# Patient Record
Sex: FEMALE | Race: ASIAN | NOT HISPANIC OR LATINO | Employment: FULL TIME | URBAN - METROPOLITAN AREA
[De-identification: names, ages, dates, MRNs, and addresses within clinical notes are randomized per-mention and may not be internally consistent; named-entity substitution may affect disease eponyms.]

---

## 2017-06-27 ENCOUNTER — TRANSCRIBE ORDERS (OUTPATIENT)
Dept: ADMINISTRATIVE | Facility: HOSPITAL | Age: 37
End: 2017-06-27

## 2017-06-27 ENCOUNTER — HOSPITAL ENCOUNTER (OUTPATIENT)
Dept: RADIOLOGY | Facility: HOSPITAL | Age: 37
Discharge: HOME | End: 2017-06-27

## 2017-06-27 DIAGNOSIS — R76.11 POSITIVE TB TEST: Primary | ICD-10-CM

## 2017-06-27 DIAGNOSIS — R76.11 POSITIVE TB TEST: ICD-10-CM

## 2017-06-27 PROCEDURE — 71010 HB CHEST X-RAY 1 VIEW FRONTAL: CPT

## 2017-11-21 ENCOUNTER — APPOINTMENT (OUTPATIENT)
Dept: LAB | Facility: CLINIC | Age: 37
End: 2017-11-21
Payer: COMMERCIAL

## 2017-11-21 ENCOUNTER — TRANSCRIBE ORDERS (OUTPATIENT)
Dept: LAB | Facility: CLINIC | Age: 37
End: 2017-11-21

## 2017-11-21 DIAGNOSIS — K50.819 CROHN'S DISEASE OF SMALL AND LARGE INTESTINES WITH COMPLICATION (HCC): Primary | ICD-10-CM

## 2017-11-21 DIAGNOSIS — K50.819 CROHN'S DISEASE OF SMALL AND LARGE INTESTINES WITH COMPLICATION (HCC): ICD-10-CM

## 2017-11-21 DIAGNOSIS — K58.9 IRRITABLE BOWEL SYNDROME, UNSPECIFIED TYPE: ICD-10-CM

## 2017-11-21 LAB
ALBUMIN SERPL BCP-MCNC: 3.6 G/DL (ref 3.5–5)
ALP SERPL-CCNC: 68 U/L (ref 46–116)
ALT SERPL W P-5'-P-CCNC: 39 U/L (ref 12–78)
ANION GAP SERPL CALCULATED.3IONS-SCNC: 7 MMOL/L (ref 4–13)
AST SERPL W P-5'-P-CCNC: 17 U/L (ref 5–45)
BASOPHILS # BLD AUTO: 0.01 THOUSANDS/ΜL (ref 0–0.1)
BASOPHILS NFR BLD AUTO: 0 % (ref 0–1)
BILIRUB SERPL-MCNC: 0.5 MG/DL (ref 0.2–1)
BUN SERPL-MCNC: 10 MG/DL (ref 5–25)
CALCIUM SERPL-MCNC: 9 MG/DL (ref 8.3–10.1)
CHLORIDE SERPL-SCNC: 105 MMOL/L (ref 100–108)
CO2 SERPL-SCNC: 28 MMOL/L (ref 21–32)
CREAT SERPL-MCNC: 0.59 MG/DL (ref 0.6–1.3)
CRP SERPL QL: <3 MG/L
EOSINOPHIL # BLD AUTO: 0.11 THOUSAND/ΜL (ref 0–0.61)
EOSINOPHIL NFR BLD AUTO: 3 % (ref 0–6)
ERYTHROCYTE [DISTWIDTH] IN BLOOD BY AUTOMATED COUNT: 11.5 % (ref 11.6–15.1)
ERYTHROCYTE [SEDIMENTATION RATE] IN BLOOD: 20 MM/HOUR (ref 0–20)
FERRITIN SERPL-MCNC: 27 NG/ML (ref 8–388)
GFR SERPL CREATININE-BSD FRML MDRD: 117 ML/MIN/1.73SQ M
GLUCOSE SERPL-MCNC: 89 MG/DL (ref 65–140)
HCT VFR BLD AUTO: 29.3 % (ref 34.8–46.1)
HGB BLD-MCNC: 10 G/DL (ref 11.5–15.4)
IRON SERPL-MCNC: 76 UG/DL (ref 50–170)
LYMPHOCYTES # BLD AUTO: 1.03 THOUSANDS/ΜL (ref 0.6–4.47)
LYMPHOCYTES NFR BLD AUTO: 32 % (ref 14–44)
MCH RBC QN AUTO: 31.8 PG (ref 26.8–34.3)
MCHC RBC AUTO-ENTMCNC: 34.1 G/DL (ref 31.4–37.4)
MCV RBC AUTO: 93 FL (ref 82–98)
MONOCYTES # BLD AUTO: 0.2 THOUSAND/ΜL (ref 0.17–1.22)
MONOCYTES NFR BLD AUTO: 6 % (ref 4–12)
NEUTROPHILS # BLD AUTO: 1.84 THOUSANDS/ΜL (ref 1.85–7.62)
NEUTS SEG NFR BLD AUTO: 59 % (ref 43–75)
PLATELET # BLD AUTO: 223 THOUSANDS/UL (ref 149–390)
PMV BLD AUTO: 9.2 FL (ref 8.9–12.7)
POTASSIUM SERPL-SCNC: 4.2 MMOL/L (ref 3.5–5.3)
PROT SERPL-MCNC: 7.4 G/DL (ref 6.4–8.2)
RBC # BLD AUTO: 3.14 MILLION/UL (ref 3.81–5.12)
SODIUM SERPL-SCNC: 140 MMOL/L (ref 136–145)
TIBC SERPL-MCNC: 310 UG/DL (ref 250–450)
WBC # BLD AUTO: 3.19 THOUSAND/UL (ref 4.31–10.16)

## 2017-11-21 PROCEDURE — 85652 RBC SED RATE AUTOMATED: CPT

## 2017-11-21 PROCEDURE — 85025 COMPLETE CBC W/AUTO DIFF WBC: CPT

## 2017-11-21 PROCEDURE — 82542 COL CHROMOTOGRAPHY QUAL/QUAN: CPT

## 2017-11-21 PROCEDURE — 86140 C-REACTIVE PROTEIN: CPT

## 2017-11-21 PROCEDURE — 83540 ASSAY OF IRON: CPT

## 2017-11-21 PROCEDURE — 80053 COMPREHEN METABOLIC PANEL: CPT

## 2017-11-21 PROCEDURE — 36415 COLL VENOUS BLD VENIPUNCTURE: CPT

## 2017-11-21 PROCEDURE — 83550 IRON BINDING TEST: CPT

## 2017-11-21 PROCEDURE — 82728 ASSAY OF FERRITIN: CPT

## 2017-11-25 ENCOUNTER — OFFICE VISIT (OUTPATIENT)
Dept: URGENT CARE | Facility: MEDICAL CENTER | Age: 37
End: 2017-11-25
Payer: COMMERCIAL

## 2017-11-25 PROCEDURE — 99213 OFFICE O/P EST LOW 20 MIN: CPT

## 2017-11-25 PROCEDURE — S9088 SERVICES PROVIDED IN URGENT: HCPCS

## 2017-11-29 ENCOUNTER — OFFICE VISIT (OUTPATIENT)
Dept: URGENT CARE | Facility: MEDICAL CENTER | Age: 37
End: 2017-11-29
Payer: COMMERCIAL

## 2017-11-29 PROCEDURE — S9088 SERVICES PROVIDED IN URGENT: HCPCS

## 2017-11-29 PROCEDURE — 99203 OFFICE O/P NEW LOW 30 MIN: CPT

## 2017-11-29 NOTE — PROGRESS NOTES
Assessment    1  Otitis externa (380 10) (J72 76)    Plan  Otitis externa    · Ofloxacin 0 3 % Otic Solution; INSTILL 5 DROPS INTO RIGHT EAR TWICE DAILY   · *1 -  [facility] Co-Management  *  Status: Hold For -Scheduling,Retrospective Authorization  Requested for: [id number]  Care Summary provided  : Yes    Discussion/Summary  Discussion Summary:   Use antibiotic drops as directed  Follow with ENT  Medication Side Effects Reviewed: Possible side effects of new medications were reviewed with the patient/guardian today  Understands and agrees with treatment plan: The treatment plan was reviewed with the patient/guardian  The patient/guardian understands and agrees with the treatment plan   Counseling Documentation With Imm: The patient was counseled regarding diagnostic results,-- instructions for management,-- risk factor reductions,-- prognosis,-- patient and family education,-- impressions,-- risks and benefits of treatment options,-- importance of compliance with treatment  Follow Up Instructions: Follow Up with your Primary Care Provider in 1-2 days  If your symptoms worsen, go to the nearest [place] [address] Emergency Department  Chief Complaint    1  Ear Discharge  Chief Complaint Free Text Note Form: Pt has chronic ear drainage that became bloody yesteday and she developed mild discomfort in same      History of Present Illness  Hospital Based Practices Required Assessment:  Pain Assessment  the patient states they have pain  The pain is located in the R ear  (on a scale of 0 to 10, the patient rates the pain at 2 )  Abuse And Domestic Violence Screen   Yes, the patient is safe at home  -- The patient states no one is hurting them  Depression And Suicide Screen  No, the patient has not had thoughts of hurting themself  No, the patient has not felt depressed in the past 7 days  Prefered Language is  english  Primary Language is  luis armando  Readiness To Learn: Receptive    Barriers To Learning: none  Preferred Learning: verbal   Otitis Externa (Brief): The patient is being seen for an initial evaluation of otitis externa  Symptoms:  ear pain,-- ear drainage,-- ear fullness-- and-- ear itching, but-- no ear swelling-- and-- no difficulty hearing  The patient is currently experiencing symptoms  Associated symptoms:  Patient reports history of ear drainage  Patient reports she has been to multiple ENTs in the past , but-- no fever,-- no stuffy nose,-- no sore throat,-- no headache,-- no dizziness,-- no cervical lymphadenopathy,-- no tooth pain,-- no temporomandibular joint pain-- and-- no tinnitus  Review of Systems  Focused-Female:  Constitutional: No fever, no chills, feels well, no tiredness, no recent weight gain or loss  ENT: no ear ache, no loss of hearing, no nosebleeds or nasal discharge, no sore throat or hoarseness-- and-- as noted in HPI  Cardiovascular: no complaints of slow or fast heart rate, no chest pain, no palpitations, no leg claudication or lower extremity edema  Respiratory: no complaints of shortness of breath, no wheezing, no dyspnea on exertion, no orthopnea or PND  Breasts: no complaints of breast pain, breast lump or nipple discharge  Gastrointestinal: no complaints of abdominal pain, no constipation, no nausea or diarrhea, no vomiting, no bloody stools  Genitourinary: no complaints of dysuria, no incontinence, no pelvic pain, no dysmenorrhea, no vaginal discharge or abnormal vaginal bleeding  Musculoskeletal: no complaints of arthralgia, no myalgia, no joint swelling or stiffness, no limb pain or swelling  Integumentary: no complaints of skin rash or lesion, no itching or dry skin, no skin wounds  Neurological: no complaints of headache, no confusion, no numbness or tingling, no dizziness or fainting  Active Problems  1  Crohn's disease (555 9) (K50 90)   2  Ear drainage (388 60) (H92 10)    Past Medical History  1   Patient denies significant medical history  Active Problems And Past Medical History Reviewed: The active problems and past medical history were reviewed and updated today  Family History  Family History Reviewed: The family history was reviewed and updated today  Social History   · Never a smoker  Social History Reviewed: The social history was reviewed and updated today  Surgical History    1  History of  Section  Surgical History Reviewed: The surgical history was reviewed and updated today  Current Meds   1  AzaTHIOprine 50 MG Oral Tablet; Therapy: (Recorded:2017) to Recorded  Medication List Reviewed: The medication list was reviewed and updated today  Allergies    1  No Known Drug Allergies    Vitals  Signs   Recorded: 2017 11:57AM   Temperature: 98 7 F  Heart Rate: 96  Respiration: 18  Systolic: 626  Diastolic: 62  Weight: 769 lb   O2 Saturation: 97  Pain Scale: 2    Physical Exam   Constitutional  General appearance: No acute distress, well appearing and well nourished  Eyes  Conjunctiva and lids: No swelling, erythema or discharge  Pupils and irises: Equal, round and reactive to light  Ears, Nose, Mouth, and Throat  External inspection of ears and nose: Normal    Otoscopic examination: Abnormal   The right external canal was tender,-- was swollen,-- was erythematous-- and-- had a discharge, but-- was normal,-- did not have a foreign body-- and-- did not have exostosis  Pulmonary  Respiratory effort: No increased work of breathing or signs of respiratory distress  Auscultation of lungs: Clear to auscultation  Cardiovascular  Palpation of heart: Normal PMI, no thrills  Auscultation of heart: Normal rate and rhythm, normal S1 and S2, without murmurs     Examination of extremities for edema and/or varicosities: Normal    Psychiatric  Orientation to person, place, and time: Normal    Mood and affect: Normal        Signatures   Electronically signed by : LUANNE Gerber;  2017 12:26PM EST                       (Author)    Electronically signed by : NOA Tobias ; Nov 28 2017  4:42PM EST                       (Co-author)

## 2017-11-30 LAB
6-TGN ENTSUB RBC: 106 PMOL/8X 10E8
6MMP ENTSUB RBC: 253 PMOL/8X 10E8

## 2017-12-07 ENCOUNTER — TRANSCRIBE ORDERS (OUTPATIENT)
Dept: LAB | Facility: CLINIC | Age: 37
End: 2017-12-07

## 2017-12-07 ENCOUNTER — APPOINTMENT (OUTPATIENT)
Dept: LAB | Facility: CLINIC | Age: 37
End: 2017-12-07
Payer: COMMERCIAL

## 2017-12-07 DIAGNOSIS — K58.9 IRRITABLE BOWEL SYNDROME, UNSPECIFIED TYPE: ICD-10-CM

## 2017-12-07 DIAGNOSIS — K50.819 CROHN'S DISEASE OF SMALL AND LARGE INTESTINES WITH COMPLICATION (HCC): Primary | ICD-10-CM

## 2017-12-07 DIAGNOSIS — K50.819 CROHN'S DISEASE OF SMALL AND LARGE INTESTINES WITH COMPLICATION (HCC): ICD-10-CM

## 2017-12-07 PROCEDURE — 83993 ASSAY FOR CALPROTECTIN FECAL: CPT

## 2017-12-13 LAB — CALPROTECTIN STL-MCNT: <16 UG/G (ref 0–120)

## 2017-12-14 ENCOUNTER — ALLSCRIPTS OFFICE VISIT (OUTPATIENT)
Dept: OTHER | Facility: OTHER | Age: 37
End: 2017-12-14

## 2018-01-04 ENCOUNTER — GENERIC CONVERSION - ENCOUNTER (OUTPATIENT)
Dept: OTHER | Facility: OTHER | Age: 38
End: 2018-01-04

## 2018-01-09 NOTE — PROGRESS NOTES
Assessment   1  History of Yeast infection (112 9) (B37 9)    Plan    Fluconazole 150 MG Oral Tablet; TAKE 1 TABLET 1 TIME ONLY; Therapy: [id number] to (Tammy Rabago)  Requested for: [id number]; Last Rx:29Nov2017; Status: ACTIVE Ordered     Rx By: Vivian Mina; Dispense: 1 Days ; #:1 Tablet; Refill: 0;      For: PMH: Yeast infection; JANE = N; Verified Transmission to [address] [place] [address]; Last Updated By: System, [employer]; 12/14/2017 8:59:09 AM      Discussion/Summary   Discussion Summary:    Take fluconazole as directed  follow up with PCP year old URI next fell appointment  Go to the ER for worsening symptoms uncontrolled pain fever chills discharge or other concerning symptoms  Medication Side Effects Reviewed: Possible side effects of new medications were reviewed with the patient/guardian today  Understands and agrees with treatment plan: The treatment plan was reviewed with the patient/guardian  The patient/guardian understands and agrees with the treatment plan    Counseling Documentation With Imm: The patient was counseled regarding diagnostic results,-- instructions for management,-- risk factor reductions,-- prognosis,-- patient and family education,-- impressions,-- risks and benefits of treatment options,-- importance of compliance with treatment  Follow Up Instructions: Follow Up with your Primary Care Provider in 1-2 days  If your symptoms worsen, go to the nearest [place] [address] Emergency Department  Chief Complaint   1  Dysuria  Chief Complaint Free Text Note Form: Pt states she has pain on urination since saturday      History of Present Illness   HPI: This is a 66-year-old female complaining of burning itching and vaginal discharge  Patient reports she is only sexually active with her   Patient reports discharge his cottage cheeselike   Patient reports she has had yeast infections in the past and feels same    Hospital Based Practices Required Assessment: Pain Assessment      the patient states they have pain  The pain is located in the urination  Review of Systems   Focused-Female:      Constitutional: No fever, no chills, feels well, no tiredness, no recent weight gain or loss  ENT: no ear ache, no loss of hearing, no nosebleeds or nasal discharge, no sore throat or hoarseness  Cardiovascular: no complaints of slow or fast heart rate, no chest pain, no palpitations, no leg claudication or lower extremity edema  Respiratory: no complaints of shortness of breath, no wheezing, no dyspnea on exertion, no orthopnea or PND  Breasts: no complaints of breast pain, breast lump or nipple discharge  Gastrointestinal: no complaints of abdominal pain, no constipation, no nausea or diarrhea, no vomiting, no bloody stools  Genitourinary: no complaints of dysuria, no incontinence, no pelvic pain, no dysmenorrhea, no vaginal discharge or abnormal vaginal bleeding-- and-- as noted in HPI  Musculoskeletal: no complaints of arthralgia, no myalgia, no joint swelling or stiffness, no limb pain or swelling  Integumentary: no complaints of skin rash or lesion, no itching or dry skin, no skin wounds  Neurological: no complaints of headache, no confusion, no numbness or tingling, no dizziness or fainting  Active Problems    1  Ear drainage (388 60) (H92 10)      Crohn's disease (555 9) (K50 90)               Past Medical History   1  History of Otitis externa (380 10) (H60 90)   2  Patient denies significant medical history  Active Problems And Past Medical History Reviewed: The active problems and past medical history were reviewed and updated today  Family History   Family History Reviewed: The family history was reviewed and updated today  Social History    · Never a smoker  Social History Reviewed: The social history was reviewed and updated today  Surgical History   1   History of  Section  Surgical History Reviewed: The surgical history was reviewed and updated today  Current Meds    1  AzaTHIOprine 50 MG Oral Tablet; Therapy: (Recorded:25Nov2017) to Recorded   2  Ofloxacin 0 3 % Otic Solution; INSTILL 5 DROPS INTO RIGHT EAR TWICE DAILY; Therapy: [id number] to (Evaluate:64Ler6866); Last Rx:25Nov2017 Ordered  Medication List Reviewed: The medication list was reviewed and updated today  Allergies   1  No Known Drug Allergies    Vitals   Signs   Recorded: 36GER5995 08:24PM   Temperature: 98 6 F  Heart Rate: 100  Respiration: 20  Weight: 114 lb   O2 Saturation: 97    Physical Exam        Constitutional      General appearance: No acute distress, well appearing and well nourished  Pulmonary      Respiratory effort: No increased work of breathing or signs of respiratory distress  Auscultation of lungs: Clear to auscultation  Cardiovascular      Auscultation of heart: Normal rate and rhythm, normal S1 and S2, without murmurs  Lymphatic      Palpation of lymph nodes in neck: No lymphadenopathy         Future Appointments      Date/Time Provider Specialty Site   01/18/2018 08:30 AM Imelda Parker, Alice De La Fuente  Family Medicine [facility]     Signatures    Electronically signed by : Juan David Plasencia [facility]; Jan 8 2018  8:23AM EST                       (Author)     Electronically signed by : NOA Lester ; Jan 8 2018  3:59PM EST                       (Co-author)

## 2018-01-18 ENCOUNTER — GENERIC CONVERSION - ENCOUNTER (OUTPATIENT)
Dept: OTHER | Facility: OTHER | Age: 38
End: 2018-01-18

## 2018-01-18 DIAGNOSIS — Z12.4 ENCOUNTER FOR SCREENING FOR MALIGNANT NEOPLASM OF CERVIX: ICD-10-CM

## 2018-01-18 PROCEDURE — G0145 SCR C/V CYTO,THINLAYER,RESCR: HCPCS | Performed by: NURSE PRACTITIONER

## 2018-01-18 PROCEDURE — 87624 HPV HI-RISK TYP POOLED RSLT: CPT | Performed by: NURSE PRACTITIONER

## 2018-01-19 ENCOUNTER — LAB REQUISITION (OUTPATIENT)
Dept: LAB | Facility: HOSPITAL | Age: 38
End: 2018-01-19
Payer: COMMERCIAL

## 2018-01-19 DIAGNOSIS — Z12.4 ENCOUNTER FOR SCREENING FOR MALIGNANT NEOPLASM OF CERVIX: ICD-10-CM

## 2018-01-23 VITALS
RESPIRATION RATE: 16 BRPM | WEIGHT: 116 LBS | HEART RATE: 98 BPM | DIASTOLIC BLOOD PRESSURE: 70 MMHG | SYSTOLIC BLOOD PRESSURE: 106 MMHG | BODY MASS INDEX: 23.39 KG/M2 | HEIGHT: 59 IN | TEMPERATURE: 98.1 F

## 2018-01-23 LAB — HPV RRNA GENITAL QL NAA+PROBE: NORMAL

## 2018-01-23 NOTE — PROGRESS NOTES
Assessment    1  Encounter for preventive health examination (V70 0) (Z00 00)   2  Crohn's disease (555 9) (K50 90)   3  Hearing loss (389 9) (H91 90)   · right    Plan  Crohn's disease    · 1 - Birgit DINH, Cara Montalvo (Gastroenterology) Co-Management  *  Status: Active  Requested  for: 40LRD2900  Care Summary provided  : Yes   · 2 - Kandice Ellison MD, Mike Roberts  Colorectal Surgery, Gastroenterology Co-Management  *   Status: Hold For - Scheduling  Requested for: 13RJM7986  Care Summary provided  : Yes    Discussion/Summary  health maintenance visit Currently, she eats a healthy diet and has an adequate exercise regimen  the risks and benefits of cervical cancer screening were discussed schedule pap Breast cancer screening: the risks and benefits of breast cancer screening were discussed and breast cancer screening is not indicated  Colorectal cancer screening: has Crohns and will be getting colonoscopy 12/2017  Osteoporosis screening: bone mineral density testing is not indicated  The will get vaccine info, signed release of info  She was advised to be evaluated by ENT and GI  Advice and education were given regarding nutrition, aerobic exercise and weight bearing exercise  Patient discussion: discussed with the patient  HM- get prior records, schedule GYN/Womens health appt  Crohns- follow up with GI in [facility] and find local provider- suggestions given  Hearing loss- follow up ENT  The patient was counseled regarding instructions for management, risk factor reductions, impressions  Chief Complaint  new patient, establish care, physical      History of Present Illness  , Adult Female: The patient is being seen for a health maintenance evaluation  The last health maintenance visit was >1 year(s) ago  General Health: The patient's health since the last visit is described as good  She has regular dental visits  She denies vision problems  She has hearing loss  right side, followed by ENT   Immunizations status:  by report up to date  Lifestyle:  She consumes a diverse and healthy diet  She does not have any weight concerns  She exercises regularly  She does not use tobacco  She consumes alcohol  She denies drug use  Reproductive health:  she reports normal menses  she uses contraception  For contraception, she essure permanent  she is sexually active  pregnancy history: G 2P 2, 2   c section x2  Screening:   HPI: Crohns Dz diagnosed about 1 year ago, on medication, has colonoscopy scheduled for this month  Followed by GI in [place]  Hearing loss- right ear, was evaluated more than a year ago, and now seeing ENT in [place] for hearing loss  Has follow up appointment  Review of Systems    Constitutional: no fever, not feeling poorly and not feeling tired  Eyes: no eye pain, no eyesight problems and no purulent discharge from the eyes  ENT: hearing loss, but as noted in HPI  Cardiovascular: no chest pain, no palpitations and no lower extremity edema  Respiratory: no shortness of breath, no cough, no wheezing and no shortness of breath during exertion  Gastrointestinal: +crohns, but as noted in HPI, no abdominal pain, no nausea, no constipation and no diarrhea  Genitourinary: some heavy periods, last pap 7 years ago, but no pelvic pain  Musculoskeletal: no arthralgias and no myalgias  Integumentary: no rashes  Psychiatric: no anxiety and no depression  Active Problems    1  Crohn's disease (555 9) (K50 90)   2  Dysuria (788 1) (R30 0)   3  Ear drainage (388 60) (H92 10)   4   Hearing loss (389 9) (H91 90)    Past Medical History    · History of Otitis externa (380 10) (H60 90)   · Patient denies significant medical history   · History of Yeast infection (112 9) (B37 9)    Surgical History    · History of  Section    Family History  Mother    · Family history of hypertension (V17 49) (Z82 49)  Father    · Family history of hypertension (V17 49) (Z82 49)    Social History    · Always uses seat belt   · Consumes healthy diet (V49 89)   · Employed   · Exercises regularly   ·    · Never a smoker   · No illicit drug use   · Occasional alcohol use    Current Meds   1  AzaTHIOprine 50 MG Oral Tablet; TAKE 1 on day one  AND 1 and 1/2 TABLETSday two,   alternating; Therapy: (Recorded:79Bht6569) to Recorded   2  Ofloxacin 0 3 % Otic Solution; INSTILL 5 DROPS INTO RIGHT EAR TWICE DAILY; Therapy: 47BFZ2182 to (Evaluate:67Oyl0187); Last Rx:25Nov2017 Ordered    completed ear drops     Allergies    1  No Known Drug Allergies    Vitals   Recorded: 62VHV5921 08:37AM   Temperature 98 1 F   Heart Rate 98   Respiration 16   Systolic 150   Diastolic 70   Height 4 ft 10 7 in   Weight 116 lb    BMI Calculated 23 67   BSA Calculated 1 46   Pain Scale 0     Physical Exam    Constitutional   General appearance: No acute distress, well appearing and well nourished  Head and Face   Head and face: Normal     Palpation of the face and sinuses: No sinus tenderness  Eyes   Conjunctiva and lids: No swelling, erythema or discharge  Pupils and irises: Equal, round, reactive to light  Ears, Nose, Mouth, and Throat   External inspection of ears and nose: Normal     Otoscopic examination: Abnormal   right ear TM distorted, injected  Hearing: Normal     Nasal mucosa, septum, and turbinates: Normal without edema or erythema  Lips, teeth, and gums: Normal, good dentition  Oropharynx: Normal with no erythema, edema, exudate or lesions  Neck   Neck: Supple, symmetric, trachea midline, no masses  Thyroid: Normal, no thyromegaly  Pulmonary   Respiratory effort: No increased work of breathing or signs of respiratory distress  Auscultation of lungs: Clear to auscultation  Cardiovascular   Palpation of heart: Normal PMI, no thrills  Auscultation of heart: Normal rate and rhythm, normal S1 and S2, no murmurs  Peripheral vascular exam: Normal     Abdomen   Abdomen: Non-tender, no masses  Lymphatic   Palpation of lymph nodes in neck: No lymphadenopathy  Musculoskeletal   Gait and station: Normal     Digits and nails: Normal without clubbing or cyanosis  Joints, bones, and muscles: Normal     Range of motion: Normal     Stability: Normal     Muscle strength/tone: Normal     Skin   Skin and subcutaneous tissue: Normal without rashes or lesions  Neurologic   Cortical function: Normal mental status  Reflexes: 2+ and symmetric  Sensation: No sensory loss  Psychiatric   Orientation to person, place, and time: Normal     Recent and remote memory: Intact  Attending Note  Collaborating Physician Note: Collaborating Physician: I discussed the case with the Advanced Practitioner and reviewed the note, I supervised the Advanced Practitioner and I agree with the Advanced Practitioner note        Future Appointments    Date/Time Provider Specialty Site   01/04/2018 01:00 PM Iban Brooks, [address] FAMILY PRACTICE     Signatures   Electronically signed by : Destiny Mariee, [facility]; Dec 14 2017  3:17PM EST                       (Author)    Electronically signed by : JAYLIN Salcedo ; Dec 15 2017  1:00PM EST                       (Author)

## 2018-01-23 NOTE — MISCELLANEOUS
Provider Comments  Provider Comments:   pt no showed today      Signatures   Electronically signed by : Kyle Blackwell, ; Jan 4 2018  1:38PM EST                       (Author)

## 2018-01-24 VITALS
HEIGHT: 59 IN | HEART RATE: 72 BPM | BODY MASS INDEX: 23.08 KG/M2 | SYSTOLIC BLOOD PRESSURE: 100 MMHG | TEMPERATURE: 99.1 F | RESPIRATION RATE: 16 BRPM | DIASTOLIC BLOOD PRESSURE: 60 MMHG | WEIGHT: 114.5 LBS

## 2018-01-24 LAB
LAB AP GYN PRIMARY INTERPRETATION: NORMAL
Lab: NORMAL

## 2018-04-25 ENCOUNTER — OFFICE VISIT (OUTPATIENT)
Dept: GASTROENTEROLOGY | Facility: CLINIC | Age: 38
End: 2018-04-25
Payer: COMMERCIAL

## 2018-04-25 VITALS
TEMPERATURE: 98.5 F | DIASTOLIC BLOOD PRESSURE: 70 MMHG | HEART RATE: 75 BPM | BODY MASS INDEX: 22.98 KG/M2 | HEIGHT: 59 IN | WEIGHT: 114 LBS | SYSTOLIC BLOOD PRESSURE: 102 MMHG

## 2018-04-25 DIAGNOSIS — K50.80 CROHN'S DISEASE OF BOTH SMALL AND LARGE INTESTINE WITHOUT COMPLICATION (HCC): Primary | ICD-10-CM

## 2018-04-25 DIAGNOSIS — K64.8 INTERNAL HEMORRHOIDS WITH COMPLICATION: ICD-10-CM

## 2018-04-25 PROCEDURE — 99244 OFF/OP CNSLTJ NEW/EST MOD 40: CPT | Performed by: INTERNAL MEDICINE

## 2018-04-25 RX ORDER — AZATHIOPRINE 50 MG/1
TABLET ORAL
COMMUNITY
End: 2019-02-18 | Stop reason: SDUPTHER

## 2018-04-25 NOTE — LETTER
April 25, 2018     Merlinda Nail, [address] [place] [address]    Patient: Josh Gusman   YOB: 1980   Date of Visit: 4/25/2018       Dear Dr Pawel Cristina: Thank you for referring Josh Gusman to me for evaluation  Below are my notes for this consultation  If you have questions, please do not hesitate to call me  I look forward to following your patient along with you  Sincerely,        Elvis Mcadams MD        CC: No Recipients  Elvis Mcadams MD  4/25/2018  5:30 PM  Sign at close encounter  Consultation - [facility]  Josh Gusman 40 y o  female MRN: [id number]          Assessment & Plan:  Pleasant 77-year-old female diagnosed with Crohn's disease of her small bowel and colon, clinically in remission on azathioprine alternating doses a 50 and 75 mg, last colonoscopy December 2017 demonstrated complete mucosal healing at outside institution  Patient also has intermittent rectal bleeding secondary to internal and external hemorrhoids  1  Crohn's disease of both small and large intestine without complication   -continue current doses of azathioprine  -we will check laboratory studies as per routine  -discussed patient long-term side effects of Imuran, recommended annual dermatologic examination    2  Rectal bleeding secondary to internal and external hemorrhoids  - I recommend taking Miralax daily for the hemorrhoids  She was instructed to lower the dose if she notices loose stools  - I suggest trying sitzbath daily to help external hemorrhoids   - She will continue using hydrocortisone acetate suppositories as needed for her internal hemorrhoids  _____________________________________________________________    CC: Crohn's disease  HPI:  Josh Gusman is a 40 y  o female who was referred here after she moved to [place] and was too far away from her previous gastroenterologist  She was diagnosed with Crohn's disease in colon and ileum by Dr Wilde Laughter  Her symptoms began in 2015  The symptoms included constipation, and abdominal pain  She having denies diarrhea  She was put on steroids for 2 months after her diagnosis, and she has been taking Imuran since then  She takes fiber supplements for external and internal hemorrhoids  She is otherwise very healthy and denies change in stool caliber, melena, hematochezia, tenesmus, loss of appetite, nausea, weight loss, vomiting, diarrhea, and dysphagia  She reports sometimes experiencing chills  She is a nonsmoker, and drinks socially  She had two kids, and has no family history of Crohn's disease, but the patient's cousin was diagnosed with ulcerative colitis  She denies NSAID use  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[place] [address]  He continues to have intermittent rectal bleeding which has harder stools  She also notes that the higher doses of Imuran she did have leukopenia  Her last colonoscopy December demonstrated complete mucosal healing  ROS:  The remainder of the ROS was negative except for the pertinent positives mentioned in HPI  Allergies: Patient has no known allergies  Medications:   Current Outpatient Prescriptions:     azaTHIOprine (IMURAN) 50 mg tablet, Take by mouth, Disp: , Rfl: '    History reviewed  No pertinent past medical history  Past Surgical History:   Procedure Laterality Date    COLONOSCOPY         Family History   Problem Relation Age of Onset    Colon cancer Neg Hx     Liver disease Neg Hx         reports that she has never smoked  She has never used smokeless tobacco  She reports that she drinks alcohol  She reports that she does not use drugs            Physical Exam:     /70 (BP Location: Left arm, Patient Position: Sitting, Cuff Size: Standard)   Pulse 75   Temp 98 5 °F (36 9 °C)   Ht 4' 11" (1 499 m)   Wt 51 7 kg (114 lb)   BMI 23 03 kg/m²      Gen: wn/wd, NAD  HEENT: anicteric, MMM, no cervical LAD  CVS: RRR, no m/r/g  CHEST: CTA b/l  ABD: +BS, soft, NT,ND, no hepatosplenomegaly  EXT: no c/c/e  NEURO: aaox3  SKIN: NO rashes    Attestation:   By signing my name below, IAlen, attest that this documentation has been prepared under the direction and in the presence of Pat Mccabe MD  Electronically Signed: Pablo Pham  04/25/2018  IPat, personally performed the services described in this documentation  All medical record entries made by the scribe were at my direction and in my presence  I have reviewed the chart and discharge instructions and agree that the record reflects my personal performance and is accurate and complete  Pat Mccabe MD  04/25/2018

## 2018-04-25 NOTE — PROGRESS NOTES
Consultation - [facility]  Josh Gusman 40 y o  female MRN: [id number]          Assessment & Plan:  Pleasant 35-year-old female diagnosed with Crohn's disease of her small bowel and colon, clinically in remission on azathioprine alternating doses a 50 and 75 mg, last colonoscopy December 2017 demonstrated complete mucosal healing at outside institution  Patient also has intermittent rectal bleeding secondary to internal and external hemorrhoids  1  Crohn's disease of both small and large intestine without complication   -continue current doses of azathioprine  -we will check laboratory studies as per routine  -discussed patient long-term side effects of Imuran, recommended annual dermatologic examination    2  Rectal bleeding secondary to internal and external hemorrhoids  - I recommend taking Miralax daily for the hemorrhoids  She was instructed to lower the dose if she notices loose stools  - I suggest trying sitzbath daily to help external hemorrhoids   - She will continue using hydrocortisone acetate suppositories as needed for her internal hemorrhoids  _____________________________________________________________    CC: Crohn's disease  HPI:  Josh Gusman is a 40 y  o female who was referred here after she moved to [place] and was too far away from her previous gastroenterologist  She was diagnosed with Crohn's disease in colon and ileum by Dr Fauzia De Dios  Her symptoms began in 2015  The symptoms included constipation, and abdominal pain  She having denies diarrhea  She was put on steroids for 2 months after her diagnosis, and she has been taking Imuran since then  She takes fiber supplements for external and internal hemorrhoids  She is otherwise very healthy and denies change in stool caliber, melena, hematochezia, tenesmus, loss of appetite, nausea, weight loss, vomiting, diarrhea, and dysphagia  She reports sometimes experiencing chills   She is a nonsmoker, and drinks socially  She had two kids, and has no family history of Crohn's disease, but the patient's cousin was diagnosed with ulcerative colitis  She denies NSAID use  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[place] [address]  He continues to have intermittent rectal bleeding which has harder stools  She also notes that the higher doses of Imuran she did have leukopenia  Her last colonoscopy December demonstrated complete mucosal healing  ROS:  The remainder of the ROS was negative except for the pertinent positives mentioned in HPI  Allergies: Patient has no known allergies  Medications:   Current Outpatient Prescriptions:     azaTHIOprine (IMURAN) 50 mg tablet, Take by mouth, Disp: , Rfl: '    History reviewed  No pertinent past medical history  Past Surgical History:   Procedure Laterality Date    COLONOSCOPY         Family History   Problem Relation Age of Onset    Colon cancer Neg Hx     Liver disease Neg Hx         reports that she has never smoked  She has never used smokeless tobacco  She reports that she drinks alcohol  She reports that she does not use drugs  Physical Exam:     /70 (BP Location: Left arm, Patient Position: Sitting, Cuff Size: Standard)   Pulse 75   Temp 98 5 °F (36 9 °C)   Ht 4' 11" (1 499 m)   Wt 51 7 kg (114 lb)   BMI 23 03 kg/m²     Gen: wn/wd, NAD  HEENT: anicteric, MMM, no cervical LAD  CVS: RRR, no m/r/g  CHEST: CTA b/l  ABD: +BS, soft, NT,ND, no hepatosplenomegaly  EXT: no c/c/e  NEURO: aaox3  SKIN: NO rashes    Attestation:   By signing my name below, INati, attest that this documentation has been prepared under the direction and in the presence of Amairlis Haskins MD  Electronically Signed: Christal Garcia  04/25/2018  IAmarilis, personally performed the services described in this documentation  All medical record entries made by the christal were at my direction and in my presence   I have reviewed the chart and discharge instructions and agree that the record reflects my personal performance and is accurate and complete  Cora Snow MD  04/25/2018

## 2018-04-25 NOTE — PATIENT INSTRUCTIONS
Take miralax 1 capful daily, if you have diarrhea reduce to every other day  Sitz bath with 1/4 cup of epsom salt and warm water

## 2018-04-26 ENCOUNTER — APPOINTMENT (OUTPATIENT)
Dept: LAB | Facility: CLINIC | Age: 38
End: 2018-04-26
Payer: COMMERCIAL

## 2018-04-26 DIAGNOSIS — K50.80 CROHN'S DISEASE OF BOTH SMALL AND LARGE INTESTINE WITHOUT COMPLICATION (HCC): ICD-10-CM

## 2018-04-26 LAB
ALBUMIN SERPL BCP-MCNC: 3.8 G/DL (ref 3.5–5)
ALP SERPL-CCNC: 71 U/L (ref 46–116)
ALT SERPL W P-5'-P-CCNC: 32 U/L (ref 12–78)
ANION GAP SERPL CALCULATED.3IONS-SCNC: 6 MMOL/L (ref 4–13)
AST SERPL W P-5'-P-CCNC: 20 U/L (ref 5–45)
BASOPHILS # BLD AUTO: 0.01 THOUSANDS/ΜL (ref 0–0.1)
BASOPHILS NFR BLD AUTO: 0 % (ref 0–1)
BILIRUB DIRECT SERPL-MCNC: 0.14 MG/DL (ref 0–0.2)
BILIRUB SERPL-MCNC: 0.5 MG/DL (ref 0.2–1)
BUN SERPL-MCNC: 9 MG/DL (ref 5–25)
CALCIUM SERPL-MCNC: 8.7 MG/DL (ref 8.3–10.1)
CHLORIDE SERPL-SCNC: 104 MMOL/L (ref 100–108)
CO2 SERPL-SCNC: 28 MMOL/L (ref 21–32)
CREAT SERPL-MCNC: 0.53 MG/DL (ref 0.6–1.3)
CRP SERPL QL: <3 MG/L
EOSINOPHIL # BLD AUTO: 0.07 THOUSAND/ΜL (ref 0–0.61)
EOSINOPHIL NFR BLD AUTO: 2 % (ref 0–6)
ERYTHROCYTE [DISTWIDTH] IN BLOOD BY AUTOMATED COUNT: 11.5 % (ref 11.6–15.1)
ERYTHROCYTE [SEDIMENTATION RATE] IN BLOOD: 13 MM/HOUR (ref 0–20)
GFR SERPL CREATININE-BSD FRML MDRD: 122 ML/MIN/1.73SQ M
GLUCOSE SERPL-MCNC: 91 MG/DL (ref 65–140)
HCT VFR BLD AUTO: 30.2 % (ref 34.8–46.1)
HGB BLD-MCNC: 10.3 G/DL (ref 11.5–15.4)
LIPASE SERPL-CCNC: 228 U/L (ref 73–393)
LYMPHOCYTES # BLD AUTO: 1.38 THOUSANDS/ΜL (ref 0.6–4.47)
LYMPHOCYTES NFR BLD AUTO: 36 % (ref 14–44)
MCH RBC QN AUTO: 31.7 PG (ref 26.8–34.3)
MCHC RBC AUTO-ENTMCNC: 34.1 G/DL (ref 31.4–37.4)
MCV RBC AUTO: 93 FL (ref 82–98)
MONOCYTES # BLD AUTO: 0.26 THOUSAND/ΜL (ref 0.17–1.22)
MONOCYTES NFR BLD AUTO: 7 % (ref 4–12)
NEUTROPHILS # BLD AUTO: 2.08 THOUSANDS/ΜL (ref 1.85–7.62)
NEUTS SEG NFR BLD AUTO: 55 % (ref 43–75)
PLATELET # BLD AUTO: 274 THOUSANDS/UL (ref 149–390)
PMV BLD AUTO: 8.8 FL (ref 8.9–12.7)
POTASSIUM SERPL-SCNC: 4 MMOL/L (ref 3.5–5.3)
PROT SERPL-MCNC: 7.4 G/DL (ref 6.4–8.2)
RBC # BLD AUTO: 3.25 MILLION/UL (ref 3.81–5.12)
SODIUM SERPL-SCNC: 138 MMOL/L (ref 136–145)
WBC # BLD AUTO: 3.8 THOUSAND/UL (ref 4.31–10.16)

## 2018-04-26 PROCEDURE — 85025 COMPLETE CBC W/AUTO DIFF WBC: CPT

## 2018-04-26 PROCEDURE — 85652 RBC SED RATE AUTOMATED: CPT

## 2018-04-26 PROCEDURE — 86140 C-REACTIVE PROTEIN: CPT

## 2018-04-26 PROCEDURE — 36415 COLL VENOUS BLD VENIPUNCTURE: CPT

## 2018-04-26 PROCEDURE — 83690 ASSAY OF LIPASE: CPT

## 2018-04-26 PROCEDURE — 80076 HEPATIC FUNCTION PANEL: CPT

## 2018-04-26 PROCEDURE — 80048 BASIC METABOLIC PNL TOTAL CA: CPT

## 2018-05-07 ENCOUNTER — TELEPHONE (OUTPATIENT)
Dept: GASTROENTEROLOGY | Facility: CLINIC | Age: 38
End: 2018-05-07

## 2018-05-07 NOTE — TELEPHONE ENCOUNTER
Dr Qamar Florian Julio Cesar  Patient is going to start antibiotic for Right Ear:     Cefdinir 300mg bid for 7 days  Ciprodex 4-5 drops bid

## 2018-06-28 ENCOUNTER — TRANSCRIBE ORDERS (OUTPATIENT)
Dept: LAB | Facility: CLINIC | Age: 38
End: 2018-06-28

## 2018-06-28 ENCOUNTER — APPOINTMENT (OUTPATIENT)
Dept: LAB | Facility: CLINIC | Age: 38
End: 2018-06-28

## 2018-06-28 DIAGNOSIS — Z00.8 HEALTH EXAMINATION IN POPULATION SURVEY: ICD-10-CM

## 2018-06-28 DIAGNOSIS — Z00.8 HEALTH EXAMINATION IN POPULATION SURVEY: Primary | ICD-10-CM

## 2018-06-28 LAB
CHOLEST SERPL-MCNC: 165 MG/DL (ref 50–200)
EST. AVERAGE GLUCOSE BLD GHB EST-MCNC: 105 MG/DL
HBA1C MFR BLD: 5.3 % (ref 4.2–6.3)
HDLC SERPL-MCNC: 73 MG/DL (ref 40–60)
LDLC SERPL CALC-MCNC: 83 MG/DL (ref 0–100)
NONHDLC SERPL-MCNC: 92 MG/DL
TRIGL SERPL-MCNC: 43 MG/DL

## 2018-06-28 PROCEDURE — 80061 LIPID PANEL: CPT

## 2018-06-28 PROCEDURE — 36415 COLL VENOUS BLD VENIPUNCTURE: CPT

## 2018-06-28 PROCEDURE — 83036 HEMOGLOBIN GLYCOSYLATED A1C: CPT

## 2018-08-10 ENCOUNTER — OFFICE VISIT (OUTPATIENT)
Dept: FAMILY MEDICINE CLINIC | Facility: CLINIC | Age: 38
End: 2018-08-10
Payer: COMMERCIAL

## 2018-08-10 VITALS
SYSTOLIC BLOOD PRESSURE: 112 MMHG | HEIGHT: 59 IN | TEMPERATURE: 98.1 F | HEART RATE: 78 BPM | WEIGHT: 117 LBS | DIASTOLIC BLOOD PRESSURE: 70 MMHG | BODY MASS INDEX: 23.59 KG/M2 | RESPIRATION RATE: 18 BRPM

## 2018-08-10 DIAGNOSIS — J02.9 SORETHROAT: ICD-10-CM

## 2018-08-10 DIAGNOSIS — J06.9 VIRAL URI WITH COUGH: Primary | ICD-10-CM

## 2018-08-10 PROBLEM — H91.91 HEARING LOSS OF RIGHT EAR: Status: ACTIVE | Noted: 2018-08-10

## 2018-08-10 PROCEDURE — 99213 OFFICE O/P EST LOW 20 MIN: CPT | Performed by: FAMILY MEDICINE

## 2018-08-10 PROCEDURE — 3008F BODY MASS INDEX DOCD: CPT | Performed by: FAMILY MEDICINE

## 2018-08-10 NOTE — PROGRESS NOTES
Ozzie Peña 1980 female MRN: [id number]    Acute Visit        ASSESSMENT/PLAN  Diagnoses and all orders for this visit:    Viral URI with cough:  1  Advised supportive care at this time  2  Drink 6-8 glasses of water to maintain hydration  3  Take tylenol every 6 hours for fever and body aches  4  Frequent hand washing to prevent spread of infection  5  Honey with hot water or tea for cough  6  Return to office and ER precautions discussed  Sorethroat:   Center criteria for strep throat score 1, symptoms likely due to viral URI  Advised supportive care, prescribed  viscous suspension for symptomatic relief  -     al mag oxide-diphenhydramine-lidocaine viscous (MAGIC MOUTHWASH) 1:1:1 suspension; Swish and swallow 10 mL every 4 (four) hours as needed for mouth pain or discomfort         Follow-up if symptoms does not resolve or gets worse in 7-10 days  SUBJECTIVE  CC: Sore Throat; Cough; and Celsa Daniel is a 45 y o  female who presented for an acute visit complaining of Sorethroat, cough and chills  She works in hospital as   Sore Throat    This is a new problem  Episode onset: 3 days  The problem has been unchanged  The maximum temperature recorded prior to her arrival was 100 4 - 100 9 F  The pain is at a severity of 4/10  Associated symptoms include coughing and a hoarse voice  Pertinent negatives include no abdominal pain, congestion, diarrhea, ear discharge, ear pain, headaches, neck pain, shortness of breath, trouble swallowing or vomiting  She has had no exposure to strep or mono  She has tried acetaminophen and gargles for the symptoms  The treatment provided mild relief  Cough   This is a new problem  The current episode started yesterday  Cough characteristics: mucus  Associated symptoms include chills, a fever, postnasal drip and a sore throat   Pertinent negatives include no chest pain, ear pain, headaches, nasal congestion, rhinorrhea, shortness of breath or wheezing  Nothing aggravates the symptoms  Treatments tried: nyquil  The treatment provided no relief  Her past medical history is significant for environmental allergies  There is no history of asthma or COPD  Review of Systems   Constitutional: Positive for chills and fever  Negative for activity change, appetite change and fatigue  HENT: Positive for hoarse voice, postnasal drip, sneezing and sore throat  Negative for congestion, ear discharge, ear pain, rhinorrhea and trouble swallowing  Respiratory: Positive for cough  Negative for shortness of breath and wheezing  Cardiovascular: Negative for chest pain, palpitations and leg swelling  Gastrointestinal: Negative for abdominal pain, constipation, diarrhea and vomiting  Musculoskeletal: Negative for neck pain  Allergic/Immunologic: Positive for environmental allergies  Neurological: Negative for weakness and headaches  Historical Information   The patient history was reviewed as follows:  No past medical history on file  Past Surgical History:   Procedure Laterality Date    COLONOSCOPY       Family History   Problem Relation Age of Onset    Colon cancer Neg Hx     Liver disease Neg Hx       Social History   History   Alcohol Use    Yes     History   Drug Use No     History   Smoking Status    Never Smoker   Smokeless Tobacco    Never Used       Medications:   Meds/Allergies   Current Outpatient Prescriptions   Medication Sig Dispense Refill    azaTHIOprine (IMURAN) 50 mg tablet Take by mouth      al mag oxide-diphenhydramine-lidocaine viscous (MAGIC MOUTHWASH) 1:1:1 suspension Swish and swallow 10 mL every 4 (four) hours as needed for mouth pain or discomfort 180 mL 0     No current facility-administered medications for this visit          No Known Allergies    OBJECTIVE  Vitals:   Vitals:    08/10/18 1007   BP: 112/70   Pulse: 78   Resp: 18   Temp: 98 1 °F (36 7 °C)   Weight: 53 1 kg (117 lb)   Height: 4' 11" (1 499 m)       Invasive Devices          No matching active lines, drains, or airways          Physical Exam   Constitutional: She is oriented to person, place, and time  She appears well-developed and well-nourished  HENT:   Head: Normocephalic  Right Ear: A middle ear effusion is present  Decreased hearing is noted  Nose: Nose normal    Mouth/Throat: Mucous membranes are normal  Posterior oropharyngeal erythema present  No oropharyngeal exudate, posterior oropharyngeal edema or tonsillar abscesses  Eyes: Conjunctivae are normal    Neck: Neck supple  Cardiovascular: Normal rate, regular rhythm, normal heart sounds and intact distal pulses  Pulmonary/Chest: Effort normal and breath sounds normal    Abdominal: Soft  Bowel sounds are normal    Lymphadenopathy:     She has no cervical adenopathy  Neurological: She is alert and oriented to person, place, and time  Skin: Skin is warm  Psychiatric: She has a normal mood and affect   Her behavior is normal             _____________________________________________________________________     Mulu Pelaez MD, PGY-2  [facility]   8/10/2018

## 2018-08-10 NOTE — LETTER
August 10, 2018     Patient: Sue Freedman   YOB: 1980   Date of Visit: 8/10/2018       To Whom it May Concern:    Sue Freedman is under my professional care  She was seen in my office on 8/10/2018  She may return to school on 8/13/2018  If you have any questions or concerns, please don't hesitate to call           Sincerely,          Zoltan De Jesus MD

## 2018-09-10 ENCOUNTER — TRANSCRIBE ORDERS (OUTPATIENT)
Dept: ADMINISTRATIVE | Facility: HOSPITAL | Age: 38
End: 2018-09-10

## 2018-09-10 DIAGNOSIS — H91.09 OTOTOXIC HEARING LOSS, UNSPECIFIED LATERALITY: Primary | ICD-10-CM

## 2018-10-02 ENCOUNTER — OFFICE VISIT (OUTPATIENT)
Dept: FAMILY MEDICINE CLINIC | Facility: CLINIC | Age: 38
End: 2018-10-02
Payer: COMMERCIAL

## 2018-10-02 VITALS
TEMPERATURE: 99.6 F | DIASTOLIC BLOOD PRESSURE: 68 MMHG | SYSTOLIC BLOOD PRESSURE: 104 MMHG | BODY MASS INDEX: 23.79 KG/M2 | HEART RATE: 88 BPM | RESPIRATION RATE: 16 BRPM | WEIGHT: 118 LBS | HEIGHT: 59 IN

## 2018-10-02 DIAGNOSIS — J06.9 VIRAL UPPER RESPIRATORY TRACT INFECTION: Primary | ICD-10-CM

## 2018-10-02 PROCEDURE — 99213 OFFICE O/P EST LOW 20 MIN: CPT | Performed by: FAMILY MEDICINE

## 2018-10-02 RX ORDER — GUAIFENESIN 600 MG
1200 TABLET, EXTENDED RELEASE 12 HR ORAL EVERY 12 HOURS SCHEDULED
Qty: 15 TABLET | Refills: 0 | Status: SHIPPED | OUTPATIENT
Start: 2018-10-02 | End: 2020-01-28

## 2018-10-02 NOTE — PROGRESS NOTES
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  +Chills  Tylenol helped  Denies muscle aches, nausea, vomiting, diarrhea  Denies fever at home  Starting cough with production  Review of Systems   Constitutional: Positive for chills and diaphoresis  Negative for activity change and fever  HENT: Positive for congestion, ear pain, postnasal drip, rhinorrhea, sinus pressure and sore throat  Negative for nosebleeds  Eyes: Negative for pain and visual disturbance  Respiratory: Positive for cough  Negative for shortness of breath and wheezing  Cardiovascular: Negative for chest pain and palpitations  Gastrointestinal: Negative for abdominal pain, blood in stool, constipation, diarrhea, nausea and vomiting  Genitourinary: Negative for dysuria  Musculoskeletal: Negative for arthralgias and myalgias  Skin: Negative for rash  Neurological: Negative for dizziness, weakness and headaches  All other systems reviewed and are negative  Historical Information   The patient history was reviewed as follows:  No past medical history on file    Past Surgical History:   Procedure Laterality Date    COLONOSCOPY       Family History   Problem Relation Age of Onset    Colon cancer Neg Hx     Liver disease Neg Hx       Social History   History   Alcohol Use    Yes     History   Drug Use No     History   Smoking Status    Never Smoker   Smokeless Tobacco    Never Used       Medications:   Meds/Allergies   Current Outpatient Prescriptions   Medication Sig Dispense Refill    al mag oxide-diphenhydramine-lidocaine viscous (MAGIC MOUTHWASH) 1:1:1 suspension Swish and swallow 10 mL every 4 (four) hours as needed for mouth pain or discomfort 180 mL 0    azaTHIOprine (IMURAN) 50 mg tablet Take by mouth      guaiFENesin (MUCINEX) 600 mg 12 hr tablet Take 2 tablets (1,200 mg total) by mouth every 12 (twelve) hours 15 tablet 0    lidocaine viscous (XYLOCAINE) 2 % mucosal solution Swish and spit 15 mL 4 (four) times a day as needed for mild pain 100 mL 0     No current facility-administered medications for this visit  No Known Allergies    OBJECTIVE    Vitals:   Vitals:    10/02/18 1014   Weight: 53 5 kg (118 lb)       Physical Exam   Constitutional: She appears well-developed and well-nourished  Non-toxic appearance  She does not have a sickly appearance  She appears ill  HENT:   Head: Normocephalic and atraumatic  Right Ear: Tympanic membrane is scarred  Tympanic membrane is not erythematous and not bulging  Left Ear: Tympanic membrane is scarred  Tympanic membrane is not erythematous and not bulging  Nose: Mucosal edema and rhinorrhea present  No sinus tenderness  Mouth/Throat: Mucous membranes are normal  Normal dentition  No dental caries  Posterior oropharyngeal erythema present  No oropharyngeal exudate, posterior oropharyngeal edema or tonsillar abscesses  Eyes: Conjunctivae and EOM are normal    Cardiovascular: Normal rate, regular rhythm, normal heart sounds and intact distal pulses  No murmur heard  Pulmonary/Chest: Effort normal and breath sounds normal  No respiratory distress  She has no wheezes  She has no rales  Abdominal: Soft  Bowel sounds are normal  She exhibits no distension  There is no tenderness  Lymphadenopathy:     She has no cervical adenopathy  Neurological: She is alert  Skin: Skin is warm and dry  Nursing note and vitals reviewed  Lab:  I have personally reviewed all pertinent results  Imaging:  I have personally reviewed all pertinent results  EKG, Pathology, and Other Studies:   I have personally reviewed all pertinent results      Assessment/Plan   Viral upper respiratory tract infection  Supportive care  Decongestants no more than 3 days  Honey for sore throat/cough  Fluids  ED precautions  RTC if not improving    Jocy was seen today for cold like symptoms  Diagnoses and all orders for this visit:    Viral upper respiratory tract infection  -     guaiFENesin (MUCINEX) 600 mg 12 hr tablet; Take 2 tablets (1,200 mg total) by mouth every 12 (twelve) hours  -     lidocaine viscous (XYLOCAINE) 2 % mucosal solution; Swish and spit 15 mL 4 (four) times a day as needed for mild pain    - PCP: BRIAN Delaney  - Follow-up appointments: [facility]    Future Appointments  Date Time Provider Marcel Blanca   10/29/2018 9:30 AM Jackeline Rubio PA-C [facility]      _____________________________________________________________________   The attending physician, Dr Isaac Cruz, agreed with the plan  Raimundo Montano MD, PGY-3  [facility]   10/2/2018        Please be aware that this note contains text that was dictated and there may be errors pertaining to "sound-alike "words during the dictation process

## 2018-10-02 NOTE — PATIENT INSTRUCTIONS
Cold Symptoms   AMBULATORY CARE:   Cold symptoms  include sneezing, dry throat, a stuffy nose, headache, watery eyes, and a cough  Your cough may be dry, or you may cough up mucus  You may also have muscle aches, joint pain, and tiredness  Rarely, you may have a fever  Cold symptoms occur from inflammation in your upper respiratory system caused by a virus  Most colds go away without treatment  Seek care immediately if:   · You have increased tiredness and weakness  · You are unable to eat  · Your heart is beating much faster than usual for you  · You see white spots in the back of your throat and your neck is swollen and sore to the touch  · You see pinpoint or larger reddish-purple dots on your skin  Contact your healthcare provider if:   · You have a fever higher than 102°F (38 9°C)  · You have new or worsening shortness of breath  · You have thick nasal drainage for more than 2 days  · Your symptoms do not improve or get worse within 5 days  · You have questions or concerns about your condition or care  Treatment for cold symptoms  may include NSAIDS to decrease muscle aches and fever  Cold medicines may also be given to decrease coughing, nasal stuffiness, sneezing, and a runny nose  Manage your cold symptoms: The following may help relieve cold symptoms, such as a dry throat and congestion:  · Gargle with mouthwash or warm salt water as directed  · Suck on throat lozenges or hard candy  · Use a cold or warm vaporizer or humidifier to ease your breathing  · Rest for at least 2 days and then as needed to decrease tiredness and weakness  · Use petroleum based jelly around your nostrils to decrease irritation from blowing your nose  · Drink plenty of liquids  Liquids will help thin and loosen thick mucus so you can cough it up  Liquids will also keep you hydrated   Ask your healthcare provider which liquids are best for you and how much to drink each day   Prevent the spread of germs  by washing your hands often  You can spread your cold germs to others for at least 3 days after your symptoms start  Do not share items, such as eating utensils  Cover your nose and mouth when you cough or sneeze using the crook of your elbow instead of your hands  Throw used tissues in the garbage  Do not smoke:  Smoking may worsen your symptoms and increase the length of time you feel sick  Talk with your healthcare provider if you need help to stop smoking  Follow up with your healthcare provider as directed:  Write down your questions so you remember to ask them during your visits  © 2017 [facility] Information is for End User's use only and may not be sold, redistributed or otherwise used for commercial purposes  All illustrations and images included in CareNotes® are the copyrighted property of [employer]  or [employer]min [employer]ent[employer]  The above information is an  only  It is not intended as medical advice for individual conditions or treatments  Talk to your doctor, nurse or pharmacist before following any medical regimen to see if it is safe and effective for you

## 2018-10-02 NOTE — ASSESSMENT & PLAN NOTE
Supportive care  Decongestants no more than 3 days  Honey for sore throat/cough  Fluids  ED precautions  RTC if not improving

## 2018-10-02 NOTE — LETTER
October 2, 2018     Patient: Haile Garay   YOB: 1980   Date of Visit: 10/2/2018       To Whom it May Concern:    Haile Garay is under my professional care  She was seen in my office on 10/2/2018  She may return to work on 10/4/18  If you have any questions or concerns, please don't hesitate to call           Sincerely,          Emma Cotto MD        CC: No Recipients

## 2018-10-29 ENCOUNTER — OFFICE VISIT (OUTPATIENT)
Dept: GASTROENTEROLOGY | Facility: AMBULARY SURGERY CENTER | Age: 38
End: 2018-10-29
Payer: COMMERCIAL

## 2018-10-29 VITALS
SYSTOLIC BLOOD PRESSURE: 117 MMHG | RESPIRATION RATE: 18 BRPM | WEIGHT: 115.4 LBS | DIASTOLIC BLOOD PRESSURE: 70 MMHG | HEIGHT: 59 IN | BODY MASS INDEX: 23.26 KG/M2 | TEMPERATURE: 98.8 F | HEART RATE: 72 BPM

## 2018-10-29 DIAGNOSIS — K92.1 BLACK STOOL: ICD-10-CM

## 2018-10-29 DIAGNOSIS — K50.90 CROHN'S DISEASE IN REMISSION (HCC): Primary | ICD-10-CM

## 2018-10-29 DIAGNOSIS — K64.8 HEMORRHOIDS, INTERNAL, WITH BLEEDING: ICD-10-CM

## 2018-10-29 PROCEDURE — 99214 OFFICE O/P EST MOD 30 MIN: CPT | Performed by: PHYSICIAN ASSISTANT

## 2018-10-29 RX ORDER — HYDROCORTISONE ACETATE 25 MG/1
25 SUPPOSITORY RECTAL DAILY
Qty: 30 SUPPOSITORY | Refills: 5 | Status: SHIPPED | OUTPATIENT
Start: 2018-10-29 | End: 2020-01-28

## 2018-10-29 RX ORDER — AZATHIOPRINE 50 MG/1
50 TABLET ORAL DAILY
Qty: 30 TABLET | Refills: 5 | Status: SHIPPED | OUTPATIENT
Start: 2018-10-29 | End: 2019-02-17 | Stop reason: SDUPTHER

## 2018-10-29 RX ORDER — AZATHIOPRINE 50 MG/1
TABLET ORAL DAILY
COMMUNITY
End: 2018-10-29 | Stop reason: SDUPTHER

## 2018-10-29 NOTE — PROGRESS NOTES
Assessment and Plan    #1  Crohn's disease, in clinical remission:  Sed rate and CRP normal back in April  Had a colonoscopy last year with signs of clinical remission  On azathioprine daily   -continue azathioprine daily  -we will check a sed rate, CRP, CBC, CMP, and lipase For routine labs  -follow-up with Dr Iram Vee in about 4 to 6 months  At that time patient would like to discuss about possibly weaning off the azathioprine issue will be about 2 years without symptoms  #2   Bright red blood in the stool secondary to history of internal hemorrhoids:  Patient reports she has been having some mild bright red with her stools as well as pain  She typically uses suppositories  and cream as needed  -refill her suppositories and cream   -good hydration  -avoid constipation  Use stool softeners and laxatives as needed  #3   Black stool:  Patient reports 2 episodes of semi soft black stool today  She does report taking MiraLax yesterday and having [place]  No other signs of anemia such as shortness of breath, chest pain, dizziness, lightheadedness  No NSAID use  No abdominal pain  -we will check a hemoglobin with her CBC  -I advised the patient to continue to monitor stools  If she continues to have frequent black stools, or develops any of the above symptoms, I advised her to call us immediately and go to the emergency room  --------------------------------------------------------------------------------------------------------------------    Chief Complaint:   Follow-up for Crohn's disease and internal hemorrhoids    HPI: Paloma Zavaleta is a 45 y o  female who presents today for follow up for Crohn's disease and internal hemorrhoids  Patient is an currently in clinical remission with her Crohn's  She denies any significant GI symptoms such as abdominal pain, diarrhea, nausea, or vomiting  She is on azathioprine daily    She had her last colonoscopy in 2017 which was completely normal     She does have a history of internal hemorrhoids that bleed occasionally and has some pain with this  She takes suppositories and cream as needed for this  Patient reports that with her work schedule, she tends to get constipated over the weekend  She reports taking a dose of MiraLax yesterday  This morning she reports that she had 2 semi soft black stools  She denies tarry stools  She denies any abdominal pain, NSAID use, significant alcohol abuse, history of peptic ulcer disease, nausea, or vomiting  She denies any signs of anemia such as chest pain, shortness of breath, dizziness, lightheadedness, fatigue  She did have some kale yesterday  She denies any iron supplements, Pepto-Bismol, or licorice  Patient denies any dysphagia, unexpected weight loss        Review of Systems:   General: negative for fatigue, fever, night sweats or unexpected weight loss  Psychological: negative for anxiety or depression  Ophthalmic: negative for blurry vision or scleral icterus  ENT: negative for headaches, oral lesions, sore throat, vocal changes or dysphagia  Hematological and Lymphatic: negative for pallor or swollen lymph nodes  Respiratory: negative for cough, shortness of breath or wheezing  Cardiovascular: negative for chest pain, edema or murmur  Gastrointestinal: as mentioned in HPI  Genito-Urinary: negative for dysuria or incontinence  Musculoskeletal: negative for joint pain, joint stiffness or joint swelling  Dermatological: negative for pruritus, rash, or jaundice    Current Medications  Current Outpatient Prescriptions   Medication Sig Dispense Refill    azaTHIOprine (IMURAN) 50 mg tablet Take by mouth      azaTHIOprine (IMURAN) 50 mg tablet Take 1 tablet (50 mg total) by mouth daily 30 tablet 5    Glycerin, Laxative, (ADULT SUPPOSITORY RE) Insert into the rectum      al mag oxide-diphenhydramine-lidocaine viscous (MAGIC MOUTHWASH) 1:1:1 suspension Swish and swallow 10 mL every 4 (four) hours as needed for mouth pain or discomfort (Patient not taking: Reported on 10/29/2018 ) 180 mL 0    guaiFENesin (MUCINEX) 600 mg 12 hr tablet Take 2 tablets (1,200 mg total) by mouth every 12 (twelve) hours (Patient not taking: Reported on 10/29/2018 ) 15 tablet 0    hydrocortisone (ANUSOL-HC) 25 mg suppository Insert 1 suppository (25 mg total) into the rectum daily 30 suppository 5    hydrocortisone-pramoxine (ANALPRAM-HC) 2 5-1 % rectal cream Insert into the rectum daily 30 g 5    lidocaine viscous (XYLOCAINE) 2 % mucosal solution Swish and spit 15 mL 4 (four) times a day as needed for mild pain (Patient not taking: Reported on 10/29/2018 ) 100 mL 0     No current facility-administered medications for this visit  Past Medical History  No past medical history on file      Past Surgical History  Past Surgical History:   Procedure Laterality Date    COLONOSCOPY         Past Social History   Social History     Social History    Marital status: /Civil Union     Spouse name: N/A    Number of children: N/A    Years of education: N/A     Social History Main Topics    Smoking status: Never Smoker    Smokeless tobacco: Never Used    Alcohol use Yes    Drug use: No    Sexual activity: Not Asked     Other Topics Concern    None     Social History Narrative    None       The following portions of the patient's history were reviewed and updated as appropriate: allergies, current medications, past family history, past medical history, past social history, past surgical history and problem list     Vital Signs  Vitals:    10/29/18 0956   BP: 117/70   BP Location: Left arm   Patient Position: Sitting   Cuff Size: Standard   Pulse: 72   Resp: 18   Temp: 98 8 °F (37 1 °C)   TempSrc: Tympanic   Weight: 52 3 kg (115 lb 6 4 oz)   Height: 4' 11" (1 499 m)       Physical Exam:  General appearance: alert, cooperative, no distress  HEENT: normocephalic, anicteric, no eye erythema or discharge, no oropharyngeal thrush  Neck: supple, trachea midline, no adenopathy  Lungs: CTA b/l, no rales, rhonchi, or wheezing, unlabored respirations  Heart: RRR, no murmur, rubs, or gallops  Abdomen: soft, non-tender, non-distended, normal bowel sounds, no masses or organomegaly  Rectal: deferred  Extremities: no cyanosis, clubbing, or edema  Musculoskeletal: normal gait  Skin: color and texture normal, no jaundice, no rashes or lesions  Psychiatric: alert and oriented, normal affect and behavior

## 2018-10-29 NOTE — LETTER
October 29, 2018     [facility] Bound, [address] [place], [place] [id number]    Patient: Layne Bailey   YOB: 1980   Date of Visit: 10/29/2018       Dear Dr Radha Alford: Thank you for referring Layne Bailey to me for evaluation  Below are my notes for this consultation  If you have questions, please do not hesitate to call me  I look forward to following your patient along with you  Sincerely,        Moncho Morales PA-C        CC: No Recipients  Moncho Morales PA-C  10/29/2018 10:29 AM  Sign at close encounter  Assessment and Plan    #1  Crohn's disease, in clinical remission:  Sed rate and CRP normal back in April  Had a colonoscopy last year with signs of clinical remission  On azathioprine daily   -continue azathioprine daily  -we will check a sed rate, CRP, CBC, CMP, and lipase For routine labs  -follow-up with Dr Fabiola Candelaria in about 4 to 6 months  At that time patient would like to discuss about possibly weaning off the azathioprine issue will be about 2 years without symptoms  #2   Bright red blood in the stool secondary to history of internal hemorrhoids:  Patient reports she has been having some mild bright red with her stools as well as pain  She typically uses suppositories  and cream as needed  -refill her suppositories and cream   -good hydration  -avoid constipation  Use stool softeners and laxatives as needed  #3   Black stool:  Patient reports 2 episodes of semi soft black stool today  She does report taking MiraLax yesterday and having [place]  No other signs of anemia such as shortness of breath, chest pain, dizziness, lightheadedness  No NSAID use  No abdominal pain  -we will check a hemoglobin with her CBC  -I advised the patient to continue to monitor stools    If she continues to have frequent black stools, or develops any of the above symptoms, I advised her to call us immediately and go to the emergency room   --------------------------------------------------------------------------------------------------------------------    Chief Complaint:   Follow-up for Crohn's disease and internal hemorrhoids    HPI: Minda Woods is a 45 y o  female who presents today for follow up for Crohn's disease and internal hemorrhoids  Patient is an currently in clinical remission with her Crohn's  She denies any significant GI symptoms such as abdominal pain, diarrhea, nausea, or vomiting  She is on azathioprine daily  She had her last colonoscopy in 2017 which was completely normal     She does have a history of internal hemorrhoids that bleed occasionally and has some pain with this  She takes suppositories and cream as needed for this  Patient reports that with her work schedule, she tends to get constipated over the weekend  She reports taking a dose of MiraLax yesterday  This morning she reports that she had 2 semi soft black stools  She denies tarry stools  She denies any abdominal pain, NSAID use, significant alcohol abuse, history of peptic ulcer disease, nausea, or vomiting  She denies any signs of anemia such as chest pain, shortness of breath, dizziness, lightheadedness, fatigue  She did have some kale yesterday  She denies any iron supplements, Pepto-Bismol, or licorice  Patient denies any dysphagia, unexpected weight loss        Review of Systems:   General: negative for fatigue, fever, night sweats or unexpected weight loss  Psychological: negative for anxiety or depression  Ophthalmic: negative for blurry vision or scleral icterus  ENT: negative for headaches, oral lesions, sore throat, vocal changes or dysphagia  Hematological and Lymphatic: negative for pallor or swollen lymph nodes  Respiratory: negative for cough, shortness of breath or wheezing  Cardiovascular: negative for chest pain, edema or murmur  Gastrointestinal: as mentioned in HPI  Genito-Urinary: negative for dysuria or incontinence  Musculoskeletal: negative for joint pain, joint stiffness or joint swelling  Dermatological: negative for pruritus, rash, or jaundice    Current Medications  Current Outpatient Prescriptions   Medication Sig Dispense Refill    azaTHIOprine (IMURAN) 50 mg tablet Take by mouth      azaTHIOprine (IMURAN) 50 mg tablet Take 1 tablet (50 mg total) by mouth daily 30 tablet 5    Glycerin, Laxative, (ADULT SUPPOSITORY RE) Insert into the rectum      al mag oxide-diphenhydramine-lidocaine viscous (MAGIC MOUTHWASH) 1:1:1 suspension Swish and swallow 10 mL every 4 (four) hours as needed for mouth pain or discomfort (Patient not taking: Reported on 10/29/2018 ) 180 mL 0    guaiFENesin (MUCINEX) 600 mg 12 hr tablet Take 2 tablets (1,200 mg total) by mouth every 12 (twelve) hours (Patient not taking: Reported on 10/29/2018 ) 15 tablet 0    hydrocortisone (ANUSOL-HC) 25 mg suppository Insert 1 suppository (25 mg total) into the rectum daily 30 suppository 5    hydrocortisone-pramoxine (ANALPRAM-HC) 2 5-1 % rectal cream Insert into the rectum daily 30 g 5    lidocaine viscous (XYLOCAINE) 2 % mucosal solution Swish and spit 15 mL 4 (four) times a day as needed for mild pain (Patient not taking: Reported on 10/29/2018 ) 100 mL 0     No current facility-administered medications for this visit  Past Medical History  No past medical history on file      Past Surgical History  Past Surgical History:   Procedure Laterality Date    COLONOSCOPY         Past Social History   Social History     Social History    Marital status: /Civil Union     Spouse name: N/A    Number of children: N/A    Years of education: N/A     Social History Main Topics    Smoking status: Never Smoker    Smokeless tobacco: Never Used    Alcohol use Yes    Drug use: No    Sexual activity: Not Asked     Other Topics Concern    None     Social History Narrative    None       The following portions of the patient's history were reviewed and updated as appropriate: allergies, current medications, past family history, past medical history, past social history, past surgical history and problem list     Vital Signs  Vitals:    10/29/18 0956   BP: 117/70   BP Location: Left arm   Patient Position: Sitting   Cuff Size: Standard   Pulse: 72   Resp: 18   Temp: 98 8 °F (37 1 °C)   TempSrc: Tympanic   Weight: 52 3 kg (115 lb 6 4 oz)   Height: 4' 11" (1 499 m)       Physical Exam:  General appearance: alert, cooperative, no distress  HEENT: normocephalic, anicteric, no eye erythema or discharge, no oropharyngeal thrush  Neck: supple, trachea midline, no adenopathy  Lungs: CTA b/l, no rales, rhonchi, or wheezing, unlabored respirations  Heart: RRR, no murmur, rubs, or gallops  Abdomen: soft, non-tender, non-distended, normal bowel sounds, no masses or organomegaly  Rectal: deferred  Extremities: no cyanosis, clubbing, or edema  Musculoskeletal: normal gait  Skin: color and texture normal, no jaundice, no rashes or lesions  Psychiatric: alert and oriented, normal affect and behavior

## 2018-10-30 ENCOUNTER — APPOINTMENT (OUTPATIENT)
Dept: LAB | Facility: CLINIC | Age: 38
End: 2018-10-30
Payer: COMMERCIAL

## 2018-10-30 DIAGNOSIS — K50.90 CROHN'S DISEASE IN REMISSION (HCC): ICD-10-CM

## 2018-10-30 LAB
ALBUMIN SERPL BCP-MCNC: 3.8 G/DL (ref 3.5–5)
ALP SERPL-CCNC: 68 U/L (ref 46–116)
ALT SERPL W P-5'-P-CCNC: 35 U/L (ref 12–78)
ANION GAP SERPL CALCULATED.3IONS-SCNC: 8 MMOL/L (ref 4–13)
AST SERPL W P-5'-P-CCNC: 21 U/L (ref 5–45)
BILIRUB SERPL-MCNC: 0.6 MG/DL (ref 0.2–1)
BUN SERPL-MCNC: 11 MG/DL (ref 5–25)
CALCIUM SERPL-MCNC: 8.8 MG/DL (ref 8.3–10.1)
CHLORIDE SERPL-SCNC: 104 MMOL/L (ref 100–108)
CO2 SERPL-SCNC: 26 MMOL/L (ref 21–32)
CREAT SERPL-MCNC: 0.54 MG/DL (ref 0.6–1.3)
CRP SERPL QL: <3 MG/L
ERYTHROCYTE [DISTWIDTH] IN BLOOD BY AUTOMATED COUNT: 11.7 % (ref 11.6–15.1)
ERYTHROCYTE [SEDIMENTATION RATE] IN BLOOD: 20 MM/HOUR (ref 0–20)
GFR SERPL CREATININE-BSD FRML MDRD: 120 ML/MIN/1.73SQ M
GLUCOSE P FAST SERPL-MCNC: 99 MG/DL (ref 65–99)
HCT VFR BLD AUTO: 31.4 % (ref 34.8–46.1)
HGB BLD-MCNC: 10.8 G/DL (ref 11.5–15.4)
LIPASE SERPL-CCNC: 222 U/L (ref 73–393)
MCH RBC QN AUTO: 32.3 PG (ref 26.8–34.3)
MCHC RBC AUTO-ENTMCNC: 34.4 G/DL (ref 31.4–37.4)
MCV RBC AUTO: 94 FL (ref 82–98)
PLATELET # BLD AUTO: 250 THOUSANDS/UL (ref 149–390)
PMV BLD AUTO: 10.1 FL (ref 8.9–12.7)
POTASSIUM SERPL-SCNC: 4 MMOL/L (ref 3.5–5.3)
PROT SERPL-MCNC: 7.5 G/DL (ref 6.4–8.2)
RBC # BLD AUTO: 3.34 MILLION/UL (ref 3.81–5.12)
SODIUM SERPL-SCNC: 138 MMOL/L (ref 136–145)
WBC # BLD AUTO: 3.76 THOUSAND/UL (ref 4.31–10.16)

## 2018-10-30 PROCEDURE — 85027 COMPLETE CBC AUTOMATED: CPT

## 2018-10-30 PROCEDURE — 80053 COMPREHEN METABOLIC PANEL: CPT

## 2018-10-30 PROCEDURE — 86140 C-REACTIVE PROTEIN: CPT

## 2018-10-30 PROCEDURE — 83690 ASSAY OF LIPASE: CPT

## 2018-10-30 PROCEDURE — 85652 RBC SED RATE AUTOMATED: CPT

## 2018-10-30 PROCEDURE — 36415 COLL VENOUS BLD VENIPUNCTURE: CPT

## 2018-10-31 ENCOUNTER — TELEPHONE (OUTPATIENT)
Dept: GASTROENTEROLOGY | Facility: AMBULARY SURGERY CENTER | Age: 38
End: 2018-10-31

## 2018-10-31 NOTE — LETTER
October 31, 2018    [address]  [facility] [id number]      Dear Ms Danielle: We have attempted to reach you regarding your results with no response  We ask that you contact our office upon receipt of this letter to receive your results  Thank you in advance for your cooperation and assistance          Sincerely,             Meng Courser  [facility]

## 2018-10-31 NOTE — TELEPHONE ENCOUNTER
----- Message from Danya Nuno PA-C sent at 10/30/2018 10:19 AM EDT -----  Please inform patient of her labs look good  Her sed rate, CRP inflammatory markers are normal   Her lipase is normal   Please inform her that her hemoglobin has actually increased since her last lab draw 6 months ago  All of her electrolytes, kidney function, and liver function tests are completely normal   Please inform patient to let us know if she continues to have black stools or has any signs of anemia

## 2018-11-05 ENCOUNTER — IMMUNIZATION (OUTPATIENT)
Dept: FAMILY MEDICINE CLINIC | Facility: CLINIC | Age: 38
End: 2018-11-05
Payer: COMMERCIAL

## 2018-11-05 DIAGNOSIS — Z23 ENCOUNTER FOR IMMUNIZATION: ICD-10-CM

## 2018-11-05 PROCEDURE — 90471 IMMUNIZATION ADMIN: CPT

## 2018-11-05 PROCEDURE — 90686 IIV4 VACC NO PRSV 0.5 ML IM: CPT

## 2019-02-17 DIAGNOSIS — K50.90 CROHN'S DISEASE IN REMISSION (HCC): ICD-10-CM

## 2019-02-18 RX ORDER — AZATHIOPRINE 50 MG/1
50 TABLET ORAL DAILY
Qty: 30 TABLET | Refills: 0 | OUTPATIENT
Start: 2019-02-18

## 2019-02-18 RX ORDER — AZATHIOPRINE 50 MG/1
50 TABLET ORAL DAILY
Qty: 30 TABLET | Refills: 1 | Status: SHIPPED | OUTPATIENT
Start: 2019-02-18 | End: 2019-10-21 | Stop reason: SDUPTHER

## 2019-02-20 ENCOUNTER — TELEPHONE (OUTPATIENT)
Dept: GASTROENTEROLOGY | Facility: AMBULARY SURGERY CENTER | Age: 39
End: 2019-02-20

## 2019-03-05 ENCOUNTER — OFFICE VISIT (OUTPATIENT)
Dept: GASTROENTEROLOGY | Facility: CLINIC | Age: 39
End: 2019-03-05
Payer: COMMERCIAL

## 2019-03-05 ENCOUNTER — TELEPHONE (OUTPATIENT)
Dept: GASTROENTEROLOGY | Facility: CLINIC | Age: 39
End: 2019-03-05

## 2019-03-05 VITALS
DIASTOLIC BLOOD PRESSURE: 72 MMHG | TEMPERATURE: 98.4 F | BODY MASS INDEX: 24.92 KG/M2 | RESPIRATION RATE: 18 BRPM | HEART RATE: 71 BPM | WEIGHT: 123.6 LBS | HEIGHT: 59 IN | SYSTOLIC BLOOD PRESSURE: 112 MMHG

## 2019-03-05 DIAGNOSIS — K50.80 CROHN'S DISEASE OF BOTH SMALL AND LARGE INTESTINE WITHOUT COMPLICATION (HCC): Primary | ICD-10-CM

## 2019-03-05 PROCEDURE — 99213 OFFICE O/P EST LOW 20 MIN: CPT | Performed by: PHYSICIAN ASSISTANT

## 2019-03-05 NOTE — PROGRESS NOTES
Assessment and Plan    #1  Crohn's disease:  Patient appears to be in remission since 2017  Upon review of her previous colonoscopy reports, she had no signs of active colitis on her colonoscopy at the end of 2017  She did however have some active colitis in 2016  She reports that she has been taking azathioprine 50 mg intermittently  She denies taking on a daily basis but will take it approximately 4 times a week  Denies any significant GI symptoms  She does report she had some left upper quadrant pain after drinking vodka the other day but this subsided  She denies any diarrhea or constipation  She denies any blood in her stool  Her last labs in November 2018 were relatively normal   -patient is interested in stopping azathioprine completely  I discussed the patient's case with Dr Cassi Collier  He will be calling the patient  She may need repeat colonoscopy  -at this time I advised the patient to continue taking the azathioprine  -in the meantime we will order a CBC, hepatic function panel, BMP, lipase, sed rate, and CRP check her labs as these have not been done since October in she is still taking the medication intermittently     --------------------------------------------------------------------------------------------------------------------    Chief Complaint:  Follow-up Crohn's disease    HPI: Minda Woods is a 45 y o  female who presents today for follow up for Crohn's disease  Patient appears to be in remission since 2017  Upon review of her previous colonoscopy reports, she had no signs of active colitis on her colonoscopy at the end of 2017  She did however have some active colitis in 2016  She reports that she has been taking azathioprine 50 mg intermittently  She denies taking on a daily basis but will take it approximately 4 times a week  Denies any significant GI symptoms  She does report she had some left upper quadrant pain after drinking vodka the other day but this subsided    She denies any diarrhea or constipation  She denies any blood in her stool  Her last labs in November 2018 were relatively normal     Patient denies any nausea, vomiting, dysphagia, unexpected weight loss, diarrhea, constipation, blood in stool, or black tarry stools  Patient's last colonoscopy was December 2017      She reports gaining about 5 lb since December    Review of Systems:   General: negative for fatigue, fever, night sweats or unexpected weight loss; +weight gain  Psychological: negative for anxiety or depression  Ophthalmic: negative for blurry vision or scleral icterus  ENT: negative for headaches, oral lesions, sore throat, vocal changes or dysphagia  Hematological and Lymphatic: negative for pallor or swollen lymph nodes  Respiratory: negative for cough, shortness of breath or wheezing  Cardiovascular: negative for chest pain, edema or murmur  Gastrointestinal: as mentioned in HPI  Genito-Urinary: negative for dysuria or incontinence  Musculoskeletal: negative for joint pain, joint stiffness or joint swelling  Dermatological: negative for pruritus, rash, or jaundice    Current Medications  Current Outpatient Medications   Medication Sig Dispense Refill    azaTHIOprine (IMURAN) 50 mg tablet Take 1 tablet (50 mg total) by mouth daily 30 tablet 1    hydrocortisone (ANUSOL-HC) 25 mg suppository Insert 1 suppository (25 mg total) into the rectum daily 30 suppository 5    hydrocortisone-pramoxine (ANALPRAM-HC) 2 5-1 % rectal cream Insert into the rectum daily 30 g 5    al mag oxide-diphenhydramine-lidocaine viscous (MAGIC MOUTHWASH) 1:1:1 suspension Swish and swallow 10 mL every 4 (four) hours as needed for mouth pain or discomfort (Patient not taking: Reported on 10/29/2018 ) 180 mL 0    Glycerin, Laxative, (ADULT SUPPOSITORY RE) Insert into the rectum      guaiFENesin (MUCINEX) 600 mg 12 hr tablet Take 2 tablets (1,200 mg total) by mouth every 12 (twelve) hours (Patient not taking: Reported on 10/29/2018 ) 15 tablet 0    lidocaine viscous (XYLOCAINE) 2 % mucosal solution Swish and spit 15 mL 4 (four) times a day as needed for mild pain (Patient not taking: Reported on 10/29/2018 ) 100 mL 0     No current facility-administered medications for this visit  Past Medical History  No past medical history on file  Past Surgical History  Past Surgical History:   Procedure Laterality Date    COLONOSCOPY         Past Social History   Social History     Socioeconomic History    Marital status: /Civil Union     Spouse name: None    Number of children: None    Years of education: None    Highest education level: None   Occupational History    None   Social Needs    Financial resource strain: None    Food insecurity:     Worry: None     Inability: None    Transportation needs:     Medical: None     Non-medical: None   Tobacco Use    Smoking status: Never Smoker    Smokeless tobacco: Never Used   Substance and Sexual Activity    Alcohol use:  Yes    Drug use: No    Sexual activity: None   Lifestyle    Physical activity:     Days per week: None     Minutes per session: None    Stress: None   Relationships    Social connections:     Talks on phone: None     Gets together: None     Attends [employer] service: None     Active member of club or organization: None     Attends meetings of clubs or organizations: None     Relationship status: None    Intimate partner violence:     Fear of current or ex partner: None     Emotionally abused: None     Physically abused: None     Forced sexual activity: None   Other Topics Concern    None   Social History Narrative    None       The following portions of the patient's history were reviewed and updated as appropriate: allergies, current medications, past family history, past medical history, past social history, past surgical history and problem list     Vital Signs  Vitals:    03/05/19 1107   BP: 112/72   BP Location: Left arm   Patient Position: Sitting   Cuff Size: Standard   Pulse: 71   Resp: 18   Temp: 98 4 °F (36 9 °C)   TempSrc: Tympanic   Weight: 56 1 kg (123 lb 9 6 oz)   Height: 4' 11" (1 499 m)       Physical Exam:  General appearance: alert, cooperative, no distress  HEENT: normocephalic, anicteric, no eye erythema or discharge, no oropharyngeal thrush  Neck: supple, trachea midline, no adenopathy  Lungs: CTA b/l, no rales, rhonchi, or wheezing, unlabored respirations  Heart: RRR, no murmur, rubs, or gallops  Abdomen: soft, non-tender, non-distended, normal bowel sounds, no masses or organomegaly  Rectal: deferred  Extremities: no cyanosis, clubbing, or edema  Musculoskeletal: normal gait  Skin: color and texture normal, no jaundice, no rashes or lesions  Psychiatric: alert and oriented, normal affect and behavior

## 2019-03-06 ENCOUNTER — TELEPHONE (OUTPATIENT)
Dept: GASTROENTEROLOGY | Facility: CLINIC | Age: 39
End: 2019-03-06

## 2019-03-06 NOTE — TELEPHONE ENCOUNTER
Spoke to patient  I tried to discuss her recent office visit with her  She has been trying to wean off of azathioprine though clinically she is in remission, her last colonoscopy at outside provider also suggested remission  I discussed with her that if she stops the medication she may have recurrence or flare-up of symptoms  She is not on any other medications for maintenance at this time    I discussed with her that which check her laboratory studies which she has been ordered for, once these have been resulted will try to review them and discussed all treatment options  May benefit to have a repeat colonoscopy or imaging studies or at least per her on medications such as Pentasa budesonide to make sure that she does not have recurrent flare-up of Crohn's disease

## 2019-03-08 ENCOUNTER — APPOINTMENT (OUTPATIENT)
Dept: LAB | Facility: CLINIC | Age: 39
End: 2019-03-08
Payer: COMMERCIAL

## 2019-03-08 DIAGNOSIS — K50.80 CROHN'S DISEASE OF BOTH SMALL AND LARGE INTESTINE WITHOUT COMPLICATION (HCC): ICD-10-CM

## 2019-03-08 LAB
ALBUMIN SERPL BCP-MCNC: 3.9 G/DL (ref 3.5–5)
ALP SERPL-CCNC: 70 U/L (ref 46–116)
ALT SERPL W P-5'-P-CCNC: 32 U/L (ref 12–78)
ANION GAP SERPL CALCULATED.3IONS-SCNC: 11 MMOL/L (ref 4–13)
AST SERPL W P-5'-P-CCNC: 18 U/L (ref 5–45)
BASOPHILS # BLD AUTO: 0.01 THOUSANDS/ΜL (ref 0–0.1)
BASOPHILS NFR BLD AUTO: 0 % (ref 0–1)
BILIRUB DIRECT SERPL-MCNC: 0.18 MG/DL (ref 0–0.2)
BILIRUB SERPL-MCNC: 1.1 MG/DL (ref 0.2–1)
BUN SERPL-MCNC: 9 MG/DL (ref 5–25)
CALCIUM SERPL-MCNC: 9.3 MG/DL (ref 8.3–10.1)
CHLORIDE SERPL-SCNC: 102 MMOL/L (ref 100–108)
CO2 SERPL-SCNC: 26 MMOL/L (ref 21–32)
CREAT SERPL-MCNC: 0.67 MG/DL (ref 0.6–1.3)
CRP SERPL QL: 4.9 MG/L
EOSINOPHIL # BLD AUTO: 0.06 THOUSAND/ΜL (ref 0–0.61)
EOSINOPHIL NFR BLD AUTO: 1 % (ref 0–6)
ERYTHROCYTE [DISTWIDTH] IN BLOOD BY AUTOMATED COUNT: 11.6 % (ref 11.6–15.1)
ERYTHROCYTE [SEDIMENTATION RATE] IN BLOOD: 25 MM/HOUR (ref 0–20)
GFR SERPL CREATININE-BSD FRML MDRD: 112 ML/MIN/1.73SQ M
GLUCOSE SERPL-MCNC: 91 MG/DL (ref 65–140)
HCT VFR BLD AUTO: 33.8 % (ref 34.8–46.1)
HGB BLD-MCNC: 11.5 G/DL (ref 11.5–15.4)
IMM GRANULOCYTES # BLD AUTO: 0.01 THOUSAND/UL (ref 0–0.2)
IMM GRANULOCYTES NFR BLD AUTO: 0 % (ref 0–2)
LIPASE SERPL-CCNC: 226 U/L (ref 73–393)
LYMPHOCYTES # BLD AUTO: 0.74 THOUSANDS/ΜL (ref 0.6–4.47)
LYMPHOCYTES NFR BLD AUTO: 15 % (ref 14–44)
MCH RBC QN AUTO: 31.4 PG (ref 26.8–34.3)
MCHC RBC AUTO-ENTMCNC: 34 G/DL (ref 31.4–37.4)
MCV RBC AUTO: 92 FL (ref 82–98)
MONOCYTES # BLD AUTO: 0.34 THOUSAND/ΜL (ref 0.17–1.22)
MONOCYTES NFR BLD AUTO: 7 % (ref 4–12)
NEUTROPHILS # BLD AUTO: 3.87 THOUSANDS/ΜL (ref 1.85–7.62)
NEUTS SEG NFR BLD AUTO: 77 % (ref 43–75)
NRBC BLD AUTO-RTO: 0 /100 WBCS
PLATELET # BLD AUTO: 228 THOUSANDS/UL (ref 149–390)
PMV BLD AUTO: 9.4 FL (ref 8.9–12.7)
POTASSIUM SERPL-SCNC: 4 MMOL/L (ref 3.5–5.3)
PROT SERPL-MCNC: 8 G/DL (ref 6.4–8.2)
RBC # BLD AUTO: 3.66 MILLION/UL (ref 3.81–5.12)
SODIUM SERPL-SCNC: 139 MMOL/L (ref 136–145)
WBC # BLD AUTO: 5.03 THOUSAND/UL (ref 4.31–10.16)

## 2019-03-08 PROCEDURE — 85652 RBC SED RATE AUTOMATED: CPT

## 2019-03-08 PROCEDURE — 85025 COMPLETE CBC W/AUTO DIFF WBC: CPT

## 2019-03-08 PROCEDURE — 36415 COLL VENOUS BLD VENIPUNCTURE: CPT

## 2019-03-08 PROCEDURE — 86140 C-REACTIVE PROTEIN: CPT

## 2019-03-08 PROCEDURE — 80048 BASIC METABOLIC PNL TOTAL CA: CPT

## 2019-03-08 PROCEDURE — 80076 HEPATIC FUNCTION PANEL: CPT

## 2019-03-08 PROCEDURE — 83690 ASSAY OF LIPASE: CPT

## 2019-03-19 ENCOUNTER — TELEPHONE (OUTPATIENT)
Dept: GASTROENTEROLOGY | Facility: CLINIC | Age: 39
End: 2019-03-19

## 2019-03-20 DIAGNOSIS — K50.80 CROHN'S DISEASE OF BOTH SMALL AND LARGE INTESTINE WITHOUT COMPLICATION (HCC): Primary | ICD-10-CM

## 2019-03-20 NOTE — TELEPHONE ENCOUNTER
I called patient and gave results that were in the chart  Patient would like to know if she needs a follow up and what the plan is now

## 2019-03-20 NOTE — TELEPHONE ENCOUNTER
I forwarded the elevated inflammatory markers to Dr Ángel Evans to review to decide if he wanted to perform a colonoscopy or change her meds   He will need to review and make further recommendations

## 2019-03-20 NOTE — TELEPHONE ENCOUNTER
Please call patient and schedule with dr Edwin Collado in 3 months in either office    Labs were mailed to the patient

## 2019-03-20 NOTE — TELEPHONE ENCOUNTER
Spoke to patient and informed her that we were waiting on dr Saran Perdomo to review and we would call her back

## 2019-03-20 NOTE — TELEPHONE ENCOUNTER
Please can you book 24th June at 10 20 am with Dr Rodrigo Tang at [facility]    Patient aware and agreeable :)

## 2019-03-20 NOTE — TELEPHONE ENCOUNTER
I spoke to patient  She is having no symptoms at this time  I discussed with her that elevated inflammatory markers may not be reflective of her Crohn's disease  She has more compliant with her Imuran at this time  She did not want to proceed with colonoscopy as of yet  I do not think a CT scan which shows any significant changes given that she has no symptoms  I have ordered routine laboratory studies, please have her scheduled office visit with me in 3 months at either office  Make sure she has a blood work done about 2 weeks before the office visit

## 2019-04-03 PROBLEM — H90.A31 MIXED CONDUCTIVE AND SENSORINEURAL HEARING LOSS OF RIGHT EAR WITH RESTRICTED HEARING OF LEFT EAR: Status: ACTIVE | Noted: 2019-04-03

## 2019-04-03 PROBLEM — Z86.69 HISTORY OF ACUTE OTITIS EXTERNA: Status: ACTIVE | Noted: 2019-04-03

## 2019-05-29 ENCOUNTER — APPOINTMENT (OUTPATIENT)
Dept: LAB | Facility: CLINIC | Age: 39
End: 2019-05-29
Payer: COMMERCIAL

## 2019-05-29 DIAGNOSIS — K50.80 CROHN'S DISEASE OF BOTH SMALL AND LARGE INTESTINE WITHOUT COMPLICATION (HCC): ICD-10-CM

## 2019-05-29 LAB
ALBUMIN SERPL BCP-MCNC: 3.6 G/DL (ref 3.5–5)
ALP SERPL-CCNC: 68 U/L (ref 46–116)
ALT SERPL W P-5'-P-CCNC: 28 U/L (ref 12–78)
ANION GAP SERPL CALCULATED.3IONS-SCNC: 7 MMOL/L (ref 4–13)
AST SERPL W P-5'-P-CCNC: 20 U/L (ref 5–45)
BASOPHILS # BLD AUTO: 0.01 THOUSANDS/ΜL (ref 0–0.1)
BASOPHILS NFR BLD AUTO: 0 % (ref 0–1)
BILIRUB DIRECT SERPL-MCNC: 0.14 MG/DL (ref 0–0.2)
BILIRUB SERPL-MCNC: 0.9 MG/DL (ref 0.2–1)
BUN SERPL-MCNC: 10 MG/DL (ref 5–25)
CALCIUM SERPL-MCNC: 9.1 MG/DL (ref 8.3–10.1)
CHLORIDE SERPL-SCNC: 106 MMOL/L (ref 100–108)
CO2 SERPL-SCNC: 27 MMOL/L (ref 21–32)
CREAT SERPL-MCNC: 0.57 MG/DL (ref 0.6–1.3)
CRP SERPL QL: <3 MG/L
EOSINOPHIL # BLD AUTO: 0.09 THOUSAND/ΜL (ref 0–0.61)
EOSINOPHIL NFR BLD AUTO: 2 % (ref 0–6)
ERYTHROCYTE [DISTWIDTH] IN BLOOD BY AUTOMATED COUNT: 11.8 % (ref 11.6–15.1)
ERYTHROCYTE [SEDIMENTATION RATE] IN BLOOD: 15 MM/HOUR (ref 0–20)
GFR SERPL CREATININE-BSD FRML MDRD: 118 ML/MIN/1.73SQ M
GLUCOSE SERPL-MCNC: 94 MG/DL (ref 65–140)
HCT VFR BLD AUTO: 34 % (ref 34.8–46.1)
HGB BLD-MCNC: 11.4 G/DL (ref 11.5–15.4)
IMM GRANULOCYTES # BLD AUTO: 0.01 THOUSAND/UL (ref 0–0.2)
IMM GRANULOCYTES NFR BLD AUTO: 0 % (ref 0–2)
LIPASE SERPL-CCNC: 203 U/L (ref 73–393)
LYMPHOCYTES # BLD AUTO: 1.04 THOUSANDS/ΜL (ref 0.6–4.47)
LYMPHOCYTES NFR BLD AUTO: 24 % (ref 14–44)
MCH RBC QN AUTO: 31.3 PG (ref 26.8–34.3)
MCHC RBC AUTO-ENTMCNC: 33.5 G/DL (ref 31.4–37.4)
MCV RBC AUTO: 93 FL (ref 82–98)
MONOCYTES # BLD AUTO: 0.29 THOUSAND/ΜL (ref 0.17–1.22)
MONOCYTES NFR BLD AUTO: 7 % (ref 4–12)
NEUTROPHILS # BLD AUTO: 2.85 THOUSANDS/ΜL (ref 1.85–7.62)
NEUTS SEG NFR BLD AUTO: 67 % (ref 43–75)
NRBC BLD AUTO-RTO: 0 /100 WBCS
PLATELET # BLD AUTO: 221 THOUSANDS/UL (ref 149–390)
PMV BLD AUTO: 11.5 FL (ref 8.9–12.7)
POTASSIUM SERPL-SCNC: 4.6 MMOL/L (ref 3.5–5.3)
PROT SERPL-MCNC: 7.3 G/DL (ref 6.4–8.2)
RBC # BLD AUTO: 3.64 MILLION/UL (ref 3.81–5.12)
SODIUM SERPL-SCNC: 140 MMOL/L (ref 136–145)
WBC # BLD AUTO: 4.29 THOUSAND/UL (ref 4.31–10.16)

## 2019-05-29 PROCEDURE — 85652 RBC SED RATE AUTOMATED: CPT

## 2019-05-29 PROCEDURE — 80048 BASIC METABOLIC PNL TOTAL CA: CPT

## 2019-05-29 PROCEDURE — 85025 COMPLETE CBC W/AUTO DIFF WBC: CPT

## 2019-05-29 PROCEDURE — 83690 ASSAY OF LIPASE: CPT

## 2019-05-29 PROCEDURE — 36415 COLL VENOUS BLD VENIPUNCTURE: CPT

## 2019-05-29 PROCEDURE — 80076 HEPATIC FUNCTION PANEL: CPT

## 2019-05-29 PROCEDURE — 86140 C-REACTIVE PROTEIN: CPT

## 2019-06-10 ENCOUNTER — TELEPHONE (OUTPATIENT)
Dept: GASTROENTEROLOGY | Facility: AMBULARY SURGERY CENTER | Age: 39
End: 2019-06-10

## 2019-06-24 ENCOUNTER — OFFICE VISIT (OUTPATIENT)
Dept: GASTROENTEROLOGY | Facility: AMBULARY SURGERY CENTER | Age: 39
End: 2019-06-24
Payer: COMMERCIAL

## 2019-06-24 VITALS
HEART RATE: 90 BPM | HEIGHT: 59 IN | SYSTOLIC BLOOD PRESSURE: 102 MMHG | BODY MASS INDEX: 23.99 KG/M2 | DIASTOLIC BLOOD PRESSURE: 60 MMHG | WEIGHT: 119 LBS | TEMPERATURE: 98.1 F | RESPIRATION RATE: 14 BRPM

## 2019-06-24 DIAGNOSIS — K50.80 CROHN'S DISEASE OF BOTH SMALL AND LARGE INTESTINE WITHOUT COMPLICATION (HCC): Primary | ICD-10-CM

## 2019-06-24 PROCEDURE — 99213 OFFICE O/P EST LOW 20 MIN: CPT | Performed by: INTERNAL MEDICINE

## 2019-10-21 ENCOUNTER — TELEPHONE (OUTPATIENT)
Dept: GASTROENTEROLOGY | Facility: AMBULARY SURGERY CENTER | Age: 39
End: 2019-10-21

## 2019-10-21 DIAGNOSIS — K50.90 CROHN'S DISEASE IN REMISSION (HCC): ICD-10-CM

## 2019-10-21 RX ORDER — AZATHIOPRINE 50 MG/1
50 TABLET ORAL DAILY
Qty: 30 TABLET | Refills: 2 | Status: SHIPPED | OUTPATIENT
Start: 2019-10-21 | End: 2019-10-31 | Stop reason: SDUPTHER

## 2019-10-21 NOTE — TELEPHONE ENCOUNTER
Refill Request for Medication: muran    Dose: 50 mg    Quantity: 30 tabs    Pharmacy: [facility]

## 2019-10-28 ENCOUNTER — TRANSCRIBE ORDERS (OUTPATIENT)
Dept: LAB | Facility: CLINIC | Age: 39
End: 2019-10-28

## 2019-10-28 ENCOUNTER — APPOINTMENT (OUTPATIENT)
Dept: LAB | Facility: CLINIC | Age: 39
End: 2019-10-28
Payer: COMMERCIAL

## 2019-10-28 DIAGNOSIS — K50.80 CROHN'S DISEASE OF BOTH SMALL AND LARGE INTESTINE WITHOUT COMPLICATION (HCC): ICD-10-CM

## 2019-10-28 LAB
ALBUMIN SERPL BCP-MCNC: 3.7 G/DL (ref 3.5–5)
ALP SERPL-CCNC: 81 U/L (ref 46–116)
ALT SERPL W P-5'-P-CCNC: 28 U/L (ref 12–78)
ANION GAP SERPL CALCULATED.3IONS-SCNC: 8 MMOL/L (ref 4–13)
AST SERPL W P-5'-P-CCNC: 18 U/L (ref 5–45)
BASOPHILS # BLD AUTO: 0.02 THOUSANDS/ΜL (ref 0–0.1)
BASOPHILS NFR BLD AUTO: 0 % (ref 0–1)
BILIRUB DIRECT SERPL-MCNC: 0.16 MG/DL (ref 0–0.2)
BILIRUB SERPL-MCNC: 0.6 MG/DL (ref 0.2–1)
BUN SERPL-MCNC: 8 MG/DL (ref 5–25)
CALCIUM SERPL-MCNC: 8.9 MG/DL (ref 8.3–10.1)
CHLORIDE SERPL-SCNC: 105 MMOL/L (ref 100–108)
CO2 SERPL-SCNC: 28 MMOL/L (ref 21–32)
CREAT SERPL-MCNC: 0.65 MG/DL (ref 0.6–1.3)
EOSINOPHIL # BLD AUTO: 0.2 THOUSAND/ΜL (ref 0–0.61)
EOSINOPHIL NFR BLD AUTO: 3 % (ref 0–6)
ERYTHROCYTE [DISTWIDTH] IN BLOOD BY AUTOMATED COUNT: 11.4 % (ref 11.6–15.1)
GFR SERPL CREATININE-BSD FRML MDRD: 112 ML/MIN/1.73SQ M
GLUCOSE SERPL-MCNC: 104 MG/DL (ref 65–140)
HCT VFR BLD AUTO: 33.3 % (ref 34.8–46.1)
HGB BLD-MCNC: 11.1 G/DL (ref 11.5–15.4)
IMM GRANULOCYTES # BLD AUTO: 0.01 THOUSAND/UL (ref 0–0.2)
IMM GRANULOCYTES NFR BLD AUTO: 0 % (ref 0–2)
LIPASE SERPL-CCNC: 208 U/L (ref 73–393)
LYMPHOCYTES # BLD AUTO: 1.89 THOUSANDS/ΜL (ref 0.6–4.47)
LYMPHOCYTES NFR BLD AUTO: 31 % (ref 14–44)
MCH RBC QN AUTO: 30.5 PG (ref 26.8–34.3)
MCHC RBC AUTO-ENTMCNC: 33.3 G/DL (ref 31.4–37.4)
MCV RBC AUTO: 92 FL (ref 82–98)
MONOCYTES # BLD AUTO: 0.35 THOUSAND/ΜL (ref 0.17–1.22)
MONOCYTES NFR BLD AUTO: 6 % (ref 4–12)
NEUTROPHILS # BLD AUTO: 3.64 THOUSANDS/ΜL (ref 1.85–7.62)
NEUTS SEG NFR BLD AUTO: 60 % (ref 43–75)
NRBC BLD AUTO-RTO: 0 /100 WBCS
PLATELET # BLD AUTO: 325 THOUSANDS/UL (ref 149–390)
PMV BLD AUTO: 9.3 FL (ref 8.9–12.7)
POTASSIUM SERPL-SCNC: 3.7 MMOL/L (ref 3.5–5.3)
PROT SERPL-MCNC: 7.7 G/DL (ref 6.4–8.2)
RBC # BLD AUTO: 3.64 MILLION/UL (ref 3.81–5.12)
SODIUM SERPL-SCNC: 141 MMOL/L (ref 136–145)
WBC # BLD AUTO: 6.11 THOUSAND/UL (ref 4.31–10.16)

## 2019-10-28 PROCEDURE — 36415 COLL VENOUS BLD VENIPUNCTURE: CPT

## 2019-10-28 PROCEDURE — 80048 BASIC METABOLIC PNL TOTAL CA: CPT

## 2019-10-28 PROCEDURE — 85025 COMPLETE CBC W/AUTO DIFF WBC: CPT

## 2019-10-28 PROCEDURE — 83690 ASSAY OF LIPASE: CPT

## 2019-10-28 PROCEDURE — 80076 HEPATIC FUNCTION PANEL: CPT

## 2019-10-31 ENCOUNTER — TELEPHONE (OUTPATIENT)
Dept: GASTROENTEROLOGY | Facility: AMBULARY SURGERY CENTER | Age: 39
End: 2019-10-31

## 2019-10-31 ENCOUNTER — TELEPHONE (OUTPATIENT)
Dept: GASTROENTEROLOGY | Facility: CLINIC | Age: 39
End: 2019-10-31

## 2019-10-31 DIAGNOSIS — K50.90 CROHN'S DISEASE IN REMISSION (HCC): ICD-10-CM

## 2019-10-31 RX ORDER — AZATHIOPRINE 50 MG/1
50 TABLET ORAL DAILY
Qty: 30 TABLET | Refills: 5 | Status: SHIPPED | OUTPATIENT
Start: 2019-10-31 | End: 2020-02-20 | Stop reason: SDUPTHER

## 2019-10-31 RX ORDER — AZATHIOPRINE 50 MG/1
50 TABLET ORAL DAILY
Qty: 30 TABLET | Refills: 2 | Status: CANCELLED | OUTPATIENT
Start: 2019-10-31

## 2019-10-31 NOTE — TELEPHONE ENCOUNTER
----- Message from Valeria Morillo MD sent at 10/31/2019  1:42 PM EDT -----  Please inform patient that recent laboratory studies were normal     As we discussed on last office visit she should consider scheduling colonoscopy soon to make sure that her inflammation has healed  Please have the patient call with any questions or concerns

## 2019-10-31 NOTE — TELEPHONE ENCOUNTER
Spoke to patient, notified of results    Pt is ready to schedule colonoscopy, I told her she will be receiving a call from our  radha as she prefers to go to [facility] [place]

## 2020-01-09 DIAGNOSIS — K50.90 CROHN'S DISEASE WITHOUT COMPLICATION, UNSPECIFIED GASTROINTESTINAL TRACT LOCATION (HCC): Primary | ICD-10-CM

## 2020-01-22 ENCOUNTER — ANESTHESIA (OUTPATIENT)
Dept: ANESTHESIOLOGY | Facility: HOSPITAL | Age: 40
End: 2020-01-22

## 2020-01-22 ENCOUNTER — ANESTHESIA EVENT (OUTPATIENT)
Dept: ANESTHESIOLOGY | Facility: HOSPITAL | Age: 40
End: 2020-01-22

## 2020-02-04 ENCOUNTER — ANESTHESIA EVENT (OUTPATIENT)
Dept: GASTROENTEROLOGY | Facility: AMBULARY SURGERY CENTER | Age: 40
End: 2020-02-04

## 2020-02-04 RX ORDER — SODIUM CHLORIDE, SODIUM LACTATE, POTASSIUM CHLORIDE, CALCIUM CHLORIDE 600; 310; 30; 20 MG/100ML; MG/100ML; MG/100ML; MG/100ML
100 INJECTION, SOLUTION INTRAVENOUS CONTINUOUS
Status: CANCELLED | OUTPATIENT
Start: 2020-02-04

## 2020-02-05 ENCOUNTER — HOSPITAL ENCOUNTER (OUTPATIENT)
Dept: GASTROENTEROLOGY | Facility: AMBULARY SURGERY CENTER | Age: 40
Setting detail: OUTPATIENT SURGERY
Discharge: HOME | End: 2020-02-05
Attending: INTERNAL MEDICINE | Admitting: INTERNAL MEDICINE
Payer: COMMERCIAL

## 2020-02-05 ENCOUNTER — ANESTHESIA (OUTPATIENT)
Dept: GASTROENTEROLOGY | Facility: AMBULARY SURGERY CENTER | Age: 40
End: 2020-02-05

## 2020-02-05 VITALS
SYSTOLIC BLOOD PRESSURE: 105 MMHG | RESPIRATION RATE: 18 BRPM | HEART RATE: 68 BPM | OXYGEN SATURATION: 99 % | BODY MASS INDEX: 23.18 KG/M2 | WEIGHT: 115 LBS | DIASTOLIC BLOOD PRESSURE: 66 MMHG | HEIGHT: 59 IN | TEMPERATURE: 97.8 F

## 2020-02-05 DIAGNOSIS — K50.90 CROHN'S DISEASE IN REMISSION (HCC): ICD-10-CM

## 2020-02-05 LAB
EXT PREGNANCY TEST URINE: NEGATIVE
EXT. CONTROL: NORMAL

## 2020-02-05 PROCEDURE — 45380 COLONOSCOPY AND BIOPSY: CPT | Performed by: INTERNAL MEDICINE

## 2020-02-05 PROCEDURE — 88305 TISSUE EXAM BY PATHOLOGIST: CPT | Performed by: PATHOLOGY

## 2020-02-05 PROCEDURE — 81025 URINE PREGNANCY TEST: CPT | Performed by: STUDENT IN AN ORGANIZED HEALTH CARE EDUCATION/TRAINING PROGRAM

## 2020-02-05 RX ORDER — PROPOFOL 10 MG/ML
INJECTION, EMULSION INTRAVENOUS AS NEEDED
Status: DISCONTINUED | OUTPATIENT
Start: 2020-02-05 | End: 2020-02-05 | Stop reason: SURG

## 2020-02-05 RX ORDER — SODIUM CHLORIDE 9 MG/ML
INJECTION, SOLUTION INTRAVENOUS CONTINUOUS PRN
Status: DISCONTINUED | OUTPATIENT
Start: 2020-02-05 | End: 2020-02-05

## 2020-02-05 RX ORDER — SODIUM CHLORIDE 9 MG/ML
INJECTION, SOLUTION INTRAVENOUS CONTINUOUS PRN
Status: DISCONTINUED | OUTPATIENT
Start: 2020-02-05 | End: 2020-02-05 | Stop reason: SURG

## 2020-02-05 RX ORDER — LIDOCAINE HYDROCHLORIDE 20 MG/ML
INJECTION, SOLUTION EPIDURAL; INFILTRATION; INTRACAUDAL; PERINEURAL AS NEEDED
Status: DISCONTINUED | OUTPATIENT
Start: 2020-02-05 | End: 2020-02-05 | Stop reason: SURG

## 2020-02-05 RX ORDER — PROPOFOL 10 MG/ML
INJECTION, EMULSION INTRAVENOUS CONTINUOUS PRN
Status: DISCONTINUED | OUTPATIENT
Start: 2020-02-05 | End: 2020-02-05 | Stop reason: SURG

## 2020-02-05 RX ADMIN — LIDOCAINE HYDROCHLORIDE 50 MG: 20 INJECTION, SOLUTION EPIDURAL; INFILTRATION; INTRACAUDAL; PERINEURAL at 12:45

## 2020-02-05 RX ADMIN — PROPOFOL 80 MG: 10 INJECTION, EMULSION INTRAVENOUS at 12:45

## 2020-02-05 RX ADMIN — PROPOFOL 100 MCG/KG/MIN: 10 INJECTION, EMULSION INTRAVENOUS at 12:45

## 2020-02-05 RX ADMIN — PROPOFOL 80 MG: 10 INJECTION, EMULSION INTRAVENOUS at 12:48

## 2020-02-05 RX ADMIN — SODIUM CHLORIDE: 0.9 INJECTION, SOLUTION INTRAVENOUS at 12:24

## 2020-02-05 NOTE — ANESTHESIA POSTPROCEDURE EVALUATION
Post-Op Assessment Note    CV Status:  Stable  Pain Score: 0    Pain management: adequate     Mental Status:  Alert and awake   Hydration Status:  Euvolemic   PONV Controlled:  Controlled   Airway Patency:  Patent   Post Op Vitals Reviewed: Yes      Staff: CRNA           BP 96/54 (02/05/20 1258)    Temp 97 8 °F (36 6 °C) (02/05/20 1258)    Pulse 76 (02/05/20 1258)   Resp (!) 24 (02/05/20 1258)    SpO2 97 % (02/05/20 1258)

## 2020-02-05 NOTE — H&P
History and Physical -  [facility]  Edmond Berry 44 y o  female MRN: [id number]    HPI: Edmond Berry is a 44y o  year old female who presents with follow up of Crohns disease  Review of Systems    Historical Information   History reviewed  No pertinent past medical history  Past Surgical History:   Procedure Laterality Date    COLONOSCOPY       Social History   Social History     Substance and Sexual Activity   Alcohol Use Not Currently     Social History     Substance and Sexual Activity   Drug Use No     Social History     Tobacco Use   Smoking Status Never Smoker   Smokeless Tobacco Never Used     Family History   Problem Relation Age of Onset    Colon cancer Neg Hx     Liver disease Neg Hx        Meds/Allergies       (Not in a hospital admission)    No Known Allergies    Objective     There were no vitals taken for this visit  PHYSICAL EXAM    Gen: NAD  CV: RRR  CHEST: Clear  ABD: soft, NT/ND  EXT: no edema  Neuro: AAO      ASSESSMENT/PLAN:  This is a 44y o  year old female here for follow up of Crohns disease         PLAN:   Procedure: colonoscopy

## 2020-02-05 NOTE — ANESTHESIA PREPROCEDURE EVALUATION
crohns in remission eval    Review of Systems/Medical History  Patient summary reviewed  Chart reviewed  No history of anesthetic complications     Cardiovascular  Negative cardio ROS Exercise tolerance (METS): >4,     Pulmonary  Negative pulmonary ROS        GI/Hepatic      Comment: crohns     Negative  ROS        Endo/Other  Negative endo/other ROS      GYN  Negative gynecology ROS          Hematology  Negative hematology ROS      Musculoskeletal  Negative musculoskeletal ROS        Neurology  Negative neurology ROS      Psychology   Negative psychology ROS              Physical Exam    Airway    Mallampati score: I  TM Distance: >3 FB  Neck ROM: full     Dental   No notable dental hx     Cardiovascular  Comment: Negative ROS,     Pulmonary      Other Findings        Anesthesia Plan  ASA Score- 1     Anesthesia Type- IV sedation with anesthesia with ASA Monitors  Additional Monitors:   Airway Plan:         Plan Factors-  Patient did not smoke on day of surgery  Induction- intravenous  Postoperative Plan-     Informed Consent- Anesthetic plan and risks discussed with patient  I personally reviewed this patient with the CRNA  Discussed and agreed on the Anesthesia Plan with the CRNA  Todd Cali

## 2020-02-12 ENCOUNTER — OFFICE VISIT (OUTPATIENT)
Dept: FAMILY MEDICINE CLINIC | Facility: CLINIC | Age: 40
End: 2020-02-12

## 2020-02-12 VITALS
DIASTOLIC BLOOD PRESSURE: 72 MMHG | BODY MASS INDEX: 24.39 KG/M2 | RESPIRATION RATE: 16 BRPM | SYSTOLIC BLOOD PRESSURE: 112 MMHG | HEART RATE: 72 BPM | HEIGHT: 59 IN | WEIGHT: 121 LBS | TEMPERATURE: 98.2 F

## 2020-02-12 DIAGNOSIS — Z11.4 SCREENING FOR HIV (HUMAN IMMUNODEFICIENCY VIRUS): ICD-10-CM

## 2020-02-12 DIAGNOSIS — Z00.00 ANNUAL PHYSICAL EXAM: Primary | ICD-10-CM

## 2020-02-12 PROCEDURE — 99395 PREV VISIT EST AGE 18-39: CPT | Performed by: FAMILY MEDICINE

## 2020-02-12 NOTE — PROGRESS NOTES
[address] [place]    NAME: Tessy Velazquez  AGE: 44 y o  SEX: female  : 1980     DATE: 2020     Assessment and Plan:     Problem List Items Addressed This Visit     None      Visit Diagnoses     Annual physical exam    -  Primary    Screening for HIV (human immunodeficiency virus)        Relevant Orders    HIV 1/2 AG-AB combo        Immunizations and preventive care screenings were discussed with patient today  Appropriate education was printed on patient's after visit summary  Counseling:  Alcohol/drug use: discussed moderation in alcohol intake, the recommendations for healthy alcohol use, and avoidance of illicit drug use  Dental Health: discussed importance of regular tooth brushing, flossing, and dental visits  Injury prevention: discussed safety/seat belts, safety helmets, smoke detectors, carbon dioxide detectors, and smoking near bedding or upholstery  Sexual health: discussed sexually transmitted diseases, partner selection, use of condoms, avoidance of unintended pregnancy, and contraceptive alternatives  · Exercise: the importance of regular exercise/physical activity was discussed  Recommend exercise 3-5 times per week for at least 30 minutes  No follow-ups on file  Chief Complaint:     Chief Complaint   Patient presents with    Physical Exam      History of Present Illness:     Adult Annual Physical   Patient here for a comprehensive physical exam  The patient reports no problems  We discussed her alcohol use which includes a glass of wine on the weekends and very rarely more than a few drinks in one night  We discussed her Crohns disease which is well controlled with Imuran - patient not currently experiencing any symptoms and follows with GI Dr Curvin Duane  She also follows with Dr Sanna Rdz - last seen 1 month ago - for frequent ear infections   She recently started exercising a couple of weeks ago  Her last pap smear was 2 years ago  Diet and Physical Activity  · Diet/Nutrition: well balanced diet  · Exercise: moderate cardiovascular exercise  Depression Screening  PHQ-9 Depression Screening    PHQ-9:    Frequency of the following problems over the past two weeks:       Little interest or pleasure in doing things:  0 - not at all  Feeling down, depressed, or hopeless:  0 - not at all  PHQ-2 Score:  0       General Health  · Sleep: sleeps well and gets 7-8 hours of sleep on average  · Hearing: normal - none   · Vision: no vision problems  · Dental: regular dental visits  /GYN Health  · Last menstrual period: 20  · Contraceptive method: Essure  · History of STDs?: no      Review of Systems:     Review of Systems   Constitutional: Negative for activity change, appetite change and fatigue  HENT: Negative for congestion, sinus pressure, sneezing and sore throat  Eyes: Negative for pain  Respiratory: Negative for cough and shortness of breath  Cardiovascular: Negative for chest pain and palpitations  Gastrointestinal: Negative for abdominal pain, constipation, diarrhea, nausea and vomiting  Genitourinary: Negative for dysuria and frequency  Musculoskeletal: Negative for back pain and neck pain  Skin: Negative for pallor and rash  Neurological: Negative for light-headedness and headaches  Psychiatric/Behavioral: The patient is not nervous/anxious         Past Medical History:     Past Medical History:   Diagnosis Date    Crohn disease ([facility] [address] )     Ear infection       Past Surgical History:     Past Surgical History:   Procedure Laterality Date     SECTION      COLONOSCOPY        Social History:     E-Cigarette/Vaping    E-Cigarette Use Never User      E-Cigarette/Vaping Substances    Nicotine No     Flavoring No      Social History     Socioeconomic History    Marital status: /Civil Union     Spouse name: None    Number of children: None    Years of education: None    Highest education level: None   Occupational History    None   Social Needs    Financial resource strain: Not hard at all   [employer] insecurity:     Worry: Never true     Inability: Never true   [employer] needs:     Medical: None     Non-medical: None   Tobacco Use    Smoking status: Never Smoker    Smokeless tobacco: Never Used   Substance and Sexual Activity    Alcohol use: Yes     Frequency: 2-4 times a month     Drinks per session: 1 or 2    Drug use: No    Sexual activity: Yes   Lifestyle    Physical activity:     Days per week: None     Minutes per session: None    Stress: None   Relationships    Social connections:     Talks on phone: None     Gets together: None     Attends Yazidi service: None     Active member of club or organization: None     Attends meetings of clubs or organizations: None     Relationship status: None    Intimate partner violence:     Fear of current or ex partner: None     Emotionally abused: None     Physically abused: None     Forced sexual activity: None   Other Topics Concern    None   Social History Narrative    None      Family History:     Family History   Problem Relation Age of Onset    Colon cancer Neg Hx     Liver disease Neg Hx       Current Medications:     Current Outpatient Medications   Medication Sig Dispense Refill    azaTHIOprine (IMURAN) 50 mg tablet Take 1 tablet (50 mg total) by mouth daily 30 tablet 5    ciprofloxacin-dexamethasone (CIPRODEX) otic suspension Administer 4 drops to the right ear 2 (two) times a day 7 5 mL 0    Glycerin, Laxative, (ADULT SUPPOSITORY RE) Insert into the rectum      Multiple Vitamins-Minerals (MULTIVITAMIN ADULT PO) Daily      Probiotic Product (PROBIOTIC-10 PO) Probiotic   1 tab daily       No current facility-administered medications for this visit         Allergies:     No Known Allergies   Physical Exam:     /72 (BP Location: Left arm, Patient Position: Sitting, Cuff Size: Standard)   Pulse 72   Temp 98 2 °F (36 8 °C) (Tympanic)   Resp 16   Ht 4' 11" (1 499 m)   Wt 54 9 kg (121 lb)   BMI 24 44 kg/m²     Physical Exam   Constitutional: She is oriented to person, place, and time  She appears well-developed and well-nourished  HENT:   Head: Normocephalic and atraumatic  Eyes: Pupils are equal, round, and reactive to light  Conjunctivae and EOM are normal    Neck: Normal range of motion  Neck supple  Cardiovascular: Normal rate, regular rhythm, normal heart sounds and intact distal pulses  Pulmonary/Chest: Effort normal and breath sounds normal    Abdominal: Soft  Bowel sounds are normal    Musculoskeletal: Normal range of motion  Neurological: She is alert and oriented to person, place, and time  Skin: Skin is warm and dry  Capillary refill takes less than 2 seconds  Psychiatric: She has a normal mood and affect  Her behavior is normal    Vitals reviewed        Maeve Gonzáles DO   [facility]

## 2020-02-12 NOTE — PATIENT INSTRUCTIONS

## 2020-02-20 DIAGNOSIS — K50.90 CROHN'S DISEASE IN REMISSION (HCC): ICD-10-CM

## 2020-02-21 RX ORDER — AZATHIOPRINE 50 MG/1
50 TABLET ORAL DAILY
Qty: 30 TABLET | Refills: 5 | Status: SHIPPED | OUTPATIENT
Start: 2020-02-21 | End: 2020-09-29

## 2020-02-26 ENCOUNTER — TELEPHONE (OUTPATIENT)
Dept: GASTROENTEROLOGY | Facility: CLINIC | Age: 40
End: 2020-02-26

## 2020-02-26 NOTE — TELEPHONE ENCOUNTER
----- Message from Abraham Matt MD sent at 2/23/2020  9:31 PM EST -----  Please inform the patient that biopsies from small bowel show active inflammation with her history of Crohn's disease but it is very mild  Please make sure that she continues current medications  I would like to see her back in the office in 6 to 8 weeks to discuss treatment options  Please have her call if she has any questions as this further

## 2020-02-26 NOTE — LETTER
February 26, 2020     [facility]  [place] [id number]      Dear Ms Danielle: We have attempted to reach you regarding your results  We ask that you please contact our office upon receipt of this letter to receive your results  Thank you in advance for your cooperation and assistance          Sincerely,   Eugenia Jaimes [facility] Staff  [phone]

## 2020-02-26 NOTE — TELEPHONE ENCOUNTER
Attempted to contact pt via telephone, lmom for pt to call office  Letter also sent    Follow up apt already made

## 2020-03-24 ENCOUNTER — TELEPHONE (OUTPATIENT)
Dept: GASTROENTEROLOGY | Facility: CLINIC | Age: 40
End: 2020-03-24

## 2020-03-24 NOTE — TELEPHONE ENCOUNTER
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 Please advise    Scheduled procedure/appointment date if applicable: Appt - 36/72/51

## 2020-03-25 NOTE — TELEPHONE ENCOUNTER
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

## 2020-03-26 ENCOUNTER — TELEPHONE (OUTPATIENT)
Dept: GASTROENTEROLOGY | Facility: AMBULARY SURGERY CENTER | Age: 40
End: 2020-03-26

## 2020-03-27 NOTE — TELEPHONE ENCOUNTER
Patient is aware Dr Esteban Klein is not in the office to sign letter  Will resend letter when signature obtained

## 2020-03-27 NOTE — TELEPHONE ENCOUNTER
Called patient to make aware Dr Quynh Neal will not be able to sign letter until he returns to office  Will resend letter when signature is obtained  **no answer/ busy x 3  Left message on spouse's phone to have patient return call

## 2020-03-30 ENCOUNTER — TELEPHONE (OUTPATIENT)
Dept: GASTROENTEROLOGY | Facility: AMBULARY SURGERY CENTER | Age: 40
End: 2020-03-30

## 2020-04-22 ENCOUNTER — TELEPHONE (OUTPATIENT)
Dept: GASTROENTEROLOGY | Facility: AMBULARY SURGERY CENTER | Age: 40
End: 2020-04-22

## 2020-04-27 ENCOUNTER — TELEMEDICINE (OUTPATIENT)
Dept: GASTROENTEROLOGY | Facility: CLINIC | Age: 40
End: 2020-04-27
Payer: COMMERCIAL

## 2020-04-27 DIAGNOSIS — K50.80 CROHN'S DISEASE OF BOTH SMALL AND LARGE INTESTINE WITHOUT COMPLICATION (HCC): Primary | ICD-10-CM

## 2020-04-27 PROCEDURE — 99213 OFFICE O/P EST LOW 20 MIN: CPT | Performed by: INTERNAL MEDICINE

## 2020-05-01 ENCOUNTER — TELEPHONE (OUTPATIENT)
Dept: GASTROENTEROLOGY | Facility: MEDICAL CENTER | Age: 40
End: 2020-05-01

## 2020-05-04 ENCOUNTER — APPOINTMENT (OUTPATIENT)
Dept: LAB | Facility: CLINIC | Age: 40
End: 2020-05-04
Payer: COMMERCIAL

## 2020-05-04 DIAGNOSIS — K50.80 CROHN'S DISEASE OF BOTH SMALL AND LARGE INTESTINE WITHOUT COMPLICATION (HCC): ICD-10-CM

## 2020-05-04 LAB
ALBUMIN SERPL BCP-MCNC: 4 G/DL (ref 3.5–5)
ALP SERPL-CCNC: 63 U/L (ref 46–116)
ALT SERPL W P-5'-P-CCNC: 27 U/L (ref 12–78)
ANION GAP SERPL CALCULATED.3IONS-SCNC: 6 MMOL/L (ref 4–13)
AST SERPL W P-5'-P-CCNC: 17 U/L (ref 5–45)
BASOPHILS # BLD AUTO: 0.03 THOUSANDS/ΜL (ref 0–0.1)
BASOPHILS NFR BLD AUTO: 1 % (ref 0–1)
BILIRUB DIRECT SERPL-MCNC: 0.17 MG/DL (ref 0–0.2)
BILIRUB SERPL-MCNC: 0.9 MG/DL (ref 0.2–1)
BUN SERPL-MCNC: 12 MG/DL (ref 5–25)
CALCIUM SERPL-MCNC: 8.6 MG/DL (ref 8.3–10.1)
CHLORIDE SERPL-SCNC: 105 MMOL/L (ref 100–108)
CO2 SERPL-SCNC: 27 MMOL/L (ref 21–32)
CREAT SERPL-MCNC: 0.72 MG/DL (ref 0.6–1.3)
CRP SERPL QL: <3 MG/L
EOSINOPHIL # BLD AUTO: 0.1 THOUSAND/ΜL (ref 0–0.61)
EOSINOPHIL NFR BLD AUTO: 2 % (ref 0–6)
ERYTHROCYTE [DISTWIDTH] IN BLOOD BY AUTOMATED COUNT: 12.5 % (ref 11.6–15.1)
GFR SERPL CREATININE-BSD FRML MDRD: 106 ML/MIN/1.73SQ M
GLUCOSE SERPL-MCNC: 84 MG/DL (ref 65–140)
HCT VFR BLD AUTO: 36.8 % (ref 34.8–46.1)
HGB BLD-MCNC: 12.1 G/DL (ref 11.5–15.4)
IMM GRANULOCYTES # BLD AUTO: 0.01 THOUSAND/UL (ref 0–0.2)
IMM GRANULOCYTES NFR BLD AUTO: 0 % (ref 0–2)
LIPASE SERPL-CCNC: 228 U/L (ref 73–393)
LYMPHOCYTES # BLD AUTO: 1.52 THOUSANDS/ΜL (ref 0.6–4.47)
LYMPHOCYTES NFR BLD AUTO: 32 % (ref 14–44)
MCH RBC QN AUTO: 30.9 PG (ref 26.8–34.3)
MCHC RBC AUTO-ENTMCNC: 32.9 G/DL (ref 31.4–37.4)
MCV RBC AUTO: 94 FL (ref 82–98)
MONOCYTES # BLD AUTO: 0.31 THOUSAND/ΜL (ref 0.17–1.22)
MONOCYTES NFR BLD AUTO: 7 % (ref 4–12)
NEUTROPHILS # BLD AUTO: 2.78 THOUSANDS/ΜL (ref 1.85–7.62)
NEUTS SEG NFR BLD AUTO: 58 % (ref 43–75)
NRBC BLD AUTO-RTO: 0 /100 WBCS
PLATELET # BLD AUTO: 261 THOUSANDS/UL (ref 149–390)
PMV BLD AUTO: 10.6 FL (ref 8.9–12.7)
POTASSIUM SERPL-SCNC: 4.1 MMOL/L (ref 3.5–5.3)
PROT SERPL-MCNC: 7.9 G/DL (ref 6.4–8.2)
RBC # BLD AUTO: 3.91 MILLION/UL (ref 3.81–5.12)
SODIUM SERPL-SCNC: 138 MMOL/L (ref 136–145)
WBC # BLD AUTO: 4.75 THOUSAND/UL (ref 4.31–10.16)

## 2020-05-04 PROCEDURE — 83690 ASSAY OF LIPASE: CPT

## 2020-05-04 PROCEDURE — 80076 HEPATIC FUNCTION PANEL: CPT

## 2020-05-04 PROCEDURE — 80048 BASIC METABOLIC PNL TOTAL CA: CPT

## 2020-05-04 PROCEDURE — 85025 COMPLETE CBC W/AUTO DIFF WBC: CPT

## 2020-05-04 PROCEDURE — 86140 C-REACTIVE PROTEIN: CPT

## 2020-05-04 PROCEDURE — 36415 COLL VENOUS BLD VENIPUNCTURE: CPT

## 2020-05-15 ENCOUNTER — TELEPHONE (OUTPATIENT)
Dept: GASTROENTEROLOGY | Facility: AMBULARY SURGERY CENTER | Age: 40
End: 2020-05-15

## 2020-06-03 ENCOUNTER — TELEPHONE (OUTPATIENT)
Dept: GASTROENTEROLOGY | Facility: CLINIC | Age: 40
End: 2020-06-03

## 2020-06-09 ENCOUNTER — OFFICE VISIT (OUTPATIENT)
Dept: GASTROENTEROLOGY | Facility: AMBULARY SURGERY CENTER | Age: 40
End: 2020-06-09
Payer: COMMERCIAL

## 2020-06-09 VITALS — WEIGHT: 116 LBS | HEART RATE: 93 BPM | BODY MASS INDEX: 23.43 KG/M2 | TEMPERATURE: 99.9 F

## 2020-06-09 DIAGNOSIS — K50.80 CROHN'S DISEASE OF BOTH SMALL AND LARGE INTESTINE WITHOUT COMPLICATION (HCC): Primary | ICD-10-CM

## 2020-06-09 PROCEDURE — 1036F TOBACCO NON-USER: CPT | Performed by: INTERNAL MEDICINE

## 2020-06-09 PROCEDURE — 99213 OFFICE O/P EST LOW 20 MIN: CPT | Performed by: INTERNAL MEDICINE

## 2020-06-16 LAB — SARS-COV-2 IGG SERPL QL IA: NEGATIVE

## 2020-06-26 ENCOUNTER — HOSPITAL ENCOUNTER (OUTPATIENT)
Dept: MRI IMAGING | Facility: HOSPITAL | Age: 40
Discharge: HOME | End: 2020-06-26
Attending: INTERNAL MEDICINE
Payer: COMMERCIAL

## 2020-06-26 DIAGNOSIS — K50.80 CROHN'S DISEASE OF BOTH SMALL AND LARGE INTESTINE WITHOUT COMPLICATION (HCC): ICD-10-CM

## 2020-06-26 PROCEDURE — 74183 MRI ABD W/O CNTR FLWD CNTR: CPT

## 2020-06-26 PROCEDURE — 72197 MRI PELVIS W/O & W/DYE: CPT

## 2020-06-26 PROCEDURE — A9585 GADOBUTROL INJECTION: HCPCS | Performed by: INTERNAL MEDICINE

## 2020-06-26 RX ADMIN — GADOBUTROL 5 ML: 604.72 INJECTION INTRAVENOUS at 09:17

## 2020-06-26 RX ADMIN — GLUCAGON HYDROCHLORIDE 1 MG: KIT at 09:05

## 2020-07-05 DIAGNOSIS — K50.80 CROHN'S DISEASE OF BOTH SMALL AND LARGE INTESTINE WITHOUT COMPLICATION (HCC): Primary | ICD-10-CM

## 2020-07-05 RX ORDER — MESALAMINE 500 MG/1
1000 CAPSULE, EXTENDED RELEASE ORAL 3 TIMES DAILY
Qty: 180 CAPSULE | Refills: 3 | Status: SHIPPED | OUTPATIENT
Start: 2020-07-05 | End: 2021-10-06

## 2020-07-14 ENCOUNTER — TELEPHONE (OUTPATIENT)
Dept: GASTROENTEROLOGY | Facility: MEDICAL CENTER | Age: 40
End: 2020-07-14

## 2020-07-14 NOTE — TELEPHONE ENCOUNTER
----- Message from Marilyn Arrieta MD sent at 7/5/2020 11:07 PM EDT -----  Please inform the patient that recent MRI was completely normal which is great  I recommend that she stop the azathioprine, take Pentasa 1000 mg 3 times daily  Please have her call with any questions or concerns in follow-up with me in the office in 3 months time

## 2020-07-17 ENCOUNTER — TELEPHONE (OUTPATIENT)
Dept: GASTROENTEROLOGY | Facility: AMBULARY SURGERY CENTER | Age: 40
End: 2020-07-17

## 2020-07-17 NOTE — TELEPHONE ENCOUNTER
Do we know of any alternate mesalamine that would be likely to be covered by her insurance? Options would include Lialda, Apriso, Asacol/Delzicol    Thank you

## 2020-07-17 NOTE — TELEPHONE ENCOUNTER
Patients GI provider:  Dr Bowman Close    Number to return call: (  [phone]    Reason for call: Pt calling to ask for an alternate or generic or pentasa due to cost    Scheduled procedure/appointment date if applicable: Apt/procedure 9-29-20

## 2020-07-17 NOTE — TELEPHONE ENCOUNTER
Message to provider for new Rx    Due to Pentasa too expensive for patient       Pharmacy: [facility])

## 2020-08-31 ENCOUNTER — TELEPHONE (OUTPATIENT)
Dept: GASTROENTEROLOGY | Facility: CLINIC | Age: 40
End: 2020-08-31

## 2020-08-31 DIAGNOSIS — K64.9 HEMORRHOIDS, UNSPECIFIED HEMORRHOID TYPE: Primary | ICD-10-CM

## 2020-08-31 RX ORDER — HYDROCORTISONE 25 MG/G
CREAM TOPICAL 2 TIMES DAILY
Qty: 28 G | Refills: 0 | Status: SHIPPED | OUTPATIENT
Start: 2020-08-31

## 2020-08-31 NOTE — TELEPHONE ENCOUNTER
Patient of Dr Masood Meier, last seen 6/9    History of Crohn's     Called patient to follow up  She states her hemorrhoids are causing her severe pain  She states she is using hydrocortisone cream, preparation H, and witch hazel, which offer mild relief  She states there are small amounts of blood but it is minimal, her main issue is the pain  She states today her pain isn't bad but every few days it will be extremely painful  She is also doing sitz baths  She states her bowel movements are regular and she does not have to strain  She wants to know what else she can do for the pain   Please advise, thanks

## 2020-08-31 NOTE — TELEPHONE ENCOUNTER
----- Message from Paloma Zavaleta sent at 8/27/2020 10:18 AM EDT -----  Regarding: Non-Urgent Medical Question  Contact: [phone]  Tammy Thomas,   I have hemorrhoidal issues for past almost over a month  A part of hemorrhoids is outside, giving me so much pain and blood  I am using hydrocortisone for on and off almost a month but no relief  What should i do?    Please give me a call, my no is   Thanks

## 2020-08-31 NOTE — TELEPHONE ENCOUNTER
Please advise, will prescribe Anusol HC rectal cream which she should use consistently twice daily for 7-10 days  Should schedule office visit; if she has persistent hemorrhoids despite this and despite regular bowel movements requiring no straining or prolonged periods of time sitting on the toilet, may then need consider referral to colorectal surgery for further management

## 2020-09-18 NOTE — TELEPHONE ENCOUNTER
Called patient to see if she received any response in regards to alternate medication due to Pentasa being costly, she has not , I will be calling pt RX insurance to see if there is a less pricey alternate medication

## 2020-09-29 ENCOUNTER — OFFICE VISIT (OUTPATIENT)
Dept: GASTROENTEROLOGY | Facility: AMBULARY SURGERY CENTER | Age: 40
End: 2020-09-29
Payer: COMMERCIAL

## 2020-09-29 VITALS — WEIGHT: 112 LBS | TEMPERATURE: 98 F | HEIGHT: 59 IN | BODY MASS INDEX: 22.58 KG/M2 | RESPIRATION RATE: 16 BRPM

## 2020-09-29 DIAGNOSIS — K64.8 INTERNAL HEMORRHOIDS WITH COMPLICATION: ICD-10-CM

## 2020-09-29 DIAGNOSIS — K50.00 CROHN'S DISEASE INVOLVING TERMINAL ILEUM (HCC): Primary | ICD-10-CM

## 2020-09-29 PROCEDURE — 99213 OFFICE O/P EST LOW 20 MIN: CPT | Performed by: INTERNAL MEDICINE

## 2020-09-29 RX ORDER — MESALAMINE 800 MG/1
1600 TABLET, DELAYED RELEASE ORAL 2 TIMES DAILY
Qty: 120 TABLET | Refills: 3 | Status: SHIPPED | OUTPATIENT
Start: 2020-09-29 | End: 2021-10-06

## 2020-09-29 RX ORDER — HYDROCORTISONE ACETATE 25 MG/1
25 SUPPOSITORY RECTAL 2 TIMES DAILY
Qty: 12 SUPPOSITORY | Refills: 3 | Status: SHIPPED | OUTPATIENT
Start: 2020-09-29 | End: 2021-11-15 | Stop reason: SDUPTHER

## 2020-09-29 NOTE — LETTER
September 29, 2020     [facility] [address] [facility]    Patient: Lizandro Kellogg   YOB: 1980   Date of Visit: 9/29/2020       Dear Dr Rodrigo Berman: Thank you for referring Lizandro Kellogg to me for evaluation  Below are my notes for this consultation  If you have questions, please do not hesitate to call me  I look forward to following your patient along with you  Sincerely,        Ladan Porras MD        CC: No Recipients  Ladan Porras MD  9/29/2020  1:21 PM  Sign when Signing Visit  [facility]) [facility]  Lizandro Kellogg 36 y o  female MRN: [id number]            Assessment & Plan:    Very pleasant 51-year-old female with a history of Crohn's ileitis, previously maintained on Imuran, recently transitioned to Pentasa  1  Crohn's ileitis:  Currently asymptomatic, clinically doing well, last endoscopy demonstrated only minimal ileal aphthous ulcers  -her disease has generally been difficult to pinpoint down with regards to severity, had been started on Imuran many years ago and had developed leukopenia with higher doses  -due to mild disease, overall endoscopic remission and long-term potential side effects of Imuran was stopped  -she is currently doing well on Pentasa  -insurance was not covering Pentasa well, we will try her on Asacol to see if this is covered  -we will check laboratory studies including fecal calprotectin, CRP  -she was instructed to give us call with any change or worsening symptoms  -we will see her back in 3 to 6 months  -consider retesting medications based on clinical symptoms  -discussed with her that if she were to have worsening symptoms, we would escalate her therapy to a biologic agent such as Entyvio    2  Internal hemorrhoid:  Symptomatic recent constipation  -recommended trial of Anusol suppository and Sitz baths              _____________________________________________________________        CC:   Follow-up of Crohn's ileitis    HPI:  Dionte Foley is a 36 y  o female who is here for follow-up  As you know this is a 51-year-old female with several years of Crohn's ileitis, had been maintained on Imuran, 50 milligrams but still had small aphthous ulcers in her terminal ileum  Due to risks and concerns of side effects and relative control symptoms, after discussion with the patient, we decided to stop her Imuran, she was started on Pentasa  She reports with Pentasa 1000 milligrams t i d  She has been doing quite well with regular, formed bowel movements  Occasionally in fact has constipation, recently after pushing and straining she had a protuberant hemorrhoid which was bleeding  She had to manually reduce this  Denies any abdominal pain, nausea, vomiting  Overall doing quite well  Reports her weight is good  She has been exercising  Back to work  Currently now working in the CT scan department rather than x-ray  ROS:  The remainder of the ROS was negative except for the pertinent positives mentioned in HPI  Allergies: Patient has no known allergies      Medications:   Current Outpatient Medications:     ciprofloxacin-dexamethasone (CIPRODEX) otic suspension, Administer 4 drops to the right ear 2 (two) times a day, Disp: 7 5 mL, Rfl: 0    Glycerin, Laxative, (ADULT SUPPOSITORY RE), Insert into the rectum, Disp: , Rfl:     hydrocortisone (ANUSOL-HC) 2 5 % rectal cream, Apply topically 2 (two) times a day Apply topically 2 times daily for 7-10 days, Disp: 28 g, Rfl: 0    mesalamine (PENTASA) 500 mg CR capsule, Take 2 capsules (1,000 mg total) by mouth 3 (three) times a day, Disp: 180 capsule, Rfl: 3    Multiple Vitamins-Minerals (MULTIVITAMIN ADULT PO), Daily, Disp: , Rfl:     Probiotic Product (PROBIOTIC-10 PO), Probiotic  1 tab daily, Disp: , Rfl:     hydrocortisone (ANUSOL-HC) 25 mg suppository, Insert 1 suppository (25 mg total) into the rectum 2 (two) times a day, Disp: 12 suppository, Rfl: 3    mesalamine (ASACOL) 800 MG EC tablet, Take 2 tablets (1,600 mg total) by mouth 2 (two) times a day, Disp: 120 tablet, Rfl: 3    Past Medical History:   Diagnosis Date    Crohn disease ([address] )     Ear infection        Past Surgical History:   Procedure Laterality Date     SECTION      COLONOSCOPY         Family History   Problem Relation Age of Onset    Colon cancer Neg Hx     Liver disease Neg Hx         reports that she has never smoked  She has never used smokeless tobacco  She reports current alcohol use of about 2 0 standard drinks of alcohol per week  She reports that she does not use drugs        Physical Exam:    Temp 98 °F (36 7 °C) (Temporal)   Resp 16   Ht 4' 11" (1 499 m)   Wt 50 8 kg (112 lb)   BMI 22 62 kg/m²     Gen: wn/wd, NAD, healthy-appearing female  HEENT: anicteric, MMM, no cervical LAD  CVS: RRR, no m/r/g  CHEST: CTA b/l  ABD: +BS, soft, NT,ND, no hepatosplenomegaly  EXT: no c/c/e  NEURO: aaox3  SKIN: NO rashes

## 2020-09-29 NOTE — PROGRESS NOTES
[facility]) [facility]  Sravan Wang 36 y o  female MRN: [id number]            Assessment & Plan:    Very pleasant 25-year-old female with a history of Crohn's ileitis, previously maintained on Imuran, recently transitioned to Pentasa  1  Crohn's ileitis:  Currently asymptomatic, clinically doing well, last endoscopy demonstrated only minimal ileal aphthous ulcers  -her disease has generally been difficult to pinpoint down with regards to severity, had been started on Imuran many years ago and had developed leukopenia with higher doses  -due to mild disease, overall endoscopic remission and long-term potential side effects of Imuran was stopped  -she is currently doing well on Pentasa  -insurance was not covering Pentasa well, we will try her on Asacol to see if this is covered  -we will check laboratory studies including fecal calprotectin, CRP  -she was instructed to give us call with any change or worsening symptoms  -we will see her back in 3 to 6 months  -consider retesting medications based on clinical symptoms  -discussed with her that if she were to have worsening symptoms, we would escalate her therapy to a biologic agent such as Entyvio    2  Internal hemorrhoid:  Symptomatic recent constipation  -recommended trial of Anusol suppository and Sitz baths              _____________________________________________________________        CC: Follow-up of Crohn's ileitis    HPI:  Sravan Wang is a 36 y  o female who is here for follow-up  As you know this is a 25-year-old female with several years of Crohn's ileitis, had been maintained on Imuran, 50 milligrams but still had small aphthous ulcers in her terminal ileum  Due to risks and concerns of side effects and relative control symptoms, after discussion with the patient, we decided to stop her Imuran, she was started on Pentasa  She reports with Pentasa 1000 milligrams t i d   She has been doing quite well with regular, formed bowel movements  Occasionally in fact has constipation, recently after pushing and straining she had a protuberant hemorrhoid which was bleeding  She had to manually reduce this  Denies any abdominal pain, nausea, vomiting  Overall doing quite well  Reports her weight is good  She has been exercising  Back to work  Currently now working in the CT scan department rather than x-ray  ROS:  The remainder of the ROS was negative except for the pertinent positives mentioned in HPI  Allergies: Patient has no known allergies  Medications:   Current Outpatient Medications:     ciprofloxacin-dexamethasone (CIPRODEX) otic suspension, Administer 4 drops to the right ear 2 (two) times a day, Disp: 7 5 mL, Rfl: 0    Glycerin, Laxative, (ADULT SUPPOSITORY RE), Insert into the rectum, Disp: , Rfl:     hydrocortisone (ANUSOL-HC) 2 5 % rectal cream, Apply topically 2 (two) times a day Apply topically 2 times daily for 7-10 days, Disp: 28 g, Rfl: 0    mesalamine (PENTASA) 500 mg CR capsule, Take 2 capsules (1,000 mg total) by mouth 3 (three) times a day, Disp: 180 capsule, Rfl: 3    Multiple Vitamins-Minerals (MULTIVITAMIN ADULT PO), Daily, Disp: , Rfl:     Probiotic Product (PROBIOTIC-10 PO), Probiotic  1 tab daily, Disp: , Rfl:     hydrocortisone (ANUSOL-HC) 25 mg suppository, Insert 1 suppository (25 mg total) into the rectum 2 (two) times a day, Disp: 12 suppository, Rfl: 3    mesalamine (ASACOL) 800 MG EC tablet, Take 2 tablets (1,600 mg total) by mouth 2 (two) times a day, Disp: 120 tablet, Rfl: 3    Past Medical History:   Diagnosis Date    Crohn disease ([address] )     Ear infection        Past Surgical History:   Procedure Laterality Date     SECTION      COLONOSCOPY         Family History   Problem Relation Age of Onset    Colon cancer Neg Hx     Liver disease Neg Hx         reports that she has never smoked   She has never used smokeless tobacco  She reports current alcohol use of about 2 0 standard drinks of alcohol per week  She reports that she does not use drugs        Physical Exam:    Temp 98 °F (36 7 °C) (Temporal)   Resp 16   Ht 4' 11" (1 499 m)   Wt 50 8 kg (112 lb)   BMI 22 62 kg/m²     Gen: wn/wd, NAD, healthy-appearing female  HEENT: anicteric, MMM, no cervical LAD  CVS: RRR, no m/r/g  CHEST: CTA b/l  ABD: +BS, soft, NT,ND, no hepatosplenomegaly  EXT: no c/c/e  NEURO: aaox3  SKIN: NO rashes

## 2020-10-23 ENCOUNTER — TELEPHONE (OUTPATIENT)
Dept: FAMILY MEDICINE CLINIC | Facility: CLINIC | Age: 40
End: 2020-10-23

## 2020-11-04 ENCOUNTER — OFFICE VISIT (OUTPATIENT)
Dept: OBGYN CLINIC | Facility: CLINIC | Age: 40
End: 2020-11-04
Payer: COMMERCIAL

## 2020-11-04 VITALS
TEMPERATURE: 97.6 F | WEIGHT: 116 LBS | DIASTOLIC BLOOD PRESSURE: 72 MMHG | HEIGHT: 59 IN | SYSTOLIC BLOOD PRESSURE: 98 MMHG | BODY MASS INDEX: 23.39 KG/M2

## 2020-11-04 DIAGNOSIS — N93.9 ABNORMAL UTERINE BLEEDING (AUB): Primary | ICD-10-CM

## 2020-11-04 DIAGNOSIS — Z98.890 HISTORY OF FEMALE STERILIZATION: ICD-10-CM

## 2020-11-04 PROCEDURE — 99202 OFFICE O/P NEW SF 15 MIN: CPT | Performed by: STUDENT IN AN ORGANIZED HEALTH CARE EDUCATION/TRAINING PROGRAM

## 2020-11-04 RX ORDER — TRANEXAMIC ACID 650 1/1
1300 TABLET ORAL 3 TIMES DAILY
Qty: 5 TABLET | Refills: 4 | Status: SHIPPED | OUTPATIENT
Start: 2020-11-04 | End: 2021-01-05

## 2020-11-27 ENCOUNTER — OFFICE VISIT (OUTPATIENT)
Dept: URGENT CARE | Facility: MEDICAL CENTER | Age: 40
End: 2020-11-27
Payer: COMMERCIAL

## 2020-11-27 VITALS
HEIGHT: 59 IN | OXYGEN SATURATION: 99 % | RESPIRATION RATE: 16 BRPM | DIASTOLIC BLOOD PRESSURE: 62 MMHG | TEMPERATURE: 97 F | SYSTOLIC BLOOD PRESSURE: 153 MMHG | HEART RATE: 90 BPM | WEIGHT: 110 LBS | BODY MASS INDEX: 22.18 KG/M2

## 2020-11-27 DIAGNOSIS — R30.0 DYSURIA: Primary | ICD-10-CM

## 2020-11-27 LAB
SL AMB  POCT GLUCOSE, UA: NEGATIVE
SL AMB LEUKOCYTE ESTERASE,UA: ABNORMAL
SL AMB POCT BILIRUBIN,UA: NEGATIVE
SL AMB POCT BLOOD,UA: ABNORMAL
SL AMB POCT CLARITY,UA: ABNORMAL
SL AMB POCT COLOR,UA: YELLOW
SL AMB POCT KETONES,UA: NEGATIVE
SL AMB POCT NITRITE,UA: NEGATIVE
SL AMB POCT PH,UA: 5
SL AMB POCT SPECIFIC GRAVITY,UA: 1.03
SL AMB POCT URINE PROTEIN: 100
SL AMB POCT UROBILINOGEN: 0.2

## 2020-11-27 PROCEDURE — 87086 URINE CULTURE/COLONY COUNT: CPT | Performed by: PHYSICIAN ASSISTANT

## 2020-11-27 PROCEDURE — G0382 LEV 3 HOSP TYPE B ED VISIT: HCPCS | Performed by: PHYSICIAN ASSISTANT

## 2020-11-27 PROCEDURE — 81002 URINALYSIS NONAUTO W/O SCOPE: CPT | Performed by: PHYSICIAN ASSISTANT

## 2020-11-27 RX ORDER — PHENAZOPYRIDINE HYDROCHLORIDE 100 MG/1
100 TABLET, FILM COATED ORAL 3 TIMES DAILY PRN
Qty: 10 TABLET | Refills: 0 | Status: SHIPPED | OUTPATIENT
Start: 2020-11-27 | End: 2021-01-05

## 2020-11-27 RX ORDER — CEPHALEXIN 500 MG/1
500 CAPSULE ORAL EVERY 8 HOURS SCHEDULED
Qty: 21 CAPSULE | Refills: 0 | Status: SHIPPED | OUTPATIENT
Start: 2020-11-27 | End: 2020-12-04

## 2020-11-28 LAB — BACTERIA UR CULT: NORMAL

## 2020-12-30 ENCOUNTER — TELEPHONE (OUTPATIENT)
Dept: GASTROENTEROLOGY | Facility: CLINIC | Age: 40
End: 2020-12-30

## 2021-01-05 ENCOUNTER — TELEMEDICINE (OUTPATIENT)
Dept: OBGYN CLINIC | Facility: CLINIC | Age: 41
End: 2021-01-05
Payer: COMMERCIAL

## 2021-01-05 DIAGNOSIS — N94.6 MENSES PAINFUL: Primary | ICD-10-CM

## 2021-01-05 DIAGNOSIS — N89.8 VAGINAL DRYNESS: ICD-10-CM

## 2021-01-05 DIAGNOSIS — N93.9 ABNORMAL UTERINE BLEEDING (AUB): ICD-10-CM

## 2021-01-05 PROCEDURE — 99213 OFFICE O/P EST LOW 20 MIN: CPT | Performed by: STUDENT IN AN ORGANIZED HEALTH CARE EDUCATION/TRAINING PROGRAM

## 2021-01-05 RX ORDER — TRANEXAMIC ACID 650 1/1
1300 TABLET ORAL 3 TIMES DAILY
Qty: 5 TABLET | Refills: 4 | Status: SHIPPED | OUTPATIENT
Start: 2021-01-05

## 2021-01-05 NOTE — ASSESSMENT & PLAN NOTE
Reviewed lubricants to assist  May be contributing to postcoital irritation  Reviewed pericare postcoital in addition to assist with irritation

## 2021-01-05 NOTE — PROGRESS NOTES
Virtual Regular Visit      Assessment/Plan:    Problem List Items Addressed This Visit        Genitourinary    Abnormal uterine bleeding (AUB)     Improved with TXA         Relevant Medications    Tranexamic Acid 650 MG TABS    Menses painful - Primary     Improved with TXA - no further debilitating cramps! Will continue to use TXA as needed for avoid heaving bleeding causing clots and cervical irritation and cramping         Vaginal dryness     Reviewed lubricants to assist  May be contributing to postcoital irritation  Reviewed pericare postcoital in addition to assist with irritation             Plan to return for annual GYN exam or PRN  Reason for visit is   Chief Complaint   Patient presents with    Virtual Regular Visit    Virtual Regular Visit        Encounter provider Emanuel Myles MD    Provider located at [address] [address]      Recent Visits  No visits were found meeting these conditions  Showing recent visits within past 7 days and meeting all other requirements     Today's Visits  Date Type Provider Dept   01/05/21 Paulo Andujar MD [facility] [facility] today's visits and meeting all other requirements     Future Appointments  No visits were found meeting these conditions  Showing future appointments within next 150 days and meeting all other requirements        The patient was identified by name and date of birth  Katrin Salguero was informed that this is a telemedicine visit and that the visit is being conducted through [employer] and patient was informed that this is a secure, HIPAA-compliant platform  She agrees to proceed     My office door was closed  No one else was in the room  She acknowledged consent and understanding of privacy and security of the video platform   The patient has agreed to participate and understands they can discontinue the visit at any time  Patient is aware this is a billable service  Edd Encarnacion is a 36 y o  female presents for followup   Has been doing well with TXA, titrating as needed, has not used full dosage  Only needed one day last period, noted improvement in both cramping and bleeding  Last period only had bleeding which is not as bothersome to her as much as the cramping  Happy with this plan to use moving forward to manage her symptoms  Also notes postcoital irritation vaginally, resolved spontaneously after 1 day  No abnormal discharge  Does note some vaginal dryness recently, not currently using any intervention  Past Medical History:   Diagnosis Date    Anxiety     Auto[facility] disorder [facility] [address] [facility]     Crohn disease [facility] [address] [facility]     Ear infection        Past Surgical History:   Procedure Laterality Date     SECTION      COLONOSCOPY         Current Outpatient Medications   Medication Sig Dispense Refill    ciprofloxacin-dexamethasone (CIPRODEX) otic suspension Administer 4 drops to the right ear 2 (two) times a day 7 5 mL 0    hydrocortisone (ANUSOL-HC) 2 5 % rectal cream Apply topically 2 (two) times a day Apply topically 2 times daily for 7-10 days 28 g 0    hydrocortisone (ANUSOL-HC) 25 mg suppository Insert 1 suppository (25 mg total) into the rectum 2 (two) times a day 12 suppository 3    mesalamine (ASACOL) 800 MG EC tablet Take 2 tablets (1,600 mg total) by mouth 2 (two) times a day 120 tablet 3    Multiple Vitamins-Minerals (MULTIVITAMIN ADULT PO) Daily      Probiotic Product (PROBIOTIC-10 PO) Probiotic   1 tab daily      mesalamine (PENTASA) 500 mg CR capsule Take 2 capsules (1,000 mg total) by mouth 3 (three) times a day 180 capsule 3    Tranexamic Acid 650 MG TABS Take 2 tablets (1,300 mg total) by mouth 3 (three) times a day 5 tablet 4     No current facility-administered medications for this visit           No Known Allergies    Review of Systems   All other systems reviewed and are negative  Video Exam    There were no vitals filed for this visit  Physical Exam  Constitutional:       Appearance: Normal appearance  HENT:      Head: Normocephalic and atraumatic  Pulmonary:      Effort: Pulmonary effort is normal    Neurological:      Mental Status: She is alert  Psychiatric:         Mood and Affect: Mood normal          Behavior: Behavior normal           I spent 8 minutes directly with the patient during this visit      VIRTUAL VISIT DISCLAIMER    Jimmytrudy Nayak acknowledges that she has consented to an online visit or consultation  She understands that the online visit is based solely on information provided by her, and that, in the absence of a face-to-face physical evaluation by the physician, the diagnosis she receives is both limited and provisional in terms of accuracy and completeness  This is not intended to replace a full medical face-to-face evaluation by the physician  Ghulam Nayak understands and accepts these terms

## 2021-01-05 NOTE — ASSESSMENT & PLAN NOTE
Improved with TXA - no further debilitating cramps!   Will continue to use TXA as needed for avoid heaving bleeding causing clots and cervical irritation and cramping

## 2021-01-08 ENCOUNTER — IMMUNIZATIONS (OUTPATIENT)
Dept: FAMILY MEDICINE CLINIC | Facility: HOSPITAL | Age: 41
End: 2021-01-08

## 2021-01-08 DIAGNOSIS — Z23 ENCOUNTER FOR IMMUNIZATION: ICD-10-CM

## 2021-01-08 PROCEDURE — 91301 SARS-COV-2 / COVID-19 MRNA VACCINE (MODERNA) 100 MCG: CPT

## 2021-01-08 PROCEDURE — 0011A SARS-COV-2 / COVID-19 MRNA VACCINE (MODERNA) 100 MCG: CPT

## 2021-01-13 ENCOUNTER — TELEPHONE (OUTPATIENT)
Dept: GASTROENTEROLOGY | Facility: AMBULARY SURGERY CENTER | Age: 41
End: 2021-01-13

## 2021-01-13 NOTE — TELEPHONE ENCOUNTER
----- Message from Jayden Montano sent at 1/12/2021  3:16 PM EST -----  Regarding: Non-Urgent Medical Question  Contact: [phone]  [facility]   Need an appointment  I have abd pain for a few days  No diarrhea no vomiting just upper abd pain in the middle  Tried to call the office many times to set up appointments but hours and hours i was on hold  I am takings over the counter omeraprazole, it actually helps me      Thanks,   Neeraj

## 2021-01-14 ENCOUNTER — LAB (OUTPATIENT)
Dept: LAB | Facility: HOSPITAL | Age: 41
End: 2021-01-14
Attending: INTERNAL MEDICINE
Payer: COMMERCIAL

## 2021-01-14 DIAGNOSIS — K50.00 CROHN'S DISEASE INVOLVING TERMINAL ILEUM (HCC): ICD-10-CM

## 2021-01-14 LAB
ALBUMIN SERPL BCP-MCNC: 3.6 G/DL (ref 3.5–5)
ALP SERPL-CCNC: 138 U/L (ref 46–116)
ALT SERPL W P-5'-P-CCNC: 94 U/L (ref 12–78)
ANION GAP SERPL CALCULATED.3IONS-SCNC: 9 MMOL/L (ref 4–13)
AST SERPL W P-5'-P-CCNC: 45 U/L (ref 5–45)
BASOPHILS # BLD AUTO: 0.04 THOUSANDS/ΜL (ref 0–0.1)
BASOPHILS NFR BLD AUTO: 1 % (ref 0–1)
BILIRUB DIRECT SERPL-MCNC: 0.16 MG/DL (ref 0–0.2)
BILIRUB SERPL-MCNC: 0.4 MG/DL (ref 0.2–1)
BUN SERPL-MCNC: 11 MG/DL (ref 5–25)
CALCIUM SERPL-MCNC: 9 MG/DL (ref 8.3–10.1)
CHLORIDE SERPL-SCNC: 104 MMOL/L (ref 100–108)
CO2 SERPL-SCNC: 26 MMOL/L (ref 21–32)
CREAT SERPL-MCNC: 0.7 MG/DL (ref 0.6–1.3)
CRP SERPL QL: 8.2 MG/L
EOSINOPHIL # BLD AUTO: 0.21 THOUSAND/ΜL (ref 0–0.61)
EOSINOPHIL NFR BLD AUTO: 3 % (ref 0–6)
ERYTHROCYTE [DISTWIDTH] IN BLOOD BY AUTOMATED COUNT: 11.8 % (ref 11.6–15.1)
GFR SERPL CREATININE-BSD FRML MDRD: 109 ML/MIN/1.73SQ M
GLUCOSE P FAST SERPL-MCNC: 85 MG/DL (ref 65–99)
HCT VFR BLD AUTO: 33.4 % (ref 34.8–46.1)
HGB BLD-MCNC: 11.1 G/DL (ref 11.5–15.4)
IMM GRANULOCYTES # BLD AUTO: 0.03 THOUSAND/UL (ref 0–0.2)
IMM GRANULOCYTES NFR BLD AUTO: 1 % (ref 0–2)
LYMPHOCYTES # BLD AUTO: 1.47 THOUSANDS/ΜL (ref 0.6–4.47)
LYMPHOCYTES NFR BLD AUTO: 23 % (ref 14–44)
MCH RBC QN AUTO: 29.4 PG (ref 26.8–34.3)
MCHC RBC AUTO-ENTMCNC: 33.2 G/DL (ref 31.4–37.4)
MCV RBC AUTO: 88 FL (ref 82–98)
MONOCYTES # BLD AUTO: 0.36 THOUSAND/ΜL (ref 0.17–1.22)
MONOCYTES NFR BLD AUTO: 6 % (ref 4–12)
NEUTROPHILS # BLD AUTO: 4.21 THOUSANDS/ΜL (ref 1.85–7.62)
NEUTS SEG NFR BLD AUTO: 66 % (ref 43–75)
NRBC BLD AUTO-RTO: 0 /100 WBCS
PLATELET # BLD AUTO: 332 THOUSANDS/UL (ref 149–390)
PMV BLD AUTO: 10.4 FL (ref 8.9–12.7)
POTASSIUM SERPL-SCNC: 3.5 MMOL/L (ref 3.5–5.3)
PROT SERPL-MCNC: 8 G/DL (ref 6.4–8.2)
RBC # BLD AUTO: 3.78 MILLION/UL (ref 3.81–5.12)
SODIUM SERPL-SCNC: 139 MMOL/L (ref 136–145)
WBC # BLD AUTO: 6.32 THOUSAND/UL (ref 4.31–10.16)

## 2021-01-14 PROCEDURE — 80076 HEPATIC FUNCTION PANEL: CPT

## 2021-01-14 PROCEDURE — 85025 COMPLETE CBC W/AUTO DIFF WBC: CPT

## 2021-01-14 PROCEDURE — 80048 BASIC METABOLIC PNL TOTAL CA: CPT

## 2021-01-14 PROCEDURE — 86140 C-REACTIVE PROTEIN: CPT

## 2021-01-14 PROCEDURE — 36415 COLL VENOUS BLD VENIPUNCTURE: CPT

## 2021-01-15 ENCOUNTER — APPOINTMENT (OUTPATIENT)
Dept: LAB | Facility: CLINIC | Age: 41
End: 2021-01-15
Payer: COMMERCIAL

## 2021-01-15 ENCOUNTER — TRANSCRIBE ORDERS (OUTPATIENT)
Dept: LAB | Facility: CLINIC | Age: 41
End: 2021-01-15

## 2021-01-15 ENCOUNTER — OFFICE VISIT (OUTPATIENT)
Dept: GASTROENTEROLOGY | Facility: AMBULARY SURGERY CENTER | Age: 41
End: 2021-01-15
Payer: COMMERCIAL

## 2021-01-15 VITALS
WEIGHT: 110 LBS | SYSTOLIC BLOOD PRESSURE: 108 MMHG | HEIGHT: 59 IN | DIASTOLIC BLOOD PRESSURE: 58 MMHG | BODY MASS INDEX: 22.18 KG/M2

## 2021-01-15 DIAGNOSIS — K50.00 CROHN'S DISEASE INVOLVING TERMINAL ILEUM (HCC): Primary | ICD-10-CM

## 2021-01-15 DIAGNOSIS — R10.13 EPIGASTRIC PAIN: ICD-10-CM

## 2021-01-15 DIAGNOSIS — K50.00 CROHN'S DISEASE INVOLVING TERMINAL ILEUM (HCC): ICD-10-CM

## 2021-01-15 LAB
CHOLEST SERPL-MCNC: 165 MG/DL (ref 50–200)
HDLC SERPL-MCNC: 61 MG/DL
LDLC SERPL CALC-MCNC: 89 MG/DL (ref 0–100)
NONHDLC SERPL-MCNC: 104 MG/DL
TRIGL SERPL-MCNC: 76 MG/DL

## 2021-01-15 PROCEDURE — 36415 COLL VENOUS BLD VENIPUNCTURE: CPT

## 2021-01-15 PROCEDURE — 99213 OFFICE O/P EST LOW 20 MIN: CPT | Performed by: INTERNAL MEDICINE

## 2021-01-15 PROCEDURE — 80061 LIPID PANEL: CPT

## 2021-01-15 RX ORDER — OMEPRAZOLE 20 MG/1
20 CAPSULE, DELAYED RELEASE ORAL 2 TIMES DAILY
COMMUNITY
End: 2021-04-14

## 2021-01-15 RX ORDER — PANTOPRAZOLE SODIUM 40 MG/1
40 TABLET, DELAYED RELEASE ORAL DAILY
Qty: 30 TABLET | Refills: 3 | Status: SHIPPED | OUTPATIENT
Start: 2021-01-15 | End: 2021-07-14 | Stop reason: SDUPTHER

## 2021-01-15 NOTE — PROGRESS NOTES
[facility]  Stone Fields 36 y o  female MRN: [id number]            Assessment & Plan:    72-year-old female with history of ileal Crohn's disease, had been maintained on Imuran for many years without any significant improvement or progression of disease  Transition to mesalamine agents most recently  Now with several weeks of epigastric pain  1  Epigastric pain:  Clinically consistent with gastritis versus peptic ulcer disease likely exacerbated by recent alcohol use  -we will check lipase  -given elevated LFTs will check a right upper quadrant ultrasound to evaluate for biliary etiology  -continue PPI therapy, pantoprazole 40 mg in the morning, famotidine 40 mg in the evening if needed  -if symptoms do not improve in next 4 weeks, will proceed with EGD    2  Ileal Crohn's disease:  Clinically much improved  -continue mesalamine  -recent laboratory studies demonstrated elevated CRP, most likely this is due to COVID-19 vaccine, will continue to monitor  -we will repeat laboratory studies including CRP for her 2nd shot of vaccine  3  Elevated LFTs:  Unclear etiology, suspect secondary to vaccination  -recommend checking right upper quadrant ultrasound, will repeat LFTs in several weeks prior to the next vaccine shot              _____________________________________________________________        CC:  Epigastric abdominal pain    HPI:  Stone Fields is a 36 y  o female who is here for epigastric pain  As you know this is a 72-year-old female with ileal Crohn's disease, had been on Imuran for many years, we tapered her off to mesalamine agent alone due to no progression of disease  Patient had been doing relatively well, recently reported since December having epigastric pain  She notes that she has had similar pain in the past about 10 years ago which responded to PPI therapy    Patient had taking over-the-counter PPI with improvement in symptoms, after new year's Myrtle drinking moderate amount and eating out she had worsening symptoms  Now presents for follow-up  She reports that since doubling up on PPI therapy 20 mg b i d  Over-the-counter she has had improvement in symptoms  She denies any nausea, vomiting, dysphagia  Denies any change in bowel movements  Recently she has had mild loose stools but denies any melena or rectal bleeding  Her Crohn symptoms are much improved  No weight has been stable  She recently had COVID-19 vaccination, most recent laboratory studies demonstrated mild elevation of CRP, ALT, AST and alkaline phosphatase  ROS:  The remainder of the ROS was negative except for the pertinent positives mentioned in HPI  Allergies: Patient has no known allergies      Medications:   Current Outpatient Medications:     hydrocortisone (ANUSOL-HC) 2 5 % rectal cream, Apply topically 2 (two) times a day Apply topically 2 times daily for 7-10 days, Disp: 28 g, Rfl: 0    hydrocortisone (ANUSOL-HC) 25 mg suppository, Insert 1 suppository (25 mg total) into the rectum 2 (two) times a day, Disp: 12 suppository, Rfl: 3    mesalamine (ASACOL) 800 MG EC tablet, Take 2 tablets (1,600 mg total) by mouth 2 (two) times a day, Disp: 120 tablet, Rfl: 3    Multiple Vitamins-Minerals (MULTIVITAMIN ADULT PO), Daily, Disp: , Rfl:     omeprazole (PriLOSEC) 20 mg delayed release capsule, Take 20 mg by mouth 2 (two) times a day, Disp: , Rfl:     Probiotic Product (PROBIOTIC-10 PO), Probiotic  1 tab daily, Disp: , Rfl:     Tranexamic Acid 650 MG TABS, Take 2 tablets (1,300 mg total) by mouth 3 (three) times a day, Disp: 5 tablet, Rfl: 4    ciprofloxacin-dexamethasone (CIPRODEX) otic suspension, Administer 4 drops to the right ear 2 (two) times a day (Patient not taking: Reported on 1/15/2021), Disp: 7 5 mL, Rfl: 0    mesalamine (PENTASA) 500 mg CR capsule, Take 2 capsules (1,000 mg total) by mouth 3 (three) times a day, Disp: 180 capsule, Rfl: 3    pantoprazole (PROTONIX) 40 mg tablet, Take 1 tablet (40 mg total) by mouth daily, Disp: 30 tablet, Rfl: 3    Past Medical History:   Diagnosis Date    Anxiety     Autoimmune disorder (HCC)     Crohn disease ([address] )     Ear infection        Past Surgical History:   Procedure Laterality Date     SECTION      COLONOSCOPY      UPPER GASTROINTESTINAL ENDOSCOPY         Family History   Problem Relation Age of Onset    Obesity Mother     Hypertension Mother     Obesity Father     Hypertension Father     Colon cancer Neg Hx     Liver disease Neg Hx         reports that she has never smoked  She has never used smokeless tobacco  She reports current alcohol use of about 2 0 standard drinks of alcohol per week  She reports that she does not use drugs        Physical Exam:    /58 (BP Location: Left arm, Patient Position: Sitting, Cuff Size: Standard)   Ht 4' 11" (1 499 m)   Wt 49 9 kg (110 lb)   BMI 22 22 kg/m²     Gen: wn/wd, NAD, healthy-appearing female  HEENT: anicteric, MMM, no cervical LAD  CVS: RRR, no m/r/g  CHEST: CTA b/l  ABD: +BS, soft, mild epigastric discomfort with deep palpation, no hepatosplenomegaly  EXT: no c/c/e  NEURO: aaox3  SKIN: NO rashes

## 2021-01-15 NOTE — LETTER
January 15, 2021     Antonio NealDany [address]gad[address] [facility]    Patient: Cynthia Mathews   YOB: 1980   Date of Visit: 1/15/2021       Dear Dr Trace Molina: Thank you for referring Cynthia Mathews to me for evaluation  Below are my notes for this consultation  If you have questions, please do not hesitate to call me  I look forward to following your patient along with you  Sincerely,        Lupe Gutierrez MD        CC: No Recipients  Lupe Gutierrez MD  1/15/2021  6:24 PM  Sign when Signing Visit  [facility]) [facility]  Cynthia Mathews 36 y o  female MRN: [id number]            Assessment & Plan:    22-year-old female with history of ileal Crohn's disease, had been maintained on Imuran for many years without any significant improvement or progression of disease  Transition to mesalamine agents most recently  Now with several weeks of epigastric pain  1  Epigastric pain:  Clinically consistent with gastritis versus peptic ulcer disease likely exacerbated by recent alcohol use  -we will check lipase  -given elevated LFTs will check a right upper quadrant ultrasound to evaluate for biliary etiology  -continue PPI therapy, pantoprazole 40 mg in the morning, famotidine 40 mg in the evening if needed  -if symptoms do not improve in next 4 weeks, will proceed with EGD    2  Ileal Crohn's disease:  Clinically much improved  -continue mesalamine  -recent laboratory studies demonstrated elevated CRP, most likely this is due to COVID-19 vaccine, will continue to monitor  -we will repeat laboratory studies including CRP for her 2nd shot of vaccine      3  Elevated LFTs:  Unclear etiology, suspect secondary to vaccination  -recommend checking right upper quadrant ultrasound, will repeat LFTs in several weeks prior to the next vaccine shot              _____________________________________________________________        CC:  Epigastric abdominal pain    HPI:  Cynthia Mathews is a 36 y o female who is here for epigastric pain  As you know this is a 78-year-old female with ileal Crohn's disease, had been on Imuran for many years, we tapered her off to mesalamine agent alone due to no progression of disease  Patient had been doing relatively well, recently reported since December having epigastric pain  She notes that she has had similar pain in the past about 10 years ago which responded to PPI therapy  Patient had taking over-the-counter PPI with improvement in symptoms, after new year's Myrtle drinking moderate amount and eating out she had worsening symptoms  Now presents for follow-up  She reports that since doubling up on PPI therapy 20 mg b i d  Over-the-counter she has had improvement in symptoms  She denies any nausea, vomiting, dysphagia  Denies any change in bowel movements  Recently she has had mild loose stools but denies any melena or rectal bleeding  Her Crohn symptoms are much improved  No weight has been stable  She recently had COVID-19 vaccination, most recent laboratory studies demonstrated mild elevation of CRP, ALT, AST and alkaline phosphatase  ROS:  The remainder of the ROS was negative except for the pertinent positives mentioned in HPI  Allergies: Patient has no known allergies      Medications:   Current Outpatient Medications:     hydrocortisone (ANUSOL-HC) 2 5 % rectal cream, Apply topically 2 (two) times a day Apply topically 2 times daily for 7-10 days, Disp: 28 g, Rfl: 0    hydrocortisone (ANUSOL-HC) 25 mg suppository, Insert 1 suppository (25 mg total) into the rectum 2 (two) times a day, Disp: 12 suppository, Rfl: 3    mesalamine (ASACOL) 800 MG EC tablet, Take 2 tablets (1,600 mg total) by mouth 2 (two) times a day, Disp: 120 tablet, Rfl: 3    Multiple Vitamins-Minerals (MULTIVITAMIN ADULT PO), Daily, Disp: , Rfl:     omeprazole (PriLOSEC) 20 mg delayed release capsule, Take 20 mg by mouth 2 (two) times a day, Disp: , Rfl:     Probiotic Product (PROBIOTIC-10 PO), Probiotic  1 tab daily, Disp: , Rfl:     Tranexamic Acid 650 MG TABS, Take 2 tablets (1,300 mg total) by mouth 3 (three) times a day, Disp: 5 tablet, Rfl: 4    ciprofloxacin-dexamethasone (CIPRODEX) otic suspension, Administer 4 drops to the right ear 2 (two) times a day (Patient not taking: Reported on 1/15/2021), Disp: 7 5 mL, Rfl: 0    mesalamine (PENTASA) 500 mg CR capsule, Take 2 capsules (1,000 mg total) by mouth 3 (three) times a day, Disp: 180 capsule, Rfl: 3    pantoprazole (PROTONIX) 40 mg tablet, Take 1 tablet (40 mg total) by mouth daily, Disp: 30 tablet, Rfl: 3    Past Medical History:   Diagnosis Date    Anxiety     [facility] [address] [facility]     [facility]     Ear infection        Past Surgical History:   Procedure Laterality Date     SECTION      COLONOSCOPY      UPPER GASTROINTESTINAL ENDOSCOPY         Family History   Problem Relation Age of Onset    Obesity Mother     Hypertension Mother     Obesity Father     Hypertension Father     Colon cancer Neg Hx     Liver disease Neg Hx         reports that she has never smoked  She has never used smokeless tobacco  She reports current alcohol use of about 2 0 standard drinks of alcohol per week  She reports that she does not use drugs        Physical Exam:    /58 (BP Location: Left arm, Patient Position: Sitting, Cuff Size: Standard)   Ht 4' 11" (1 499 m)   Wt 49 9 kg (110 lb)   BMI 22 22 kg/m²     Gen: wn/wd, NAD, healthy-appearing female  HEENT: anicteric, MMM, no cervical LAD  CVS: RRR, no m/r/g  CHEST: CTA b/l  ABD: +BS, soft, mild epigastric discomfort with deep palpation, no hepatosplenomegaly  EXT: no c/c/e  NEURO: aaox3  SKIN: NO rashes

## 2021-01-15 NOTE — PATIENT INSTRUCTIONS
Take pantoprazole 40mg in the am, if having symptoms okay to use famotidine (pepcid) take 40mg in the evening  If not better in 2-3 weeks call us  Get lab work before next vaccine

## 2021-01-26 ENCOUNTER — HOSPITAL ENCOUNTER (OUTPATIENT)
Dept: ULTRASOUND IMAGING | Facility: HOSPITAL | Age: 41
Discharge: HOME | End: 2021-01-26
Attending: INTERNAL MEDICINE
Payer: COMMERCIAL

## 2021-01-26 DIAGNOSIS — R10.13 EPIGASTRIC PAIN: ICD-10-CM

## 2021-01-26 PROCEDURE — 76705 ECHO EXAM OF ABDOMEN: CPT

## 2021-01-31 ENCOUNTER — APPOINTMENT (OUTPATIENT)
Dept: LAB | Facility: CLINIC | Age: 41
End: 2021-01-31
Payer: COMMERCIAL

## 2021-01-31 ENCOUNTER — HOSPITAL ENCOUNTER (EMERGENCY)
Facility: HOSPITAL | Age: 41
Discharge: HOME | End: 2021-01-31
Attending: EMERGENCY MEDICINE
Payer: COMMERCIAL

## 2021-01-31 ENCOUNTER — APPOINTMENT (EMERGENCY)
Dept: CT IMAGING | Facility: HOSPITAL | Age: 41
End: 2021-01-31
Payer: COMMERCIAL

## 2021-01-31 VITALS
SYSTOLIC BLOOD PRESSURE: 120 MMHG | TEMPERATURE: 98.4 F | OXYGEN SATURATION: 100 % | HEART RATE: 90 BPM | RESPIRATION RATE: 18 BRPM | DIASTOLIC BLOOD PRESSURE: 65 MMHG

## 2021-01-31 DIAGNOSIS — K50.00 CROHN'S DISEASE INVOLVING TERMINAL ILEUM (HCC): ICD-10-CM

## 2021-01-31 DIAGNOSIS — R10.13 EPIGASTRIC PAIN: ICD-10-CM

## 2021-01-31 DIAGNOSIS — K50.00 CROHN'S DISEASE INVOLVING TERMINAL ILEUM (HCC): Primary | ICD-10-CM

## 2021-01-31 DIAGNOSIS — K50.00 CROHN'S DISEASE OF SMALL INTESTINE WITHOUT COMPLICATION (HCC): Primary | ICD-10-CM

## 2021-01-31 LAB
ALBUMIN SERPL BCP-MCNC: 3.7 G/DL (ref 3.5–5)
ALP SERPL-CCNC: 122 U/L (ref 46–116)
ALT SERPL W P-5'-P-CCNC: 48 U/L (ref 12–78)
ANION GAP SERPL CALCULATED.3IONS-SCNC: 8 MMOL/L (ref 4–13)
AST SERPL W P-5'-P-CCNC: 23 U/L (ref 5–45)
BACTERIA UR QL AUTO: ABNORMAL /HPF
BASOPHILS # BLD MANUAL: 0 THOUSAND/UL (ref 0–0.1)
BASOPHILS NFR MAR MANUAL: 0 % (ref 0–1)
BILIRUB DIRECT SERPL-MCNC: 0.2 MG/DL (ref 0–0.2)
BILIRUB SERPL-MCNC: 0.82 MG/DL (ref 0.2–1)
BILIRUB UR QL STRIP: NEGATIVE
BUN SERPL-MCNC: 8 MG/DL (ref 5–25)
CALCIUM SERPL-MCNC: 9.1 MG/DL (ref 8.3–10.1)
CHLORIDE SERPL-SCNC: 103 MMOL/L (ref 100–108)
CLARITY UR: CLEAR
CO2 SERPL-SCNC: 27 MMOL/L (ref 21–32)
COLOR UR: YELLOW
CREAT SERPL-MCNC: 0.66 MG/DL (ref 0.6–1.3)
CRP SERPL QL: 39 MG/L
EOSINOPHIL # BLD MANUAL: 0.26 THOUSAND/UL (ref 0–0.4)
EOSINOPHIL NFR BLD MANUAL: 3 % (ref 0–6)
ERYTHROCYTE [DISTWIDTH] IN BLOOD BY AUTOMATED COUNT: 11.9 % (ref 11.6–15.1)
GFR SERPL CREATININE-BSD FRML MDRD: 111 ML/MIN/1.73SQ M
GLUCOSE SERPL-MCNC: 106 MG/DL (ref 65–140)
GLUCOSE UR STRIP-MCNC: NEGATIVE MG/DL
HCT VFR BLD AUTO: 38.2 % (ref 34.8–46.1)
HGB BLD-MCNC: 12.3 G/DL (ref 11.5–15.4)
HGB UR QL STRIP.AUTO: ABNORMAL
KETONES UR STRIP-MCNC: NEGATIVE MG/DL
LEUKOCYTE ESTERASE UR QL STRIP: NEGATIVE
LIPASE SERPL-CCNC: 150 U/L (ref 73–393)
LYMPHOCYTES # BLD AUTO: 1.12 THOUSAND/UL (ref 0.6–4.47)
LYMPHOCYTES # BLD AUTO: 13 % (ref 14–44)
MCH RBC QN AUTO: 28.8 PG (ref 26.8–34.3)
MCHC RBC AUTO-ENTMCNC: 32.2 G/DL (ref 31.4–37.4)
MCV RBC AUTO: 90 FL (ref 82–98)
MONOCYTES # BLD AUTO: 0.95 THOUSAND/UL (ref 0–1.22)
MONOCYTES NFR BLD: 11 % (ref 4–12)
NEUTROPHILS # BLD MANUAL: 6.3 THOUSAND/UL (ref 1.85–7.62)
NEUTS BAND NFR BLD MANUAL: 12 % (ref 0–8)
NEUTS SEG NFR BLD AUTO: 61 % (ref 43–75)
NITRITE UR QL STRIP: NEGATIVE
NON-SQ EPI CELLS URNS QL MICRO: ABNORMAL /HPF
NRBC BLD AUTO-RTO: 0 /100 WBCS
PH UR STRIP.AUTO: 5.5 [PH] (ref 4.5–8)
PLATELET # BLD AUTO: 340 THOUSANDS/UL (ref 149–390)
PLATELET BLD QL SMEAR: ADEQUATE
PMV BLD AUTO: 10.3 FL (ref 8.9–12.7)
POTASSIUM SERPL-SCNC: 3.5 MMOL/L (ref 3.5–5.3)
PROT SERPL-MCNC: 8.3 G/DL (ref 6.4–8.2)
PROT UR STRIP-MCNC: NEGATIVE MG/DL
RBC # BLD AUTO: 4.27 MILLION/UL (ref 3.81–5.12)
RBC #/AREA URNS AUTO: ABNORMAL /HPF
RBC MORPH BLD: NORMAL
SODIUM SERPL-SCNC: 138 MMOL/L (ref 136–145)
SP GR UR STRIP.AUTO: 1.01 (ref 1–1.03)
TOTAL CELLS COUNTED SPEC: 100
UROBILINOGEN UR QL STRIP.AUTO: 0.2 E.U./DL
WBC # BLD AUTO: 8.63 THOUSAND/UL (ref 4.31–10.16)
WBC #/AREA URNS AUTO: ABNORMAL /HPF

## 2021-01-31 PROCEDURE — 99285 EMERGENCY DEPT VISIT HI MDM: CPT | Performed by: EMERGENCY MEDICINE

## 2021-01-31 PROCEDURE — 99284 EMERGENCY DEPT VISIT MOD MDM: CPT

## 2021-01-31 PROCEDURE — 96365 THER/PROPH/DIAG IV INF INIT: CPT

## 2021-01-31 PROCEDURE — 80048 BASIC METABOLIC PNL TOTAL CA: CPT

## 2021-01-31 PROCEDURE — 85027 COMPLETE CBC AUTOMATED: CPT

## 2021-01-31 PROCEDURE — 96375 TX/PRO/DX INJ NEW DRUG ADDON: CPT

## 2021-01-31 PROCEDURE — 81001 URINALYSIS AUTO W/SCOPE: CPT

## 2021-01-31 PROCEDURE — 80076 HEPATIC FUNCTION PANEL: CPT

## 2021-01-31 PROCEDURE — 83690 ASSAY OF LIPASE: CPT

## 2021-01-31 PROCEDURE — 74177 CT ABD & PELVIS W/CONTRAST: CPT

## 2021-01-31 PROCEDURE — 86140 C-REACTIVE PROTEIN: CPT

## 2021-01-31 PROCEDURE — 85007 BL SMEAR W/DIFF WBC COUNT: CPT

## 2021-01-31 PROCEDURE — 36415 COLL VENOUS BLD VENIPUNCTURE: CPT

## 2021-01-31 RX ORDER — PREDNISONE 20 MG/1
40 TABLET ORAL ONCE
Status: COMPLETED | OUTPATIENT
Start: 2021-01-31 | End: 2021-01-31

## 2021-01-31 RX ORDER — DICYCLOMINE HYDROCHLORIDE 10 MG/1
10 CAPSULE ORAL 3 TIMES DAILY PRN
Qty: 90 CAPSULE | Refills: 3 | Status: SHIPPED | OUTPATIENT
Start: 2021-01-31 | End: 2022-08-03

## 2021-01-31 RX ORDER — OXYCODONE HYDROCHLORIDE 5 MG/1
5 CAPSULE ORAL EVERY 6 HOURS PRN
Qty: 10 CAPSULE | Refills: 0 | Status: SHIPPED | OUTPATIENT
Start: 2021-01-31 | End: 2022-08-03

## 2021-01-31 RX ORDER — SUCRALFATE 1 G/1
1 TABLET ORAL 4 TIMES DAILY
Qty: 90 TABLET | Refills: 1 | Status: SHIPPED | OUTPATIENT
Start: 2021-01-31 | End: 2021-04-14

## 2021-01-31 RX ORDER — PREDNISONE 20 MG/1
40 TABLET ORAL DAILY
Qty: 26 TABLET | Refills: 0 | Status: SHIPPED | OUTPATIENT
Start: 2021-01-31 | End: 2021-02-26

## 2021-01-31 RX ORDER — MORPHINE SULFATE 4 MG/ML
4 INJECTION, SOLUTION INTRAMUSCULAR; INTRAVENOUS ONCE
Status: COMPLETED | OUTPATIENT
Start: 2021-01-31 | End: 2021-01-31

## 2021-01-31 RX ADMIN — PREDNISONE 40 MG: 20 TABLET ORAL at 20:33

## 2021-01-31 RX ADMIN — MORPHINE SULFATE 4 MG: 4 INJECTION INTRAVENOUS at 18:31

## 2021-01-31 RX ADMIN — IOHEXOL 100 ML: 350 INJECTION, SOLUTION INTRAVENOUS at 18:39

## 2021-01-31 RX ADMIN — SODIUM CHLORIDE, SODIUM LACTATE, POTASSIUM CHLORIDE, AND CALCIUM CHLORIDE 500 ML: .6; .31; .03; .02 INJECTION, SOLUTION INTRAVENOUS at 18:07

## 2021-01-31 NOTE — Clinical Note
Peter Gomez was seen and treated in our emergency department on 1/31/2021  Diagnosis:     Jocy    She may return on this date: 02/02/2021         If you have any questions or concerns, please don't hesitate to call        Monalisa Alvarenga MD    ______________________________           _______________          _______________  Hospital Representative                              Date                                Time

## 2021-02-01 NOTE — DISCHARGE INSTRUCTIONS
Diagnosis; crohn's disease       - continue  with current medications    - starting tomorrow- prednisone - 2 tablets once a day for the next 13 days-- please call Erika mohamud this upcoming week to touch base about symptoms     - for severe pain on - on an as needed basis- oxycodone - do not take on a regular scheduled basis    - please return to  the er for any fevers- temp > 100 4/ any worsening/ intractable abdominal pain/ any persistent vomiting/ any profusely bloody stools

## 2021-02-02 NOTE — ED PROVIDER NOTES
History  Chief Complaint   Patient presents with    Abdominal Pain     pt with abdominal pain on/off for 2 months, states had blood work today, and was told by Dr Sunny Mclain to come in for elevated CRP level, states had normal ultrasound done      36 yr female  With crohns dx -- on  Asacol/bentyl/ protonic- seen by local gi -- c/o 2 months of intermitent -- but worsening mid to upper abd pain -- no gerd/dyspepsia/ melena/progressive dysphagia-- had recent ruq u/s and labs- todl to coem to  The er for elevated crp/bands- pt did recently get first covid vaccine dose- no fevers- no n/v- no gu/Gynecology comps       History provided by:  Patient   used: No    Abdominal Pain  Pain location:  Epigastric  Pain quality: aching    Pain radiates to:  Does not radiate  Onset quality:  Gradual  Duration:  8 weeks  Timing:  Intermittent  Associated symptoms: no constipation, no diarrhea, no nausea and no vomiting        Prior to Admission Medications   Prescriptions Last Dose Informant Patient Reported? Taking?    Multiple Vitamins-Minerals (MULTIVITAMIN ADULT PO)  Self Yes Yes   Sig: Daily   Probiotic Product (PROBIOTIC-10 PO)  Self Yes Yes   Sig: Probiotic   1 tab daily   Tranexamic Acid 650 MG TABS  Self No Yes   Sig: Take 2 tablets (1,300 mg total) by mouth 3 (three) times a day   ciprofloxacin-dexamethasone (CIPRODEX) otic suspension Not Taking at Unknown time Self No No   Sig: Administer 4 drops to the right ear 2 (two) times a day   Patient not taking: Reported on 1/15/2021   dicyclomine (BENTYL) 10 mg capsule   No Yes   Sig: Take 1 capsule (10 mg total) by mouth 3 (three) times a day as needed (abd pain)   hydrocortisone (ANUSOL-HC) 2 5 % rectal cream Not Taking at Unknown time Self No No   Sig: Apply topically 2 (two) times a day Apply topically 2 times daily for 7-10 days   Patient not taking: Reported on 1/31/2021   hydrocortisone (ANUSOL-HC) 25 mg suppository  Self No Yes   Sig: Insert 1 suppository (25 mg total) into the rectum 2 (two) times a day   mesalamine (ASACOL) 800 MG EC tablet  Self No Yes   Sig: Take 2 tablets (1,600 mg total) by mouth 2 (two) times a day   mesalamine (PENTASA) 500 mg CR capsule   No Yes   Sig: Take 2 capsules (1,000 mg total) by mouth 3 (three) times a day   omeprazole (PriLOSEC) 20 mg delayed release capsule  Self Yes Yes   Sig: Take 20 mg by mouth 2 (two) times a day   pantoprazole (PROTONIX) 40 mg tablet   No Yes   Sig: Take 1 tablet (40 mg total) by mouth daily   sucralfate (CARAFATE) 1 g tablet   No Yes   Sig: Take 1 tablet (1 g total) by mouth 4 (four) times a day      Facility-Administered Medications: None       Past Medical History:   Diagnosis Date    Anxiety     [facility]immune disorder [facility] )     Crohn disease ([facility] )     Ear infection        Past Surgical History:   Procedure Laterality Date     SECTION      COLONOSCOPY      UPPER GASTROINTESTINAL ENDOSCOPY         Family History   Problem Relation Age of Onset    Obesity Mother     Hypertension Mother     Obesity Father     Hypertension Father     Colon cancer Neg Hx     Liver disease Neg Hx      I have reviewed and agree with the history as documented  E-Cigarette/Vaping    E-Cigarette Use Never User      E-Cigarette/Vaping Substances    Nicotine No     THC No     CBD No     Flavoring No     Other No     Unknown No      Social History     Tobacco Use    Smoking status: Never Smoker    Smokeless tobacco: Never Used   Substance Use Topics    Alcohol use: Yes     Alcohol/week: 2 0 standard drinks     Types: 2 Standard drinks or equivalent per week     Frequency: 2-4 times a month     Drinks per session: 1 or 2    Drug use: No       Review of Systems   Constitutional: Negative  HENT: Negative  Eyes: Negative  Respiratory: Negative  Cardiovascular: Negative  Gastrointestinal: Positive for abdominal pain   Negative for abdominal distention, anal bleeding, blood in stool, constipation, diarrhea, nausea, rectal pain and vomiting  Mid/upper abd pain - loose stool today    Endocrine: Negative  Genitourinary: Negative  Musculoskeletal: Negative  Skin: Negative  Allergic/Immunologic: Negative  Neurological: Negative  Hematological: Negative  Psychiatric/Behavioral: Negative  Physical Exam  Physical Exam  Vitals signs and nursing note reviewed  Constitutional:       Appearance: She is well-developed  Comments: avss-- pulse ox 100 % on ra- interpretation is normal- no intervention - in nad    HENT:      Head: Normocephalic and atraumatic  Mouth/Throat:      Mouth: Mucous membranes are moist    Eyes:      General: No scleral icterus  Extraocular Movements: Extraocular movements intact  Pupils: Pupils are equal, round, and reactive to light  Comments: Mm pink   Cardiovascular:      Rate and Rhythm: Normal rate and regular rhythm  Heart sounds: Normal heart sounds  No murmur  No friction rub  No gallop  Pulmonary:      Effort: Pulmonary effort is normal  No respiratory distress  Breath sounds: Normal breath sounds  No stridor  No wheezing, rhonchi or rales  Chest:      Chest wall: No tenderness  Abdominal:      General: Abdomen is flat  Bowel sounds are normal  There is no distension or abdominal bruit  There are no signs of injury  Palpations: Abdomen is soft  There is no shifting dullness, fluid wave, hepatomegaly, splenomegaly, mass or pulsatile mass  Tenderness: There is abdominal tenderness in the epigastric area  There is no right CVA tenderness, left CVA tenderness, guarding or rebound  Negative signs include Cabral's sign, Rovsing's sign, McBurney's sign, psoas sign and obturator sign  Hernia: No hernia is present  There is no hernia in the umbilical area, ventral area, left inguinal area, right femoral area, left femoral area or right inguinal area        Comments: Mild epigastric/ supraumbilical tenderness-  Rest of abd/soft nt- no hsm/ no cva tenderness- no peritoneal signs   Skin:     General: Skin is warm and dry  Capillary Refill: Capillary refill takes less than 2 seconds  Coloration: Skin is not cyanotic, jaundiced, mottled or pale  Findings: No erythema or rash  Neurological:      General: No focal deficit present  Mental Status: She is alert and oriented to person, place, and time  Cranial Nerves: No cranial nerve deficit  Motor: No weakness  Comments: Normal non focal neuro exam    Psychiatric:         Mood and Affect: Mood normal  Mood is not anxious or depressed           Behavior: Behavior normal          Vital Signs  ED Triage Vitals   Temperature Pulse Respirations Blood Pressure SpO2   01/31/21 1623 01/31/21 1622 01/31/21 1622 01/31/21 1622 01/31/21 1622   98 4 °F (36 9 °C) (!) 110 18 127/63 100 %      Temp Source Heart Rate Source Patient Position - Orthostatic VS BP Location FiO2 (%)   01/31/21 1623 01/31/21 1622 -- -- --   Temporal Monitor         Pain Score       01/31/21 1831       7           Vitals:    01/31/21 1622 01/31/21 2047   BP: 127/63 120/65   Pulse: (!) 110 90         Visual Acuity      ED Medications  Medications   lactated ringers bolus 500 mL (0 mL Intravenous Stopped 1/31/21 1900)   morphine (PF) 4 mg/mL injection 4 mg (4 mg Intravenous Given 1/31/21 1831)   iohexol (OMNIPAQUE) 350 MG/ML injection (MULTI-DOSE) 100 mL (100 mL Intravenous Given 1/31/21 1839)   predniSONE tablet 40 mg (40 mg Oral Given 1/31/21 2033)       Diagnostic Studies  Results Reviewed     Procedure Component Value Units Date/Time    Urine Microscopic [[id number]]  (Abnormal) Collected: 01/31/21 1643    Lab Status: Final result Specimen: Urine, Clean Catch Updated: 01/31/21 1759     RBC, UA 0-1 /hpf      WBC, UA 0-1 /hpf      Epithelial Cells Occasional /hpf      Bacteria, UA Moderate /hpf     Urine Macroscopic, POC [[id number]]  (Abnormal) Collected: 01/31/21 1643    Lab Status: Final result Specimen: Urine Updated: 01/31/21 1645     Color, UA Yellow     Clarity, UA Clear     pH, UA 5 5     Leukocytes, UA Negative     Nitrite, UA Negative     Protein, UA Negative mg/dl      Glucose, UA Negative mg/dl      Ketones, UA Negative mg/dl      Urobilinogen, UA 0 2 E U /dl      Bilirubin, UA Negative     Blood, UA Trace     Specific [place], UA 1 015    Narrative:      CLINITEK RESULT                 CT abdomen pelvis with contrast   Final Result by Nii Landeros MD (01/31 1947)      Mucosal hyperenhancement and mild bowel wall thickening involving several distal small bowel loops including the terminal ileum consistent with acute enteritis in this patient with known history of Crohn's disease  No evidence for bowel obstruction  No    evidence for intra-abdominal abscess  The study was marked in [facility] for immediate notification              Workstation performed: [id number]                    Procedures  Procedures         ED Course  ED Course as of Feb 02 0828   Zoraida Carlton Jan 31, 2021   1737 Er md note- 1/31/21 labs/ and recent ruq u/s report all reviewed by er md      1 Er md note- pt offered pain medication- refuses at present      1739 Er md note- dr Ward Martino - who is pt gi md is on call- he was tiger texted await call back       [phone] Vitaliy dinh note- case d/w- dr Ward Martino- go- would recommend ct scan of abd/pelvis- marylu l discuss with pt       1756 Er md note- Tamara mohamud rec reviewed with pt-- pt agrees to ct of abd/pelvis       [id number] - vitaliy dinh note- pt now asking for pain medication will order      1909 VITALIY DINH NOTE- PT- RE-EVALUATED- FEELS MUCH IMPROVED WITH ABD PAIN AFTER MORPHINE - WILL CONTINEU TO RE-EVAL       2015 Vitaliy dinh note- case d/w- Tamara mohamud- would recommend starting prednisone 40 mg  for next 2 weeks and he marylu l followup with trqper       2029 - er md not-e this discussed with pt- who is ok with tx plan/ dispo                                SBIRT 22yo+      Most Recent Value   SBIRT (22 yo +)   In order to provide better care to our patients, we are screening all of our patients for alcohol and drug use  Would it be okay to ask you these screening questions? Yes Filed at: 01/31/2021 1641   Initial Alcohol Screen: US AUDIT-C    1  How often do you have a drink containing alcohol? 1 Filed at: 01/31/2021 1641   2  How many drinks containing alcohol do you have on a typical day you are drinking? 1 Filed at: 01/31/2021 1641   3a  Male UNDER 65: How often do you have five or more drinks on one occasion? 0 Filed at: 01/31/2021 1641   3b  FEMALE Any Age, or MALE 65+: How often do you have 4 or more drinks on one occassion? 0 Filed at: 01/31/2021 1641   Audit-C Score  2 Filed at: 01/31/2021 1641   DELBERT: How many times in the past year have you    Used an illegal drug or used a prescription medication for non-medical reasons? Never Filed at: 01/31/2021 1641                    MDM    Disposition  Final diagnoses:   Crohn's disease of small intestine without complication ([address] )     Time reflects when diagnosis was documented in both MDM as applicable and the Disposition within this note     Time User Action Codes Description Comment    1/31/2021  8:30 PM Praneeth LATHAM Add [K50 00] Crohn's disease of small intestine without complication [facility])       ED Disposition     ED Disposition Condition Date/Time Comment    Discharge Stable Sun Jan 31, 2021 2030 [address] discharge to home/self care              Follow-up Information    None         Discharge Medication List as of 1/31/2021  8:41 PM      START taking these medications    Details   oxyCODONE (OXY-IR) 5 MG capsule Take 1 capsule (5 mg total) by mouth every 6 (six) hours as needed for severe pain for up to 10 doses Can substitute oxycodone tablets for capsulesMax Daily Amount: 20 mg, Starting Sun 1/31/2021, Normal      predniSONE 20 mg tablet Take 2 tablets (40 mg total) by mouth daily for 26 doses, Starting Sun 1/31/2021, Until Fri 2/26/2021, Print         CONTINUE these medications which have NOT CHANGED    Details   dicyclomine (BENTYL) 10 mg capsule Take 1 capsule (10 mg total) by mouth 3 (three) times a day as needed (abd pain), Starting Sun 1/31/2021, Normal      hydrocortisone (ANUSOL-HC) 25 mg suppository Insert 1 suppository (25 mg total) into the rectum 2 (two) times a day, Starting Tue 9/29/2020, Normal      mesalamine (ASACOL) 800 MG EC tablet Take 2 tablets (1,600 mg total) by mouth 2 (two) times a day, Starting Tue 9/29/2020, Normal      mesalamine (PENTASA) 500 mg CR capsule Take 2 capsules (1,000 mg total) by mouth 3 (three) times a day, Starting Sun 7/5/2020, Until Sun 1/31/2021, Normal      Multiple Vitamins-Minerals (MULTIVITAMIN ADULT PO) Daily, Historical Med      omeprazole (PriLOSEC) 20 mg delayed release capsule Take 20 mg by mouth 2 (two) times a day, Historical Med      pantoprazole (PROTONIX) 40 mg tablet Take 1 tablet (40 mg total) by mouth daily, Starting Fri 1/15/2021, Normal      Probiotic Product (PROBIOTIC-10 PO) Probiotic   1 tab daily, Historical Med      sucralfate (CARAFATE) 1 g tablet Take 1 tablet (1 g total) by mouth 4 (four) times a day, Starting Sun 1/31/2021, Normal      Tranexamic Acid 650 MG TABS Take 2 tablets (1,300 mg total) by mouth 3 (three) times a day, Starting Tue 1/5/2021, Normal      ciprofloxacin-dexamethasone (CIPRODEX) otic suspension Administer 4 drops to the right ear 2 (two) times a day, Starting Wed 10/2/2019, Normal      hydrocortisone (ANUSOL-HC) 2 5 % rectal cream Apply topically 2 (two) times a day Apply topically 2 times daily for 7-10 days, Starting Mon 8/31/2020, Normal           No discharge procedures on file      PDMP Review     None          ED Provider  Electronically Signed by           Gato Vergara MD  02/02/21 0098

## 2021-02-04 ENCOUNTER — TELEPHONE (OUTPATIENT)
Dept: GASTROENTEROLOGY | Facility: CLINIC | Age: 41
End: 2021-02-04

## 2021-02-04 ENCOUNTER — IMMUNIZATIONS (OUTPATIENT)
Dept: FAMILY MEDICINE CLINIC | Facility: HOSPITAL | Age: 41
End: 2021-02-04

## 2021-02-04 DIAGNOSIS — Z23 ENCOUNTER FOR IMMUNIZATION: Primary | ICD-10-CM

## 2021-02-04 PROCEDURE — 0012A SARS-COV-2 / COVID-19 MRNA VACCINE (MODERNA) 100 MCG: CPT

## 2021-02-04 PROCEDURE — 91301 SARS-COV-2 / COVID-19 MRNA VACCINE (MODERNA) 100 MCG: CPT

## 2021-02-04 NOTE — TELEPHONE ENCOUNTER
Hello I called pt to go over results, pt would like f/u appt with in next 2 weeks, can you please help heri I do not have over booking power, Thank You

## 2021-02-10 ENCOUNTER — OFFICE VISIT (OUTPATIENT)
Dept: GASTROENTEROLOGY | Facility: AMBULARY SURGERY CENTER | Age: 41
End: 2021-02-10
Payer: COMMERCIAL

## 2021-02-10 VITALS
SYSTOLIC BLOOD PRESSURE: 92 MMHG | WEIGHT: 108.4 LBS | DIASTOLIC BLOOD PRESSURE: 62 MMHG | BODY MASS INDEX: 21.85 KG/M2 | HEIGHT: 59 IN

## 2021-02-10 DIAGNOSIS — K50.00 CROHN'S DISEASE INVOLVING TERMINAL ILEUM (HCC): Primary | ICD-10-CM

## 2021-02-10 PROCEDURE — 99214 OFFICE O/P EST MOD 30 MIN: CPT | Performed by: INTERNAL MEDICINE

## 2021-02-10 RX ORDER — AZATHIOPRINE 50 MG/1
50 TABLET ORAL DAILY
Qty: 30 TABLET | Refills: 3 | Status: SHIPPED | OUTPATIENT
Start: 2021-02-10 | End: 2021-04-14 | Stop reason: SDUPTHER

## 2021-02-10 RX ORDER — PREDNISONE 10 MG/1
10 TABLET ORAL DAILY
Qty: 100 TABLET | Refills: 1 | Status: SHIPPED | OUTPATIENT
Start: 2021-02-10 | End: 2021-04-14

## 2021-02-10 NOTE — PROGRESS NOTES
[facility]  Jayden Montano 36 y o  female MRN: [id number]            Assessment & Plan:     70-year-old female, initially diagnosed with ileal Crohn's disease approximately 4 years ago, had been started on azathioprine at that time and has been maintained on that as she had been doing well in clinical and endoscopic remission  We stopped her azathioprine recently, kept her on mesalamine agents and she had a flare-up with severe small-bowel inflammatory changes and abdominal pain  1  Small bowel Crohn's disease:  - discussed with the patient treatment options including resuming azathioprine, biologic agents including infliximab, adalimumab, vedolizumab  - discussed small risks of lymphoma with azathioprine, hematologic dyscrasias, pancreatitis, drug-induced hepatitis, as well as small risk of increase in malignancy with biologics, immunosuppressive effects of all the medications   - after extensive discussion, the patient 1st resume azathioprine as she had been doing quite well on this for several years  - will resume azathioprine at 50 mg daily which was her prior dose, we will check laboratory studies including BMP, LFTs, lipase, CBC 1 week after starting, then every 2 weeks x3 then every month x3  - we will check QuantiFERON, hepatitis serologies in prep occasions   - we will check fecal calprotectin   - we will check MR enterography in the future to evaluate for small bowel healing she may need to have a colonoscopy in the future as well  -continue dietary modification as tolerated              _____________________________________________________________        CC: Follow-up    HPI:  Jayden Montano is a 36 y  o female who is here for  Follow-up  As you know this is a 70-year-old female with a history of ileal Crohn's disease, had come to me several years ago already on Imuran at that time and was doing very well    She was clinically in remission and endoscopically in remission as well   Her last colonoscopy in January 2020 revealed no active disease, there was minimal ileal erythematous changes  Biopsies were normal   Her MR enterography and laboratory studies were all normal   Given her years of Imuran we decided stop the azathioprine, she started to have epigastric abdominal pain in the fall to early winter of this year, initially thought to be due to gastritis and peptic ulcer disease  She then had severe abdominal pain, presented to the emergency room which she had multiple areas of small bowel inflammatory changes consistent with small bowel Crohn's disease with elevated CRP  She was placed on prednisone and has had significant improvement in her symptoms  She is currently on 40 mg of prednisone, experience some mild side effects including some mild tremulousness, some difficulty with his mood and sleep  Appetite is good  She denies a ocular symptoms, denies any arthralgias  Her weight has been stable  ROS:  The remainder of the ROS was negative except for the pertinent positives mentioned in HPI  Allergies: Patient has no known allergies      Medications:   Current Outpatient Medications:     dicyclomine (BENTYL) 10 mg capsule, Take 1 capsule (10 mg total) by mouth 3 (three) times a day as needed (abd pain), Disp: 90 capsule, Rfl: 3    hydrocortisone (ANUSOL-HC) 25 mg suppository, Insert 1 suppository (25 mg total) into the rectum 2 (two) times a day, Disp: 12 suppository, Rfl: 3    mesalamine (ASACOL) 800 MG EC tablet, Take 2 tablets (1,600 mg total) by mouth 2 (two) times a day, Disp: 120 tablet, Rfl: 3    Multiple Vitamins-Minerals (MULTIVITAMIN ADULT PO), Daily, Disp: , Rfl:     predniSONE 20 mg tablet, Take 2 tablets (40 mg total) by mouth daily for 26 doses, Disp: 26 tablet, Rfl: 0    Probiotic Product (PROBIOTIC-10 PO), Probiotic  1 tab daily, Disp: , Rfl:     Tranexamic Acid 650 MG TABS, Take 2 tablets (1,300 mg total) by mouth 3 (three) times a day, Disp: 5 tablet, Rfl: 4    azaTHIOprine (IMURAN) 50 mg tablet, Take 1 tablet (50 mg total) by mouth daily, Disp: 30 tablet, Rfl: 3    ciprofloxacin-dexamethasone (CIPRODEX) otic suspension, Administer 4 drops to the right ear 2 (two) times a day (Patient not taking: Reported on 1/15/2021), Disp: 7 5 mL, Rfl: 0    hydrocortisone (ANUSOL-HC) 2 5 % rectal cream, Apply topically 2 (two) times a day Apply topically 2 times daily for 7-10 days (Patient not taking: Reported on 2021), Disp: 28 g, Rfl: 0    mesalamine (PENTASA) 500 mg CR capsule, Take 2 capsules (1,000 mg total) by mouth 3 (three) times a day, Disp: 180 capsule, Rfl: 3    omeprazole (PriLOSEC) 20 mg delayed release capsule, Take 20 mg by mouth 2 (two) times a day, Disp: , Rfl:     oxyCODONE (OXY-IR) 5 MG capsule, Take 1 capsule (5 mg total) by mouth every 6 (six) hours as needed for severe pain for up to 10 doses Can substitute oxycodone tablets for capsulesMax Daily Amount: 20 mg (Patient not taking: Reported on 2/10/2021), Disp: 10 capsule, Rfl: 0    pantoprazole (PROTONIX) 40 mg tablet, Take 1 tablet (40 mg total) by mouth daily (Patient not taking: Reported on 2/10/2021), Disp: 30 tablet, Rfl: 3    predniSONE 10 mg tablet, Take 1 tablet (10 mg total) by mouth daily Take 40mg daily (split dose) then taper by 5mg weekly, Disp: 100 tablet, Rfl: 1    sucralfate (CARAFATE) 1 g tablet, Take 1 tablet (1 g total) by mouth 4 (four) times a day (Patient not taking: Reported on 2/10/2021), Disp: 90 tablet, Rfl: 1    Past Medical History:   Diagnosis Date    Anxiety     [facility]     [facility]     Ear infection        Past Surgical History:   Procedure Laterality Date     SECTION      COLONOSCOPY      UPPER GASTROINTESTINAL ENDOSCOPY         Family History   Problem Relation Age of Onset    Obesity Mother     Hypertension Mother     Obesity Father     Hypertension Father     Colon cancer Neg Hx     Liver disease Neg Hx         reports that she has never smoked  She has never used smokeless tobacco  She reports current alcohol use of about 2 0 standard drinks of alcohol per week  She reports that she does not use drugs        Physical Exam:    BP 92/62   Ht 4' 11" (1 499 m)   Wt 49 2 kg (108 lb 6 4 oz)   BMI 21 89 kg/m²     Gen: wn/wd, NAD, healthy-appearing female  HEENT: anicteric, MMM, no cervical LAD  CVS: RRR, no m/r/g  CHEST: CTA b/l  ABD: +BS, soft, NT,ND, no hepatosplenomegaly  EXT: no c/c/e  NEURO: aaox3  SKIN: NO rashes    Answers for HPI/ROS submitted by the patient on 2/10/2021   When you are not experiencing symptoms of your inflammatory bowel disease, how many bowel movements do you typically have each day?: 0-1  Over the last 3 days, what is the maximum number of bowel movements that you had in a single day?: 3  Over the last 3 days, have you had any bowel movements where you passed blood without stool?: No  Since your last visit, have you received any vaccinations?: Yes  Since the last visit, have you had an infection?: No  Is there any possibility that you may be pregnant?: No  In the past three months, have you used tobacco in any form?: No  During the last year, how many days have you missed work or school because of your inflammatory bowel disease?: 2  During the last year, how many days have you been hospitalized because of your inflammatory bowel disease?: 0  During the last year, how many days have you visited a hospital emergency department because of your inflammatory bowel disease?: 1  During the last month, have you taken narcotic pain medications (such as Percocet, oxycodone, Oxycontin, morphine, Vicodin, Dilaudid, MS Contin) for your inflammatory bowel disease?: Yes  During the last month, have you awoken at night to move your bowels?: No  During the last month, have you had leakage of your stool while sleeping?: No  During the last month, have you had incontinence of stool while you were awake?: No  During the last month have you unintentionally lost weight?: No  During the last 3 days, have you had a fever?: No  During the last 3 days, have you had eye irritation?: No  During the last 3 days, have you had mouth sores?: No  During the last 3 days, have you had a sore throat?: No  During the last 3 days, have you had chest pain?: No  During the last 3 days, have you had shortness of breath?: No  During the last 3 days, have you had numbness or tingling in your hands or feet?: No  During the last 3 days, have you had a skin rash?: No  During the last 3 days, have you had pain or swelling in your joints?: No  During the last 3 days, have you had bruising or bleeding?: No  During the last 3 days, have you felt depressed or blue?: No

## 2021-02-10 NOTE — LETTER
February 10, 2021     Dany Chavez [phone]     Patient: Peter Gomez   YOB: 1980   Date of Visit: 2/10/2021       Dear Dr Flor Salgado: Thank you for referring Peter Gomez to me for evaluation  Below are my notes for this consultation  If you have questions, please do not hesitate to call me  I look forward to following your patient along with you  Sincerely,        Nereida Cogan, MD        CC: No Recipients  Nereida Cogan, MD  2/10/2021  9:22 AM  Sign when Signing Visit  [address] [facility]  Peter Gomez 36 y o  female MRN: [id number]            Assessment & Plan:     80-year-old female, initially diagnosed with ileal Crohn's disease approximately 4 years ago, had been started on azathioprine at that time and has been maintained on that as she had been doing well in clinical and endoscopic remission  We stopped her azathioprine recently, kept her on mesalamine agents and she had a flare-up with severe small-bowel inflammatory changes and abdominal pain        1  Small bowel Crohn's disease:  - discussed with the patient treatment options including resuming azathioprine, biologic agents including infliximab, adalimumab, vedolizumab  - discussed small risks of lymphoma with azathioprine, hematologic dyscrasias, pancreatitis, drug-induced hepatitis, as well as small risk of increase in malignancy with biologics, immunosuppressive effects of all the medications   - after extensive discussion, the patient 1st resume azathioprine as she had been doing quite well on this for several years  - will resume azathioprine at 50 mg daily which was her prior dose, we will check laboratory studies including BMP, LFTs, lipase, CBC 1 week after starting, then every 2 weeks x3 then every month x3  - we will check QuantiFERON, hepatitis serologies in prep occasions   - we will check fecal calprotectin   - we will check MR enterography in the future to evaluate for small bowel healing she may need to have a colonoscopy in the future as well  -continue dietary modification as tolerated              _____________________________________________________________        CC: Follow-up    HPI:  Katrin Salguero is a 36 y  o female who is here for  Follow-up  As you know this is a 61-year-old female with a history of ileal Crohn's disease, had come to me several years ago already on Imuran at that time and was doing very well  She was clinically in remission and endoscopically in remission as well  Her last colonoscopy in January 2020 revealed no active disease, there was minimal ileal erythematous changes  Biopsies were normal   Her MR enterography and laboratory studies were all normal   Given her years of Imuran we decided stop the azathioprine, she started to have epigastric abdominal pain in the fall to early winter of this year, initially thought to be due to gastritis and peptic ulcer disease  She then had severe abdominal pain, presented to the emergency room which she had multiple areas of small bowel inflammatory changes consistent with small bowel Crohn's disease with elevated CRP  She was placed on prednisone and has had significant improvement in her symptoms  She is currently on 40 mg of prednisone, experience some mild side effects including some mild tremulousness, some difficulty with his mood and sleep  Appetite is good  She denies a ocular symptoms, denies any arthralgias  Her weight has been stable  ROS:  The remainder of the ROS was negative except for the pertinent positives mentioned in HPI  Allergies: Patient has no known allergies      Medications:   Current Outpatient Medications:     dicyclomine (BENTYL) 10 mg capsule, Take 1 capsule (10 mg total) by mouth 3 (three) times a day as needed (abd pain), Disp: 90 capsule, Rfl: 3    hydrocortisone (ANUSOL-HC) 25 mg suppository, Insert 1 suppository (25 mg total) into the rectum 2 (two) times a day, Disp: 12 suppository, Rfl: 3    mesalamine (ASACOL) 800 MG EC tablet, Take 2 tablets (1,600 mg total) by mouth 2 (two) times a day, Disp: 120 tablet, Rfl: 3    Multiple Vitamins-Minerals (MULTIVITAMIN ADULT PO), Daily, Disp: , Rfl:     predniSONE 20 mg tablet, Take 2 tablets (40 mg total) by mouth daily for 26 doses, Disp: 26 tablet, Rfl: 0    Probiotic Product (PROBIOTIC-10 PO), Probiotic  1 tab daily, Disp: , Rfl:     Tranexamic Acid 650 MG TABS, Take 2 tablets (1,300 mg total) by mouth 3 (three) times a day, Disp: 5 tablet, Rfl: 4    azaTHIOprine (IMURAN) 50 mg tablet, Take 1 tablet (50 mg total) by mouth daily, Disp: 30 tablet, Rfl: 3    ciprofloxacin-dexamethasone (CIPRODEX) otic suspension, Administer 4 drops to the right ear 2 (two) times a day (Patient not taking: Reported on 1/15/2021), Disp: 7 5 mL, Rfl: 0    hydrocortisone (ANUSOL-HC) 2 5 % rectal cream, Apply topically 2 (two) times a day Apply topically 2 times daily for 7-10 days (Patient not taking: Reported on 1/31/2021), Disp: 28 g, Rfl: 0    mesalamine (PENTASA) 500 mg CR capsule, Take 2 capsules (1,000 mg total) by mouth 3 (three) times a day, Disp: 180 capsule, Rfl: 3    omeprazole (PriLOSEC) 20 mg delayed release capsule, Take 20 mg by mouth 2 (two) times a day, Disp: , Rfl:     oxyCODONE (OXY-IR) 5 MG capsule, Take 1 capsule (5 mg total) by mouth every 6 (six) hours as needed for severe pain for up to 10 doses Can substitute oxycodone tablets for capsulesMax Daily Amount: 20 mg (Patient not taking: Reported on 2/10/2021), Disp: 10 capsule, Rfl: 0    pantoprazole (PROTONIX) 40 mg tablet, Take 1 tablet (40 mg total) by mouth daily (Patient not taking: Reported on 2/10/2021), Disp: 30 tablet, Rfl: 3    predniSONE 10 mg tablet, Take 1 tablet (10 mg total) by mouth daily Take 40mg daily (split dose) then taper by 5mg weekly, Disp: 100 tablet, Rfl: 1    sucralfate (CARAFATE) 1 g tablet, Take 1 tablet (1 g total) by mouth 4 (four) times a day (Patient not taking: Reported on 2/10/2021), Disp: 90 tablet, Rfl: 1    Past Medical History:   Diagnosis Date    Anxiety     [facility]immune disorder ([facility] )     Crohn disease ([facility] )     Ear infection        Past Surgical History:   Procedure Laterality Date     SECTION      COLONOSCOPY      UPPER GASTROINTESTINAL ENDOSCOPY         Family History   Problem Relation Age of Onset    Obesity Mother     Hypertension Mother     Obesity Father     Hypertension Father     Colon cancer Neg Hx     Liver disease Neg Hx         reports that she has never smoked  She has never used smokeless tobacco  She reports current alcohol use of about 2 0 standard drinks of alcohol per week  She reports that she does not use drugs        Physical Exam:    BP 92/62   Ht 4' 11" (1 499 m)   Wt 49 2 kg (108 lb 6 4 oz)   BMI 21 89 kg/m²     Gen: wn/wd, NAD, healthy-appearing female  HEENT: anicteric, MMM, no cervical LAD  CVS: RRR, no m/r/g  CHEST: CTA b/l  ABD: +BS, soft, NT,ND, no hepatosplenomegaly  EXT: no c/c/e  NEURO: aaox3  SKIN: NO rashes    Answers for HPI/ROS submitted by the patient on 2/10/2021   When you are not experiencing symptoms of your inflammatory bowel disease, how many bowel movements do you typically have each day?: 0-1  Over the last 3 days, what is the maximum number of bowel movements that you had in a single day?: 3  Over the last 3 days, have you had any bowel movements where you passed blood without stool?: No  Since your last visit, have you received any vaccinations?: Yes  Since the last visit, have you had an infection?: No  Is there any possibility that you may be pregnant?: No  In the past three months, have you used tobacco in any form?: No  During the last year, how many days have you missed work or school because of your inflammatory bowel disease?: 2  During the last year, how many days have you been hospitalized because of your inflammatory bowel disease?: 0  During the last year, how many days have you visited a hospital emergency department because of your inflammatory bowel disease?: 1  During the last month, have you taken narcotic pain medications (such as Percocet, oxycodone, Oxycontin, morphine, Vicodin, Dilaudid, MS Contin) for your inflammatory bowel disease?: Yes  During the last month, have you awoken at night to move your bowels?: No  During the last month, have you had leakage of your stool while sleeping?: No  During the last month, have you had incontinence of stool while you were awake?: No  During the last month have you unintentionally lost weight?: No  During the last 3 days, have you had a fever?: No  During the last 3 days, have you had eye irritation?: No  During the last 3 days, have you had mouth sores?: No  During the last 3 days, have you had a sore throat?: No  During the last 3 days, have you had chest pain?: No  During the last 3 days, have you had shortness of breath?: No  During the last 3 days, have you had numbness or tingling in your hands or feet?: No  During the last 3 days, have you had a skin rash?: No  During the last 3 days, have you had pain or swelling in your joints?: No  During the last 3 days, have you had bruising or bleeding?: No  During the last 3 days, have you felt depressed or blue?: No

## 2021-02-16 ENCOUNTER — LAB (OUTPATIENT)
Dept: LAB | Facility: HOSPITAL | Age: 41
End: 2021-02-16
Attending: INTERNAL MEDICINE
Payer: COMMERCIAL

## 2021-02-16 DIAGNOSIS — K50.00 CROHN'S DISEASE INVOLVING TERMINAL ILEUM (HCC): ICD-10-CM

## 2021-02-16 LAB
ALBUMIN SERPL BCP-MCNC: 3.3 G/DL (ref 3.5–5)
ALP SERPL-CCNC: 74 U/L (ref 46–116)
ALT SERPL W P-5'-P-CCNC: 23 U/L (ref 12–78)
ANION GAP SERPL CALCULATED.3IONS-SCNC: 9 MMOL/L (ref 4–13)
AST SERPL W P-5'-P-CCNC: 9 U/L (ref 5–45)
BASOPHILS # BLD AUTO: 0.02 THOUSANDS/ΜL (ref 0–0.1)
BASOPHILS NFR BLD AUTO: 0 % (ref 0–1)
BILIRUB DIRECT SERPL-MCNC: 0.12 MG/DL (ref 0–0.2)
BILIRUB SERPL-MCNC: 0.5 MG/DL (ref 0.2–1)
BUN SERPL-MCNC: 13 MG/DL (ref 5–25)
CALCIUM SERPL-MCNC: 9.2 MG/DL (ref 8.3–10.1)
CHLORIDE SERPL-SCNC: 104 MMOL/L (ref 100–108)
CO2 SERPL-SCNC: 29 MMOL/L (ref 21–32)
CREAT SERPL-MCNC: 0.67 MG/DL (ref 0.6–1.3)
EOSINOPHIL # BLD AUTO: 0.05 THOUSAND/ΜL (ref 0–0.61)
EOSINOPHIL NFR BLD AUTO: 0 % (ref 0–6)
ERYTHROCYTE [DISTWIDTH] IN BLOOD BY AUTOMATED COUNT: 11.9 % (ref 11.6–15.1)
GFR SERPL CREATININE-BSD FRML MDRD: 110 ML/MIN/1.73SQ M
GLUCOSE P FAST SERPL-MCNC: 87 MG/DL (ref 65–99)
HCT VFR BLD AUTO: 35.3 % (ref 34.8–46.1)
HGB BLD-MCNC: 11.2 G/DL (ref 11.5–15.4)
IMM GRANULOCYTES # BLD AUTO: 0.06 THOUSAND/UL (ref 0–0.2)
IMM GRANULOCYTES NFR BLD AUTO: 1 % (ref 0–2)
LIPASE SERPL-CCNC: 278 U/L (ref 73–393)
LYMPHOCYTES # BLD AUTO: 1.39 THOUSANDS/ΜL (ref 0.6–4.47)
LYMPHOCYTES NFR BLD AUTO: 11 % (ref 14–44)
MCH RBC QN AUTO: 28.4 PG (ref 26.8–34.3)
MCHC RBC AUTO-ENTMCNC: 31.7 G/DL (ref 31.4–37.4)
MCV RBC AUTO: 89 FL (ref 82–98)
MONOCYTES # BLD AUTO: 0.49 THOUSAND/ΜL (ref 0.17–1.22)
MONOCYTES NFR BLD AUTO: 4 % (ref 4–12)
NEUTROPHILS # BLD AUTO: 11.17 THOUSANDS/ΜL (ref 1.85–7.62)
NEUTS SEG NFR BLD AUTO: 84 % (ref 43–75)
NRBC BLD AUTO-RTO: 0 /100 WBCS
PLATELET # BLD AUTO: 337 THOUSANDS/UL (ref 149–390)
PMV BLD AUTO: 9.1 FL (ref 8.9–12.7)
POTASSIUM SERPL-SCNC: 3.4 MMOL/L (ref 3.5–5.3)
PROT SERPL-MCNC: 7 G/DL (ref 6.4–8.2)
RBC # BLD AUTO: 3.95 MILLION/UL (ref 3.81–5.12)
SODIUM SERPL-SCNC: 142 MMOL/L (ref 136–145)
WBC # BLD AUTO: 13.18 THOUSAND/UL (ref 4.31–10.16)

## 2021-02-16 PROCEDURE — 80076 HEPATIC FUNCTION PANEL: CPT | Performed by: INTERNAL MEDICINE

## 2021-02-16 PROCEDURE — 85025 COMPLETE CBC W/AUTO DIFF WBC: CPT | Performed by: INTERNAL MEDICINE

## 2021-02-16 PROCEDURE — 87340 HEPATITIS B SURFACE AG IA: CPT

## 2021-02-16 PROCEDURE — 86706 HEP B SURFACE ANTIBODY: CPT

## 2021-02-16 PROCEDURE — 83690 ASSAY OF LIPASE: CPT | Performed by: INTERNAL MEDICINE

## 2021-02-16 PROCEDURE — 86480 TB TEST CELL IMMUN MEASURE: CPT

## 2021-02-16 PROCEDURE — 80048 BASIC METABOLIC PNL TOTAL CA: CPT | Performed by: INTERNAL MEDICINE

## 2021-02-16 PROCEDURE — 82542 COL CHROMOTOGRAPHY QUAL/QUAN: CPT

## 2021-02-16 PROCEDURE — 36415 COLL VENOUS BLD VENIPUNCTURE: CPT | Performed by: INTERNAL MEDICINE

## 2021-02-17 LAB
HBV SURFACE AB SER-ACNC: 27.63 MIU/ML
HBV SURFACE AG SER QL: NORMAL

## 2021-02-19 LAB
GAMMA INTERFERON BACKGROUND BLD IA-ACNC: 0.03 IU/ML
M TB IFN-G BLD-IMP: ABNORMAL
M TB IFN-G CD4+ BCKGRND COR BLD-ACNC: 0.02 IU/ML
M TB IFN-G CD4+ BCKGRND COR BLD-ACNC: 0.04 IU/ML
MITOGEN IGNF BCKGRD COR BLD-ACNC: 0.41 IU/ML
REF LAB TEST METHOD: NORMAL
TEST INTERPRETATION: NORMAL
TPMT RBC-CCNC: 17.9 UNITS/ML RBC

## 2021-03-10 ENCOUNTER — APPOINTMENT (OUTPATIENT)
Dept: LAB | Facility: HOSPITAL | Age: 41
End: 2021-03-10
Attending: INTERNAL MEDICINE
Payer: COMMERCIAL

## 2021-04-14 ENCOUNTER — OFFICE VISIT (OUTPATIENT)
Dept: GASTROENTEROLOGY | Facility: AMBULARY SURGERY CENTER | Age: 41
End: 2021-04-14
Payer: COMMERCIAL

## 2021-04-14 VITALS
DIASTOLIC BLOOD PRESSURE: 82 MMHG | WEIGHT: 117.8 LBS | SYSTOLIC BLOOD PRESSURE: 120 MMHG | HEIGHT: 59 IN | BODY MASS INDEX: 23.75 KG/M2

## 2021-04-14 DIAGNOSIS — K50.00 CROHN'S DISEASE INVOLVING TERMINAL ILEUM (HCC): Primary | ICD-10-CM

## 2021-04-14 PROCEDURE — 99213 OFFICE O/P EST LOW 20 MIN: CPT | Performed by: INTERNAL MEDICINE

## 2021-04-14 RX ORDER — AZATHIOPRINE 50 MG/1
50 TABLET ORAL DAILY
Qty: 30 TABLET | Refills: 3 | Status: SHIPPED | OUTPATIENT
Start: 2021-04-14 | End: 2021-08-16 | Stop reason: SDUPTHER

## 2021-04-14 NOTE — PROGRESS NOTES
[facility]  Vandana Rodriguez 36 y o  female MRN: [id number]            Assessment & Plan:       IBD history:  55-year-old female, initially diagnosed with ileal Crohn's disease approximately 6 years ago, had been started on azathioprine at that time and has been maintained on that as she had been doing well in clinical and endoscopic remission  We stopped her azathioprine recently, kept her on mesalamine agents and she had a flare-up with severe small-bowel inflammatory changes and abdominal pain  Since that flare-up, patient has resumed Imuran, doing well on 50 mg daily  Tapered off prednisone  1  Ileal Crohn's disease:  History is noted above  -continue Imuran, 50 mg once daily   - patient is aware of small risk of hematologic dyscrasias, pancreatitis, hepatitis, lymphoma  -we will continue to monitor labs every 3 months   - we will check CRP, fecal calprotectin  -we will have the patient follow up in 6 months time, based on her clinical response at that time we can consider MR enterography or colonoscopy to evaluate for complete mucosal healing though clinically patient is doing quite well at this time    2  GERD: Continue pantoprazole daily, doing well, recommend EGD during future colonoscopy              _____________________________________________________________        CC: Follow-up    HPI:  Vandana Rodriguez is a 36 y  o female who is here for  Follow-up of ileal Crohn's disease  Patient completed her prednisone taper from a flare-up this past Saturday  She is back on her Imuran, 50 mg daily  She reports she is doing very well  Unfortunately she had retinal hemorrhage, requiring laser therapy, suspects  This was due to prednisone therapy  Overall patient is doing well  Her heartburn is well controlled on daily PPI therapy  Appetite is good, weight has been stable  Reports having regular, formed bowel movements, denies any melena, rectal bleeding, abdominal pain    No arthralgias, oral sores, ocular symptoms  Recent laboratory studies were stable  ROS:  The remainder of the ROS was negative except for the pertinent positives mentioned in HPI  Allergies: Patient has no known allergies      Medications:   Current Outpatient Medications:     azaTHIOprine (IMURAN) 50 mg tablet, Take 1 tablet (50 mg total) by mouth daily, Disp: 30 tablet, Rfl: 3    ciprofloxacin-dexamethasone (CIPRODEX) otic suspension, Administer 4 drops to the right ear 2 (two) times a day, Disp: 7 5 mL, Rfl: 0    dicyclomine (BENTYL) 10 mg capsule, Take 1 capsule (10 mg total) by mouth 3 (three) times a day as needed (abd pain), Disp: 90 capsule, Rfl: 3    hydrocortisone (ANUSOL-HC) 2 5 % rectal cream, Apply topically 2 (two) times a day Apply topically 2 times daily for 7-10 days, Disp: 28 g, Rfl: 0    hydrocortisone (ANUSOL-HC) 25 mg suppository, Insert 1 suppository (25 mg total) into the rectum 2 (two) times a day, Disp: 12 suppository, Rfl: 3    mesalamine (ASACOL) 800 MG EC tablet, Take 2 tablets (1,600 mg total) by mouth 2 (two) times a day, Disp: 120 tablet, Rfl: 3    Multiple Vitamins-Minerals (MULTIVITAMIN ADULT PO), Daily, Disp: , Rfl:     oxyCODONE (OXY-IR) 5 MG capsule, Take 1 capsule (5 mg total) by mouth every 6 (six) hours as needed for severe pain for up to 10 doses Can substitute oxycodone tablets for capsulesMax Daily Amount: 20 mg, Disp: 10 capsule, Rfl: 0    pantoprazole (PROTONIX) 40 mg tablet, Take 1 tablet (40 mg total) by mouth daily, Disp: 30 tablet, Rfl: 3    Probiotic Product (PROBIOTIC-10 PO), Probiotic  1 tab daily, Disp: , Rfl:     Tranexamic Acid 650 MG TABS, Take 2 tablets (1,300 mg total) by mouth 3 (three) times a day, Disp: 5 tablet, Rfl: 4    mesalamine (PENTASA) 500 mg CR capsule, Take 2 capsules (1,000 mg total) by mouth 3 (three) times a day, Disp: 180 capsule, Rfl: 3    Past Medical History:   Diagnosis Date    Anxiety     [facility]immune disorder ([facility] )     Crohn disease ([facility] )     Ear infection        Past Surgical History:   Procedure Laterality Date     SECTION      COLONOSCOPY      UPPER GASTROINTESTINAL ENDOSCOPY         Family History   Problem Relation Age of Onset    Obesity Mother     Hypertension Mother     Obesity Father     Hypertension Father     Colon cancer Neg Hx     Liver disease Neg Hx         reports that she has never smoked  She has never used smokeless tobacco  She reports current alcohol use of about 2 0 standard drinks of alcohol per week  She reports that she does not use drugs        Physical Exam:    /82 (BP Location: Left arm, Patient Position: Sitting, Cuff Size: Standard)   Ht 4' 11" (1 499 m)   Wt 53 4 kg (117 lb 12 8 oz)   BMI 23 79 kg/m²     Gen: wn/wd, NAD , healthy appearing female  HEENT: anicteric, MMM, no cervical LAD  CVS: RRR, no m/r/g  CHEST: CTA b/l  ABD: +BS, soft, NT,ND, no hepatosplenomegaly  EXT: no c/c/e  NEURO: aaox3  SKIN: NO rashes

## 2021-04-15 ENCOUNTER — APPOINTMENT (OUTPATIENT)
Dept: LAB | Facility: HOSPITAL | Age: 41
End: 2021-04-15
Attending: INTERNAL MEDICINE
Payer: COMMERCIAL

## 2021-04-15 DIAGNOSIS — K50.00 CROHN'S DISEASE INVOLVING TERMINAL ILEUM (HCC): ICD-10-CM

## 2021-04-15 DIAGNOSIS — R10.13 EPIGASTRIC PAIN: ICD-10-CM

## 2021-04-15 LAB — CRP SERPL QL: <3 MG/L

## 2021-04-15 PROCEDURE — 83993 ASSAY FOR CALPROTECTIN FECAL: CPT

## 2021-04-15 PROCEDURE — 86140 C-REACTIVE PROTEIN: CPT

## 2021-04-19 LAB — CALPROTECTIN STL-MCNT: 27 UG/G (ref 0–120)

## 2021-04-22 ENCOUNTER — TELEPHONE (OUTPATIENT)
Dept: GASTROENTEROLOGY | Facility: AMBULARY SURGERY CENTER | Age: 41
End: 2021-04-22

## 2021-04-22 DIAGNOSIS — R19.7 DIARRHEA, UNSPECIFIED TYPE: Primary | ICD-10-CM

## 2021-04-22 NOTE — TELEPHONE ENCOUNTER
Patient is aware c diff stool testing can not be done on formed stool  She will resubmit if able to collect loose/ liquid stool specimen

## 2021-04-23 ENCOUNTER — APPOINTMENT (OUTPATIENT)
Dept: LAB | Facility: HOSPITAL | Age: 41
End: 2021-04-23
Attending: INTERNAL MEDICINE
Payer: COMMERCIAL

## 2021-04-23 DIAGNOSIS — R19.7 DIARRHEA, UNSPECIFIED TYPE: ICD-10-CM

## 2021-04-23 PROCEDURE — 87493 C DIFF AMPLIFIED PROBE: CPT

## 2021-04-24 LAB — C DIFF TOX GENS STL QL NAA+PROBE: NEGATIVE

## 2021-04-30 ENCOUNTER — OFFICE VISIT (OUTPATIENT)
Dept: FAMILY MEDICINE CLINIC | Facility: CLINIC | Age: 41
End: 2021-04-30

## 2021-04-30 VITALS
WEIGHT: 116 LBS | HEIGHT: 59 IN | BODY MASS INDEX: 23.39 KG/M2 | DIASTOLIC BLOOD PRESSURE: 70 MMHG | RESPIRATION RATE: 14 BRPM | HEART RATE: 72 BPM | TEMPERATURE: 99.3 F | SYSTOLIC BLOOD PRESSURE: 104 MMHG

## 2021-04-30 DIAGNOSIS — M62.830 BACK MUSCLE SPASM: ICD-10-CM

## 2021-04-30 DIAGNOSIS — M54.50 ACUTE LEFT-SIDED LOW BACK PAIN WITHOUT SCIATICA: Primary | ICD-10-CM

## 2021-04-30 PROCEDURE — 99213 OFFICE O/P EST LOW 20 MIN: CPT | Performed by: FAMILY MEDICINE

## 2021-04-30 RX ORDER — KETOROLAC TROMETHAMINE 30 MG/ML
30 INJECTION, SOLUTION INTRAMUSCULAR; INTRAVENOUS ONCE
Status: COMPLETED | OUTPATIENT
Start: 2021-04-30 | End: 2021-04-30

## 2021-04-30 RX ORDER — CYCLOBENZAPRINE HCL 10 MG
10 TABLET ORAL 3 TIMES DAILY PRN
Qty: 21 TABLET | Refills: 0 | Status: SHIPPED | OUTPATIENT
Start: 2021-04-30 | End: 2021-10-21 | Stop reason: SDUPTHER

## 2021-04-30 RX ORDER — LIDOCAINE 50 MG/G
1 PATCH TOPICAL DAILY
Qty: 10 PATCH | Refills: 0 | Status: SHIPPED | OUTPATIENT
Start: 2021-04-30

## 2021-04-30 RX ADMIN — KETOROLAC TROMETHAMINE 30 MG: 30 INJECTION, SOLUTION INTRAMUSCULAR; INTRAVENOUS at 14:47

## 2021-04-30 NOTE — PROGRESS NOTES
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  Paraspinal tenderness, positive leg raise @ 30 degrees  Tylenol/Ibuprofen improved pain but did not completely resolve  Denies any urinary incontinence, bowel incontinence, saddle anesthesia; no focal neuro deficits    Dx: Acute muscle spasm   Plan  - Toradol injection today   - Flexeril  10 mg TID PRN  - Lidocaine patch q12 hrs PRN  - Recommended OMT   - F/u 1 week          Problem List Items Addressed This Visit        Other    Lower back pain - Primary     Acute, without sciatica  - For past 2 days patient has constant L lower back pain that worsens with acute movement that radiates to L inguinal region  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  Paraspinal tenderness, positive leg raise @ 30 degrees  Tylenol/Ibuprofen improved pain but did not completely resolve  Denies any urinary incontinence, bowel incontinence, saddle anesthesia; no focal neuro deficits    Dx: Acute muscle spasm   Plan  - Toradol injection today   - Flexeril  10 mg TID PRN  - Lidocaine patch q12 hrs PRN  - Recommended OMT   - F/u 1 week          Relevant Medications    cyclobenzaprine (FLEXERIL) 10 mg tablet    lidocaine (LIDODERM) 5 %      Other Visit Diagnoses     Back muscle spasm        Relevant Medications    ketorolac (TORADOL) injection 30 mg (Completed)    cyclobenzaprine (FLEXERIL) 10 mg tablet    lidocaine (LIDODERM) 5 %            Subjective:      Patient ID: Milagros Maciel is a 36 y o  female      Patient is a 37 YO female with PMHx of Crohns followed by GI had a recent flare-up, now taking Imuran, tapered off prednisone  She is complaining today of back pain that is located in her lower back that started 2 days ago  She rates it a 7/10 right now, any movement worsens the pain  She describes it as a constant pain, and then a sharp pain when she lifts something  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  Patient notes that when she feels the back pain it sometimes radiates down her L leg  She denies any weakness when walking, no numbness, or tingling  Today she also noticed some L inguinal/groin pain  She has tried [place], which provided some relief  She has tried Tylenol, Advil and went to work but the pain recurred, Salonpass ointment at night and some warm compress  She is on the Imuran for Crohn's which she has been taking for past 5 years  She denies any symptoms of UTI, no urgency, frequency, no dysuria, or blood in urine  No F/C/N/V  On ROS patient reports 1 week ago she had sharp CP, she took tylenol and it resolved  The following portions of the patient's history were reviewed and updated as appropriate: current medications, past medical history, past social history and problem list     Review of Systems   Constitutional: Negative for chills and fever  HENT: Negative for sinus pressure and sinus pain  Respiratory: Negative for cough and shortness of breath  Cardiovascular: Negative for chest pain and leg swelling  Gastrointestinal: Positive for abdominal pain  Negative for constipation, diarrhea, nausea and vomiting  Musculoskeletal: Positive for back pain  Neurological: Negative for weakness, light-headedness, numbness and headaches  Objective:      /70 (BP Location: Left arm, Patient Position: Sitting, Cuff Size: Standard)   Pulse 72   Temp 99 3 °F (37 4 °C) (Temporal)   Resp 14   Ht 4' 11" (1 499 m)   Wt 52 6 kg (116 lb)   BMI 23 43 kg/m²          Physical Exam  Constitutional:       Appearance: Normal appearance     HENT: Head: Normocephalic and atraumatic  Cardiovascular:      Rate and Rhythm: Normal rate and regular rhythm  Pulses: Normal pulses  Heart sounds: Normal heart sounds  Pulmonary:      Effort: Pulmonary effort is normal       Breath sounds: Normal breath sounds  Abdominal:      Palpations: Abdomen is soft  Musculoskeletal:      Lumbar back: She exhibits tenderness, pain and spasm  Back:    Neurological:      Mental Status: She is alert

## 2021-04-30 NOTE — ASSESSMENT & PLAN NOTE
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

## 2021-05-05 ENCOUNTER — OFFICE VISIT (OUTPATIENT)
Dept: URGENT CARE | Facility: MEDICAL CENTER | Age: 41
End: 2021-05-05
Payer: COMMERCIAL

## 2021-05-05 VITALS
HEART RATE: 76 BPM | RESPIRATION RATE: 18 BRPM | SYSTOLIC BLOOD PRESSURE: 127 MMHG | DIASTOLIC BLOOD PRESSURE: 73 MMHG | OXYGEN SATURATION: 99 % | WEIGHT: 116 LBS | BODY MASS INDEX: 23.39 KG/M2 | TEMPERATURE: 98.2 F | HEIGHT: 59 IN

## 2021-05-05 DIAGNOSIS — R30.0 DYSURIA: Primary | ICD-10-CM

## 2021-05-05 LAB
SL AMB  POCT GLUCOSE, UA: ABNORMAL
SL AMB LEUKOCYTE ESTERASE,UA: ABNORMAL
SL AMB POCT BILIRUBIN,UA: ABNORMAL
SL AMB POCT BLOOD,UA: ABNORMAL
SL AMB POCT CLARITY,UA: ABNORMAL
SL AMB POCT COLOR,UA: YELLOW
SL AMB POCT KETONES,UA: ABNORMAL
SL AMB POCT NITRITE,UA: ABNORMAL
SL AMB POCT PH,UA: 7.5
SL AMB POCT SPECIFIC GRAVITY,UA: 1050
SL AMB POCT URINE PROTEIN: ABNORMAL
SL AMB POCT UROBILINOGEN: 0.2

## 2021-05-05 PROCEDURE — G0382 LEV 3 HOSP TYPE B ED VISIT: HCPCS | Performed by: PHYSICIAN ASSISTANT

## 2021-05-05 PROCEDURE — 87086 URINE CULTURE/COLONY COUNT: CPT | Performed by: PHYSICIAN ASSISTANT

## 2021-05-05 PROCEDURE — 81002 URINALYSIS NONAUTO W/O SCOPE: CPT | Performed by: PHYSICIAN ASSISTANT

## 2021-05-05 RX ORDER — NITROFURANTOIN 25; 75 MG/1; MG/1
100 CAPSULE ORAL 2 TIMES DAILY
Qty: 14 CAPSULE | Refills: 0 | Status: SHIPPED | OUTPATIENT
Start: 2021-05-05 | End: 2021-05-12

## 2021-05-05 NOTE — PROGRESS NOTES
[facility]        NAME: Del Bains is a 36 y o  female  : 1980    MRN: [id number]  DATE: May 5, 2021  TIME: 10:27 AM    Assessment and Plan   Dysuria [R30 0]  1  Dysuria  POCT urine dip    Urine culture     Urine dip shows large amount of blood and leukocytes  Symptoms consistent with UTI  Will treat empirically with Macrobid and sent for culture  Recommended plenty of fluids  Patient Instructions     Drink plenty of fluids   may use azo for dysuria   Macrobid as directed  Follow up with PCP in 3-5 days  Proceed to  ER if symptoms worsen  Chief Complaint     Chief Complaint   Patient presents with    Possible UTI     pt states that she is having frequency, urgency, and pain with urination         History of Present Illness        Patient is a 59-year-old female who presents today with complaints of urinary urgency, frequency, dysuria starting this morning  No hematuria  No fever, chills, flank, abdominal pain  Review of Systems   Review of Systems   Constitutional: Negative for chills and fever  Respiratory: Negative for shortness of breath  Cardiovascular: Negative for chest pain  Gastrointestinal: Negative for abdominal pain  Genitourinary: Positive for dysuria, frequency and urgency  Negative for flank pain and hematuria           Current Medications       Current Outpatient Medications:     azaTHIOprine (IMURAN) 50 mg tablet, Take 1 tablet (50 mg total) by mouth daily, Disp: 30 tablet, Rfl: 3    ciprofloxacin-dexamethasone (CIPRODEX) otic suspension, Administer 4 drops to the right ear 2 (two) times a day, Disp: 7 5 mL, Rfl: 0    cyclobenzaprine (FLEXERIL) 10 mg tablet, Take 1 tablet (10 mg total) by mouth 3 (three) times a day as needed for muscle spasms, Disp: 21 tablet, Rfl: 0    dicyclomine (BENTYL) 10 mg capsule, Take 1 capsule (10 mg total) by mouth 3 (three) times a day as needed (abd pain), Disp: 90 capsule, Rfl: 3    hydrocortisone (ANUSOL-HC) 2 5 % rectal cream, Apply topically 2 (two) times a day Apply topically 2 times daily for 7-10 days, Disp: 28 g, Rfl: 0    hydrocortisone (ANUSOL-HC) 25 mg suppository, Insert 1 suppository (25 mg total) into the rectum 2 (two) times a day, Disp: 12 suppository, Rfl: 3    lidocaine (LIDODERM) 5 %, Apply 1 patch topically daily Remove & Discard patch within 12 hours or as directed by MD, Disp: 10 patch, Rfl: 0    mesalamine (ASACOL) 800 MG EC tablet, Take 2 tablets (1,600 mg total) by mouth 2 (two) times a day, Disp: 120 tablet, Rfl: 3    mesalamine (PENTASA) 500 mg CR capsule, Take 2 capsules (1,000 mg total) by mouth 3 (three) times a day, Disp: 180 capsule, Rfl: 3    Multiple Vitamins-Minerals (MULTIVITAMIN ADULT PO), Daily, Disp: , Rfl:     oxyCODONE (OXY-IR) 5 MG capsule, Take 1 capsule (5 mg total) by mouth every 6 (six) hours as needed for severe pain for up to 10 doses Can substitute oxycodone tablets for capsulesMax Daily Amount: 20 mg (Patient not taking: Reported on 2021), Disp: 10 capsule, Rfl: 0    pantoprazole (PROTONIX) 40 mg tablet, Take 1 tablet (40 mg total) by mouth daily, Disp: 30 tablet, Rfl: 3    Probiotic Product (PROBIOTIC-10 PO), Probiotic  1 tab daily, Disp: , Rfl:     Tranexamic Acid 650 MG TABS, Take 2 tablets (1,300 mg total) by mouth 3 (three) times a day (Patient not taking: Reported on 2021), Disp: 5 tablet, Rfl: 4    Current Allergies     Allergies as of 2021    (No Known Allergies)            The following portions of the patient's history were reviewed and updated as appropriate: allergies, current medications, past family history, past medical history, past social history, past surgical history and problem list      Past Medical History:   Diagnosis Date    Anxiety     Autoimmune disorder ([address] )     Crohn disease ([facility] [facility] )     Ear infection        Past Surgical History:   Procedure Laterality Date     SECTION      COLONOSCOPY      UPPER GASTROINTESTINAL ENDOSCOPY         Family History   Problem Relation Age of Onset    Obesity Mother     Hypertension Mother     Obesity Father     Hypertension Father     Colon cancer Neg Hx     Liver disease Neg Hx          Medications have been verified  Objective   There were no vitals taken for this visit  Physical Exam     Physical Exam  Constitutional:       General: She is not in acute distress  Appearance: Normal appearance  She is normal weight  She is not ill-appearing  HENT:      Mouth/Throat:      Mouth: Mucous membranes are moist       Pharynx: Oropharynx is clear  Cardiovascular:      Rate and Rhythm: Normal rate and regular rhythm  Pulmonary:      Effort: Pulmonary effort is normal       Breath sounds: Normal breath sounds  Abdominal:      General: Bowel sounds are normal  There is no distension  Palpations: Abdomen is soft  Tenderness: There is no right CVA tenderness or left CVA tenderness  Skin:     General: Skin is warm and dry  Neurological:      Mental Status: She is alert

## 2021-05-05 NOTE — LETTER
May 5, 2021     Patient: Enzo Matos   YOB: 1980   Date of Visit: 5/5/2021       To Whom it May Concern:    Enzo Matos was seen in my clinic on 5/5/2021  Please excuse today    If you have any questions or concerns, please don't hesitate to call           Sincerely,          Jose Angel Trujillo PA-C        CC: Enzo Matos

## 2021-05-06 LAB — BACTERIA UR CULT: NORMAL

## 2021-06-11 ENCOUNTER — OFFICE VISIT (OUTPATIENT)
Dept: FAMILY MEDICINE CLINIC | Facility: CLINIC | Age: 41
End: 2021-06-11

## 2021-06-11 VITALS — TEMPERATURE: 98.8 F | WEIGHT: 118 LBS | HEIGHT: 59 IN | BODY MASS INDEX: 23.79 KG/M2

## 2021-06-11 DIAGNOSIS — M99.02 SOMATIC DYSFUNCTION OF THORACIC REGION: ICD-10-CM

## 2021-06-11 DIAGNOSIS — M54.50 ACUTE MIDLINE LOW BACK PAIN WITHOUT SCIATICA: Primary | ICD-10-CM

## 2021-06-11 DIAGNOSIS — M99.03 SOMATIC DYSFUNCTION OF LUMBAR REGION: ICD-10-CM

## 2021-06-11 DIAGNOSIS — M99.05 SOMATIC DYSFUNCTION OF PELVIS REGION: ICD-10-CM

## 2021-06-11 PROCEDURE — 99213 OFFICE O/P EST LOW 20 MIN: CPT | Performed by: FAMILY MEDICINE

## 2021-06-11 NOTE — PROGRESS NOTES
Assessment/Plan     This is a 36 y o  female who presents for initial OMT for:  1  Acute midline low back pain without sciatica     2  Somatic dysfunction of thoracic region     3  Somatic dysfunction of lumbar region     4  Somatic dysfunction of pelvis region         Plan:   1  Patient tolerated OMT well for the above problems,  advised patient to drink fluids and can use NSAID for soreness after treatment     2  OMT Follow up in 4 weeks  Karla Chen is a 36 y o  female and is here for initial OMT  The patient was seen in office about one month ago with acute lower back like, likely muscle spasm due to heavy lifting 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 no  Has the patient completed physical therapy for this condition?  no  Did Patient symptoms improve from last OMT appointment? first visit    The following portions of the patient's history were reviewed and updated as appropriate: allergies, current medications, past family history, past medical history, past social history, past surgical history and problem list       Current Outpatient Medications:     azaTHIOprine (IMURAN) 50 mg tablet, Take 1 tablet (50 mg total) by mouth daily, Disp: 30 tablet, Rfl: 3    ciprofloxacin-dexamethasone (CIPRODEX) otic suspension, Administer 4 drops to the right ear 2 (two) times a day, Disp: 7 5 mL, Rfl: 0    cyclobenzaprine (FLEXERIL) 10 mg tablet, Take 1 tablet (10 mg total) by mouth 3 (three) times a day as needed for muscle spasms, Disp: 21 tablet, Rfl: 0    dicyclomine (BENTYL) 10 mg capsule, Take 1 capsule (10 mg total) by mouth 3 (three) times a day as needed (abd pain), Disp: 90 capsule, Rfl: 3    hydrocortisone (ANUSOL-HC) 2 5 % rectal cream, Apply topically 2 (two) times a day Apply topically 2 times daily for 7-10 days, Disp: 28 g, Rfl: 0    hydrocortisone (ANUSOL-HC) 25 mg suppository, Insert 1 suppository (25 mg total) into the rectum 2 (two) times a day, Disp: 12 suppository, Rfl: 3    lidocaine (LIDODERM) 5 %, Apply 1 patch topically daily Remove & Discard patch within 12 hours or as directed by MD, Disp: 10 patch, Rfl: 0    mesalamine (ASACOL) 800 MG EC tablet, Take 2 tablets (1,600 mg total) by mouth 2 (two) times a day, Disp: 120 tablet, Rfl: 3    Multiple Vitamins-Minerals (MULTIVITAMIN ADULT PO), Daily, Disp: , Rfl:     oxyCODONE (OXY-IR) 5 MG capsule, Take 1 capsule (5 mg total) by mouth every 6 (six) hours as needed for severe pain for up to 10 doses Can substitute oxycodone tablets for capsulesMax Daily Amount: 20 mg, Disp: 10 capsule, Rfl: 0    pantoprazole (PROTONIX) 40 mg tablet, Take 1 tablet (40 mg total) by mouth daily, Disp: 30 tablet, Rfl: 3    Probiotic Product (PROBIOTIC-10 PO), Probiotic  1 tab daily, Disp: , Rfl:     Tranexamic Acid 650 MG TABS, Take 2 tablets (1,300 mg total) by mouth 3 (three) times a day, Disp: 5 tablet, Rfl: 4    mesalamine (PENTASA) 500 mg CR capsule, Take 2 capsules (1,000 mg total) by mouth 3 (three) times a day, Disp: 180 capsule, Rfl: 3    Review of Systems  Do you have pain that bothers you in your daily life? yes  Review of Systems   Constitutional: Negative for activity change and fever  HENT: Negative for congestion and rhinorrhea  Respiratory: Negative for cough and shortness of breath  Cardiovascular: Negative for chest pain and palpitations  Gastrointestinal: Negative for abdominal pain  Genitourinary:        No urinary or bowel incontinence     Musculoskeletal: Positive for back pain  Negative for gait problem, neck pain and neck stiffness  Skin: Negative for rash and wound  Neurological: Negative for dizziness and headaches  Psychiatric/Behavioral: The patient is not nervous/anxious          Objective     OMT Exam   Thoracic T5-9:   Somatic Dysfunction:  Tissue Texture Changes, Asymmetry  and Restriction  Severity :  2  Osteopathic Findings: Hypertonic paraspinal muscle  Treatment Method: ST and MFR  Response: improved  Thoracic T10-12:  Somatic Dysfunction:  Tissue Texture Changes, Asymmetry  and Restriction  Severity :  2  Osteopathic Findings: Hypertonic paraspinal muscles R>L  Treatment Method: ST and MFR  Response: improved  Lumbar:  Somatic Dysfunction:  Tissue Texture Changes, Asymmetry , Restriction and Tenderness  Severity :  3  Osteopathic Findings: Hypertonic paraspinal muscle R>L, tenderpoint at RL4  Treatment Method: ST, MFR and CS  Response: improved  Pelvis:  Somatic Dysfunction:  Tissue Texture Changes, Asymmetry , Restriction and Tenderness  Severity :  2  Osteopathic Findings: Left anterior rotated innominate  Treatment Method: ME  Response: improved          Yulissa Anguiano DO PGY-2  [place] [address]

## 2021-07-14 DIAGNOSIS — R10.13 EPIGASTRIC PAIN: ICD-10-CM

## 2021-07-14 RX ORDER — PANTOPRAZOLE SODIUM 40 MG/1
40 TABLET, DELAYED RELEASE ORAL DAILY
Qty: 30 TABLET | Refills: 2 | Status: SHIPPED | OUTPATIENT
Start: 2021-07-14 | End: 2022-08-03

## 2021-07-14 NOTE — TELEPHONE ENCOUNTER
GI Physician: Dr Danuta Carranza for Medication: pantoprazole     Dose: 40mg    Quantity: 30 tablet     Pharmacy and Location: [facility]

## 2021-07-14 NOTE — TELEPHONE ENCOUNTER
Pt of Dr Jossy Ramírez last seen 4/14/2021  HX:crohns, GERD  Pt requesting refill of pantoprazole 40mg, 90 day supply     Placed order and added 2 refills

## 2021-07-21 ENCOUNTER — TELEPHONE (OUTPATIENT)
Dept: FAMILY MEDICINE CLINIC | Facility: CLINIC | Age: 41
End: 2021-07-21

## 2021-07-22 ENCOUNTER — OFFICE VISIT (OUTPATIENT)
Dept: FAMILY MEDICINE CLINIC | Facility: CLINIC | Age: 41
End: 2021-07-22

## 2021-07-22 VITALS
RESPIRATION RATE: 16 BRPM | HEIGHT: 59 IN | DIASTOLIC BLOOD PRESSURE: 70 MMHG | BODY MASS INDEX: 23.18 KG/M2 | SYSTOLIC BLOOD PRESSURE: 116 MMHG | TEMPERATURE: 97.9 F | HEART RATE: 82 BPM | WEIGHT: 115 LBS

## 2021-07-22 DIAGNOSIS — Z00.00 ANNUAL PHYSICAL EXAM: ICD-10-CM

## 2021-07-22 DIAGNOSIS — K50.00 CROHN'S DISEASE INVOLVING TERMINAL ILEUM (HCC): ICD-10-CM

## 2021-07-22 DIAGNOSIS — Z12.31 ENCOUNTER FOR SCREENING MAMMOGRAM FOR BREAST CANCER: ICD-10-CM

## 2021-07-22 DIAGNOSIS — Z12.31 ENCOUNTER FOR SCREENING MAMMOGRAM FOR MALIGNANT NEOPLASM OF BREAST: ICD-10-CM

## 2021-07-22 DIAGNOSIS — Z00.00 ENCOUNTER FOR ANNUAL PHYSICAL EXAM: Primary | ICD-10-CM

## 2021-07-22 PROCEDURE — 99396 PREV VISIT EST AGE 40-64: CPT | Performed by: FAMILY MEDICINE

## 2021-07-22 NOTE — PROGRESS NOTES
[facility]    NAME: Tony Nicole  AGE: 39 y o  SEX: female  : 1980     DATE: 2021     Assessment and Plan:     Problem List Items Addressed This Visit        Digestive    Crohn's disease involving terminal ileum ([facility] [address] )    Relevant Orders    Comprehensive metabolic panel    TSH, 3rd generation with Free T4 reflex    CBC and differential    Lipid panel      Other Visit Diagnoses     Encounter for annual physical exam    -  Primary    Relevant Orders    Comprehensive metabolic panel    TSH, 3rd generation with Free T4 reflex    CBC and differential    Lipid panel    HIV 1/2 Antigen/Antibody (4th Generation) w Reflex [facility]    Hepatitis C antibody    Encounter for screening mammogram for malignant neoplasm of breast        Relevant Orders    Mammo screening bilateral w 3d & cad    Annual physical exam        Encounter for screening mammogram for breast cancer            Mammogram ordered for screening  Immunizations and preventive care screenings were discussed with patient today  Appropriate education was printed on patient's after visit summary  Counseling:  Alcohol/drug use: discussed moderation in alcohol intake, the recommendations for healthy alcohol use, and avoidance of illicit drug use  Dental Health: discussed importance of regular tooth brushing, flossing, and dental visits  Injury prevention: discussed safety/seat belts, safety helmets, smoke detectors, carbon dioxide detectors, and smoking near bedding or upholstery  Sexual health: discussed sexually transmitted diseases, partner selection, use of condoms, avoidance of unintended pregnancy, and contraceptive alternatives  · Exercise: the importance of regular exercise/physical activity was discussed  Recommend exercise 3-5 times per week for at least 30 minutes  Return in 1 year (on 2022)       Chief Complaint: Chief Complaint   Patient presents with    Physical Exam      History of Present Illness:     Adult Annual Physical   Patient here for a comprehensive physical exam  The patient reports that she is doing well overall  She states her back pain has improved since her previous OMT session  She is following with GI regularly due to her hx of Crohns  She also has an appointment with ENT on August 3rd due to right ear pain with discharge that had occurred a few weeks ago  No family hx of Breast or Colon Cancer    Diet and Physical Activity  · Diet/Nutrition: well balanced diet and consuming 3-5 servings of fruits/vegetables daily  · Exercise: 5-7 times a week on average  Depression Screening  PHQ-9 Depression Screening    PHQ-9:   Frequency of the following problems over the past two weeks:      Little interest or pleasure in doing things: 0 - not at all  Feeling down, depressed, or hopeless: 0 - not at all  PHQ-2 Score: 0       General Health  · Sleep: gets more than 8 hours of sleep on average  · Hearing: decreased - right  · Vision: most recent eye exam <1 year ago and wears glasses and contacts  · Dental: regular dental visits, brushes teeth three times daily and does not floss  /GYN Health  · Patient is: premenopausal   · Last menstrual period: 7/18  · Contraceptive method: none     Review of Systems:     Review of Systems   Constitutional: Negative for appetite change, chills, fatigue and fever  HENT: Negative for congestion, rhinorrhea and trouble swallowing  Eyes: Negative for visual disturbance  Respiratory: Negative for cough and shortness of breath  Cardiovascular: Negative for chest pain and palpitations  Gastrointestinal: Negative for abdominal pain, nausea and vomiting  Genitourinary: Negative for dysuria  Neurological: Negative for dizziness and headaches        Past Medical History:     Past Medical History:   Diagnosis Date    Anxiety     Autoimmune disorder ([facility] )     Crohn disease ([facility] )     Ear infection       Past Surgical History:     Past Surgical History:   Procedure Laterality Date     SECTION      COLONOSCOPY      UPPER GASTROINTESTINAL ENDOSCOPY        Social History:     Social History     Socioeconomic History    Marital status: /Civil Union     Spouse name: None    Number of children: None    Years of education: None    Highest education level: None   Occupational History    None   Tobacco Use    Smoking status: Never Smoker    Smokeless tobacco: Never Used   Vaping Use    Vaping Use: Never used   Substance and Sexual Activity    Alcohol use: Yes     Alcohol/week: 2 0 standard drinks     Types: 2 Standard drinks or equivalent per week    Drug use: No    Sexual activity: Yes     Partners: Male     Birth control/protection: Other     Comment:    Other Topics Concern    None   Social History Narrative    None     Social Determinants of Health     Financial Resource Strain: Low Risk     Difficulty of Paying Living Expenses: Not hard at all   Food Insecurity: No Food Insecurity    Worried About Running Out of Food in the Last Year: Never true    [address] in the Last Year: Never true   Transportation Needs: No Transportation Needs    Lack of Transportation (Medical): No    Lack of Transportation (Non-Medical):  No   Physical Activity:     Days of Exercise per Week:     Minutes of Exercise per Session:    Stress:     Feeling of Stress :    Social Connections:     Frequency of Communication with Friends and Family:     Frequency of Social Gatherings with Friends and Family:     Attends [employer] Services:     Active Member of Clubs or Organizations:     Attends Club or Organization Meetings:     Marital Status:    Intimate Partner Violence:     Fear of Current or Ex-Partner:     Emotionally Abused:     Physically Abused:     Sexually Abused:       Family History:     Family History   Problem Relation Age of Onset    Obesity Mother     Hypertension Mother     Obesity Father     Hypertension Father     Colon cancer Neg Hx     Liver disease Neg Hx       Current Medications:     Current Outpatient Medications   Medication Sig Dispense Refill    azaTHIOprine (IMURAN) 50 mg tablet Take 1 tablet (50 mg total) by mouth daily 30 tablet 3    ciprofloxacin-dexamethasone (CIPRODEX) otic suspension Administer 4 drops to the right ear 2 (two) times a day 7 5 mL 0    hydrocortisone (ANUSOL-HC) 25 mg suppository Insert 1 suppository (25 mg total) into the rectum 2 (two) times a day 12 suppository 3    Multiple Vitamins-Minerals (MULTIVITAMIN ADULT PO) Daily      pantoprazole (PROTONIX) 40 mg tablet Take 1 tablet (40 mg total) by mouth daily 30 tablet 2    Probiotic Product (PROBIOTIC-10 PO) Probiotic   1 tab daily      Tranexamic Acid 650 MG TABS Take 2 tablets (1,300 mg total) by mouth 3 (three) times a day 5 tablet 4    cyclobenzaprine (FLEXERIL) 10 mg tablet Take 1 tablet (10 mg total) by mouth 3 (three) times a day as needed for muscle spasms (Patient not taking: Reported on 7/22/2021) 21 tablet 0    dicyclomine (BENTYL) 10 mg capsule Take 1 capsule (10 mg total) by mouth 3 (three) times a day as needed (abd pain) (Patient not taking: Reported on 7/22/2021) 90 capsule 3    hydrocortisone (ANUSOL-HC) 2 5 % rectal cream Apply topically 2 (two) times a day Apply topically 2 times daily for 7-10 days (Patient not taking: Reported on 7/22/2021) 28 g 0    lidocaine (LIDODERM) 5 % Apply 1 patch topically daily Remove & Discard patch within 12 hours or as directed by MD (Patient not taking: Reported on 7/22/2021) 10 patch 0    mesalamine (ASACOL) 800 MG EC tablet Take 2 tablets (1,600 mg total) by mouth 2 (two) times a day (Patient not taking: Reported on 7/22/2021) 120 tablet 3    mesalamine (PENTASA) 500 mg CR capsule Take 2 capsules (1,000 mg total) by mouth 3 (three) times a day (Patient not taking: Reported on 7/22/2021) 180 capsule 3    oxyCODONE (OXY-IR) 5 MG capsule Take 1 capsule (5 mg total) by mouth every 6 (six) hours as needed for severe pain for up to 10 doses Can substitute oxycodone tablets for capsulesMax Daily Amount: 20 mg (Patient not taking: Reported on 7/22/2021) 10 capsule 0     No current facility-administered medications for this visit  Allergies:     No Known Allergies   Physical Exam:     /70 (BP Location: Left arm, Patient Position: Sitting, Cuff Size: Standard)   Pulse 82   Temp 97 9 °F (36 6 °C) (Temporal)   Resp 16   Ht 4' 11" (1 499 m)   Wt 52 2 kg (115 lb)   BMI 23 23 kg/m²     Physical Exam  Vitals reviewed  Exam conducted with a chaperone present  Constitutional:       General: She is not in acute distress  Appearance: Normal appearance  She is not ill-appearing or toxic-appearing  HENT:      Head: Normocephalic and atraumatic  Comments: Cerumen seen in right ear canal with some erythema of canal wall     Right Ear: Tympanic membrane normal       Left Ear: Tympanic membrane, ear canal and external ear normal       Nose: Nose normal       Mouth/Throat:      Mouth: Mucous membranes are moist    Eyes:      Extraocular Movements: Extraocular movements intact  Pupils: Pupils are equal, round, and reactive to light  Cardiovascular:      Rate and Rhythm: Normal rate and regular rhythm  Pulmonary:      Effort: Pulmonary effort is normal       Breath sounds: Normal breath sounds  No wheezing or rales  Chest:      Chest wall: No mass, deformity, swelling or tenderness  Breasts:         Right: No swelling, bleeding, inverted nipple, mass, nipple discharge, skin change or tenderness  Left: No swelling, bleeding, inverted nipple, mass, nipple discharge, skin change or tenderness  Abdominal:      General: Abdomen is flat  Palpations: Abdomen is soft  Tenderness: There is no abdominal tenderness     Musculoskeletal:      Cervical back: Neck supple  Right lower leg: No edema  Left lower leg: No edema  Lymphadenopathy:      Cervical: No cervical adenopathy  Skin:     General: Skin is warm and dry  Neurological:      General: No focal deficit present  Mental Status: She is alert and oriented to person, place, and time  Sensory: No sensory deficit  Motor: No weakness  Psychiatric:         Mood and Affect: Mood normal          Thought Content:  Thought content normal           Priscille Seip, DO  [address]

## 2021-07-22 NOTE — PATIENT INSTRUCTIONS

## 2021-08-03 ENCOUNTER — OFFICE VISIT (OUTPATIENT)
Dept: FAMILY MEDICINE CLINIC | Facility: CLINIC | Age: 41
End: 2021-08-03

## 2021-08-03 VITALS — WEIGHT: 117.4 LBS | BODY MASS INDEX: 23.67 KG/M2 | HEIGHT: 59 IN | TEMPERATURE: 99.2 F

## 2021-08-03 DIAGNOSIS — M54.2 NECK PAIN ON LEFT SIDE: ICD-10-CM

## 2021-08-03 DIAGNOSIS — M54.50 ACUTE MIDLINE LOW BACK PAIN WITHOUT SCIATICA: Primary | ICD-10-CM

## 2021-08-03 DIAGNOSIS — M99.01 SOMATIC DYSFUNCTION OF CERVICAL REGION: ICD-10-CM

## 2021-08-03 DIAGNOSIS — M99.00 SOMATIC DYSFUNCTION OF HEAD REGION: ICD-10-CM

## 2021-08-03 DIAGNOSIS — M99.02 SOMATIC DYSFUNCTION OF THORACIC REGION: ICD-10-CM

## 2021-08-03 PROCEDURE — 87186 SC STD MICRODIL/AGAR DIL: CPT | Performed by: OTOLARYNGOLOGY

## 2021-08-03 PROCEDURE — 87205 SMEAR GRAM STAIN: CPT | Performed by: OTOLARYNGOLOGY

## 2021-08-03 PROCEDURE — 87070 CULTURE OTHR SPECIMN AEROBIC: CPT | Performed by: OTOLARYNGOLOGY

## 2021-08-03 PROCEDURE — 99213 OFFICE O/P EST LOW 20 MIN: CPT | Performed by: FAMILY MEDICINE

## 2021-08-03 PROCEDURE — 87102 FUNGUS ISOLATION CULTURE: CPT | Performed by: OTOLARYNGOLOGY

## 2021-08-03 NOTE — PROGRESS NOTES
Assessment/Plan     This is a 39 y o  female who presents for OMT follow-up for:  1  Acute midline low back pain without sciatica     2  Neck pain on left side     3  Somatic dysfunction of head region     4  Somatic dysfunction of cervical region     5  Somatic dysfunction of thoracic region         Plan:   1  Patient tolerated OMT well for the above problems,  advised patient to drink fluids and can use NSAID for soreness after treatment     2  OMT Follow up in 4 weeks  Freida Hendrickson is a 39 y o  female and is here for a OMT follow up  The patient reports she is doing well overall  She has had improvement in her back pain however 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 no  Has the patient completed physical therapy for this condition? no  Did Patient symptoms improve from last OMT appointment? yes    The following portions of the patient's history were reviewed and updated as appropriate: allergies, current medications, past family history, past medical history, past social history, past surgical history and problem list     Review of Systems  Do you have pain that bothers you in your daily life? no  Review of Systems   Constitutional: Negative for fatigue  HENT: Negative for congestion  Respiratory: Negative for cough and shortness of breath  Cardiovascular: Negative for chest pain  Gastrointestinal: Negative for abdominal pain  Musculoskeletal: Positive for back pain and neck pain  Negative for neck stiffness  Skin: Negative for rash and wound  Neurological: Negative for dizziness, weakness and headaches  Psychiatric/Behavioral: Negative for sleep disturbance         Objective     OMT Exam   Cervical: Somatic Dysfunction:  Tissue Texture Changes, Asymmetry  and Restriction  Severity: 2  Osteopathic Findings: Hypertonic paraspinal cervical muscle, tenderness C3  Treatment Method: ME, ST  Response: improved  Thoracic T5-9:   Somatic Dysfunction:  Tissue Texture Changes, Asymmetry  and Restriction  Severity :  2  Osteopathic Findings: Hypertonic paraspinal muscle  Treatment Method: ST and MFR  Response: improved  Thoracic T10-12:  Somatic Dysfunction:  Tissue Texture Changes, Asymmetry  and Restriction  Severity :  2  Osteopathic Findings: Hypertonic paraspinal muscles R>L  Treatment Method: ST and MFR  Response: improved  Lumbar:  Somatic Dysfunction:  Tissue Texture Changes, Asymmetry , Restriction and Tenderness  Severity :  3  Osteopathic Findings: Hypertonic paraspinal muscle R>L, tenderpoint at RL4  Treatment Method: ST, MFR and CS  Response: improved  Pelvis:  Somatic Dysfunction:  Tissue Texture Changes, Asymmetry , Restriction and Tenderness  Severity :  2  Osteopathic Findings: Left anterior rotated innominate  Treatment Method: ME  Response: improved           Herminia Mahajan DO PGY-3  [facility]

## 2021-08-07 ENCOUNTER — APPOINTMENT (OUTPATIENT)
Dept: LAB | Facility: CLINIC | Age: 41
End: 2021-08-07
Payer: COMMERCIAL

## 2021-08-07 DIAGNOSIS — K50.00 CROHN'S DISEASE INVOLVING TERMINAL ILEUM (HCC): ICD-10-CM

## 2021-08-07 DIAGNOSIS — Z00.00 ENCOUNTER FOR ANNUAL PHYSICAL EXAM: ICD-10-CM

## 2021-08-07 LAB
ALBUMIN SERPL BCP-MCNC: 3.7 G/DL (ref 3.5–5)
ALP SERPL-CCNC: 64 U/L (ref 46–116)
ALT SERPL W P-5'-P-CCNC: 22 U/L (ref 12–78)
ANION GAP SERPL CALCULATED.3IONS-SCNC: 7 MMOL/L (ref 4–13)
AST SERPL W P-5'-P-CCNC: 18 U/L (ref 5–45)
BASOPHILS # BLD AUTO: 0.03 THOUSANDS/ΜL (ref 0–0.1)
BASOPHILS NFR BLD AUTO: 1 % (ref 0–1)
BILIRUB SERPL-MCNC: 0.99 MG/DL (ref 0.2–1)
BUN SERPL-MCNC: 9 MG/DL (ref 5–25)
CALCIUM SERPL-MCNC: 8.7 MG/DL (ref 8.3–10.1)
CHLORIDE SERPL-SCNC: 106 MMOL/L (ref 100–108)
CHOLEST SERPL-MCNC: 178 MG/DL (ref 50–200)
CO2 SERPL-SCNC: 26 MMOL/L (ref 21–32)
CREAT SERPL-MCNC: 0.54 MG/DL (ref 0.6–1.3)
EOSINOPHIL # BLD AUTO: 0.14 THOUSAND/ΜL (ref 0–0.61)
EOSINOPHIL NFR BLD AUTO: 3 % (ref 0–6)
ERYTHROCYTE [DISTWIDTH] IN BLOOD BY AUTOMATED COUNT: 11.9 % (ref 11.6–15.1)
GFR SERPL CREATININE-BSD FRML MDRD: 118 ML/MIN/1.73SQ M
GLUCOSE P FAST SERPL-MCNC: 92 MG/DL (ref 65–99)
HCT VFR BLD AUTO: 34.9 % (ref 34.8–46.1)
HCV AB SER QL: NORMAL
HDLC SERPL-MCNC: 67 MG/DL
HGB BLD-MCNC: 11.7 G/DL (ref 11.5–15.4)
IMM GRANULOCYTES # BLD AUTO: 0.02 THOUSAND/UL (ref 0–0.2)
IMM GRANULOCYTES NFR BLD AUTO: 1 % (ref 0–2)
LDLC SERPL CALC-MCNC: 100 MG/DL (ref 0–100)
LYMPHOCYTES # BLD AUTO: 1.33 THOUSANDS/ΜL (ref 0.6–4.47)
LYMPHOCYTES NFR BLD AUTO: 31 % (ref 14–44)
MCH RBC QN AUTO: 30.6 PG (ref 26.8–34.3)
MCHC RBC AUTO-ENTMCNC: 33.5 G/DL (ref 31.4–37.4)
MCV RBC AUTO: 91 FL (ref 82–98)
MONOCYTES # BLD AUTO: 0.28 THOUSAND/ΜL (ref 0.17–1.22)
MONOCYTES NFR BLD AUTO: 7 % (ref 4–12)
NEUTROPHILS # BLD AUTO: 2.45 THOUSANDS/ΜL (ref 1.85–7.62)
NEUTS SEG NFR BLD AUTO: 57 % (ref 43–75)
NONHDLC SERPL-MCNC: 111 MG/DL
NRBC BLD AUTO-RTO: 0 /100 WBCS
PLATELET # BLD AUTO: 266 THOUSANDS/UL (ref 149–390)
PMV BLD AUTO: 9.5 FL (ref 8.9–12.7)
POTASSIUM SERPL-SCNC: 4.2 MMOL/L (ref 3.5–5.3)
PROT SERPL-MCNC: 7.4 G/DL (ref 6.4–8.2)
RBC # BLD AUTO: 3.82 MILLION/UL (ref 3.81–5.12)
SODIUM SERPL-SCNC: 139 MMOL/L (ref 136–145)
TRIGL SERPL-MCNC: 55 MG/DL
TSH SERPL DL<=0.05 MIU/L-ACNC: 2.67 UIU/ML (ref 0.36–3.74)
WBC # BLD AUTO: 4.25 THOUSAND/UL (ref 4.31–10.16)

## 2021-08-07 PROCEDURE — 87389 HIV-1 AG W/HIV-1&-2 AB AG IA: CPT

## 2021-08-07 PROCEDURE — 80053 COMPREHEN METABOLIC PANEL: CPT

## 2021-08-07 PROCEDURE — 84443 ASSAY THYROID STIM HORMONE: CPT

## 2021-08-07 PROCEDURE — 85025 COMPLETE CBC W/AUTO DIFF WBC: CPT

## 2021-08-07 PROCEDURE — 80061 LIPID PANEL: CPT

## 2021-08-07 PROCEDURE — 86803 HEPATITIS C AB TEST: CPT

## 2021-08-07 PROCEDURE — 36415 COLL VENOUS BLD VENIPUNCTURE: CPT

## 2021-08-09 LAB — HIV 1+2 AB+HIV1 P24 AG SERPL QL IA: NORMAL

## 2021-08-15 ENCOUNTER — APPOINTMENT (OUTPATIENT)
Dept: LAB | Facility: CLINIC | Age: 41
End: 2021-08-15

## 2021-08-15 DIAGNOSIS — Z00.8 HEALTH EXAMINATION IN POPULATION SURVEY: ICD-10-CM

## 2021-08-15 LAB
CHOLEST SERPL-MCNC: 171 MG/DL (ref 50–200)
EST. AVERAGE GLUCOSE BLD GHB EST-MCNC: 103 MG/DL
HBA1C MFR BLD: 5.2 %
HDLC SERPL-MCNC: 65 MG/DL
LDLC SERPL CALC-MCNC: 93 MG/DL (ref 0–100)
NONHDLC SERPL-MCNC: 106 MG/DL
TRIGL SERPL-MCNC: 64 MG/DL

## 2021-08-15 PROCEDURE — 36415 COLL VENOUS BLD VENIPUNCTURE: CPT

## 2021-08-15 PROCEDURE — 80061 LIPID PANEL: CPT

## 2021-08-15 PROCEDURE — 83036 HEMOGLOBIN GLYCOSYLATED A1C: CPT

## 2021-08-16 DIAGNOSIS — K50.00 CROHN'S DISEASE INVOLVING TERMINAL ILEUM (HCC): ICD-10-CM

## 2021-08-17 RX ORDER — AZATHIOPRINE 50 MG/1
50 TABLET ORAL DAILY
Qty: 90 TABLET | Refills: 1 | Status: SHIPPED | OUTPATIENT
Start: 2021-08-17 | End: 2021-10-06 | Stop reason: SDUPTHER

## 2021-09-14 ENCOUNTER — OFFICE VISIT (OUTPATIENT)
Dept: FAMILY MEDICINE CLINIC | Facility: CLINIC | Age: 41
End: 2021-09-14

## 2021-09-14 VITALS — TEMPERATURE: 98.2 F

## 2021-09-14 DIAGNOSIS — M99.03 SOMATIC DYSFUNCTION OF LUMBAR REGION: ICD-10-CM

## 2021-09-14 DIAGNOSIS — M54.2 POSTERIOR NECK PAIN: ICD-10-CM

## 2021-09-14 DIAGNOSIS — M99.01 SOMATIC DYSFUNCTION OF CERVICAL REGION: ICD-10-CM

## 2021-09-14 DIAGNOSIS — M54.50 ACUTE MIDLINE LOW BACK PAIN WITHOUT SCIATICA: Primary | ICD-10-CM

## 2021-09-14 DIAGNOSIS — M99.02 SOMATIC DYSFUNCTION OF THORACIC REGION: ICD-10-CM

## 2021-09-14 PROCEDURE — 99213 OFFICE O/P EST LOW 20 MIN: CPT | Performed by: FAMILY MEDICINE

## 2021-09-14 NOTE — PROGRESS NOTES
Assessment/Plan     This is a 39 y o  female who presents for OMT follow-up for:  1  Acute midline low back pain without sciatica     2  Posterior neck pain     3  Somatic dysfunction of cervical region     4  Somatic dysfunction of thoracic region     5  Somatic dysfunction of lumbar region         Plan:   1  Patient tolerated OMT well for the above problems,  advised patient to drink fluids and can use NSAID for soreness after treatment     2  OMT Follow up in 4 weeks  Hussein Campos is a 39 y o  female and is here for a OMT follow up  The patient reports she is doing well overall  After last visit she had some posterior neck pain but her back pain was improving and denies any sciatic like symptoms  She does yoga daily  No recent falls or injuries  Is the patient taking Pain medication? no  Has the patient completed physical therapy for this condition? no  Did Patient symptoms improve from last OMT appointment?  yes    The following portions of the patient's history were reviewed and updated as appropriate: allergies, current medications, past family history, past medical history, past social history, past surgical history and problem list     Review of Systems  Do you have pain that bothers you in your daily life? yes  Review of Systems    Objective     OMT Exam   Cervical: Somatic Dysfunction:  Tissue Texture Changes, Asymmetry  and Restriction  Severity: 2  Osteopathic Findings: Hypertonic paraspinal cervical muscle, tenderness C5  Treatment Method: ME, ST  Response: improved  Thoracic T5-9:   Somatic Dysfunction:  Tissue Texture Changes, Asymmetry  and Restriction  Severity :  2  Osteopathic Findings: Hypertonic paraspinal muscle  Treatment Method: ST and MFR  Response: improved  Thoracic T10-12:  Somatic Dysfunction:  Tissue Texture Changes, Asymmetry  and Restriction  Severity :  2  Osteopathic Findings: Hypertonic paraspinal muscles R>L  Treatment Method: ST and MFR  Response: improved  Lumbar:  Somatic Dysfunction:  Tissue Texture Changes, Asymmetry , Restriction and Tenderness  Severity :  3  Osteopathic Findings: Hypertonic paraspinal muscle R>L, tenderpoint at RL4  Treatment Method: ST, MFR and CS  Response: improved  Pelvis:  Somatic Dysfunction:  Tissue Texture Changes, Asymmetry , Restriction and Tenderness  Severity :  2  Osteopathic Findings: Left anterior rotated innominate  Treatment Method: ME  Response: improved           Abimael Dela Cruz DO PGY-3  [facility]

## 2021-09-14 NOTE — PATIENT INSTRUCTIONS
Lower Back Exercises   WHAT YOU NEED TO KNOW:   Lower back exercises help heal and strengthen your back muscles to prevent another injury  Ask your healthcare provider if you need to see a physical therapist for more advanced exercises  DISCHARGE INSTRUCTIONS:   Return to the emergency department if:   · You have severe pain that prevents you from moving  Contact your healthcare provider if:   · Your pain becomes worse  · You have new pain  · You have questions or concerns about your condition or care  Do lower back exercises safely:   · Do the exercises on a mat or firm surface  (not on a bed) to support your spine and prevent low back pain  · Move slowly and smoothly  Avoid fast or jerky motions  · Breathe normally  Do not hold your breath  · Stop if you feel pain  It is normal to feel some discomfort at first  Regular exercise will help decrease your discomfort over time  Lower back exercises: Your healthcare provider may recommend that you do back exercises 10 to 30 minutes each day  He may also recommend that you do exercises 1 to 3 times each day  Ask your healthcare provider which exercises are best for you and how often to do them  · Ankle pumps:  Lie on your back  Move your foot up (with your toes pointing toward your head)  Then, move your foot down (with your toes pointing away from you)  Repeat this exercise 10 times on each side  · Heel slides:  Lie on your back  Slowly bend one leg and then straighten it  Next, bend the other leg and then straighten it  Repeat 10 times on each side  · Pelvic tilt:  Lie on your back with your knees bent and feet flat on the floor  Place your arms in a relaxed position beside your body  Tighten the muscles of your abdomen and flatten your back against the floor  Hold for 5 seconds  Repeat 5 times  · Back stretch:  Lie on your back with your hands behind your head   Bend your knees and turn the lower half of your body to one side  Hold this position for 10 seconds  Repeat 3 times on each side  · Straight leg raises:  Lie on your back with one leg straight  Bend the other knee  Tighten your abdomen and then slowly lift the straight leg up about 6 to 12 inches off the floor  Hold for 1 to 5 seconds  Lower your leg slowly  Repeat 10 times on each leg  · Knee-to-chest:  Lie on your back with your knees bent and feet flat on the floor  Pull one of your knees toward your chest and hold it there for 5 seconds  Return your leg to the starting position  Lift the other knee toward your chest and hold for 5 seconds  Do this 5 times on each side  · Cat and camel:  Place your hands and knees on the floor  Arch your back upward toward the ceiling and lower your head  Round out your spine as much as you can  Hold for 5 seconds  Lift your head upward and push your chest downward toward the floor  Hold for 5 seconds  Do 3 sets or as directed  · Wall squats:  Stand with your back against a wall  Tighten the muscles of your abdomen  Slowly lower your body until your knees are bent at a 45 degree angle  Hold this position for 5 seconds  Slowly move back up to a standing position  Repeat 10 times  · Curl up:  Lie on your back with your knees bent and feet flat on the floor  Place your hands, palms down, underneath the curve in your lower back  Next, with your elbows on the floor, lift your shoulders and chest 2 to 3 inches  Keep your head in line with your shoulders  Hold this position for 5 seconds  When you can do this exercise without pain for 10 to 15 seconds, you may add a rotation  While your shoulders and chest are lifted off the ground, turn slightly to the left and hold  Repeat on the other side  · Bird dog:  Place your hands and knees on the floor  Keep your wrists directly below your shoulders and your knees directly below your hips  Pull your belly button in toward your spine   Do not flatten or arch your back  Tighten your abdominal muscles  Raise one arm straight out so that it is aligned with your head  Next, raise the leg opposite your arm  Hold this position for 15 seconds  Lower your arm and leg slowly and change sides  Do 5 sets  © Copyright [employer] 2021 Information is for End User's use only and may not be sold, redistributed or otherwise used for commercial purposes  All illustrations and images included in CareNotes® are the copyrighted property of [employer]  or [facility] Zahida Enciso   The above information is an  only  It is not intended as medical advice for individual conditions or treatments  Talk to your doctor, nurse or pharmacist before following any medical regimen to see if it is safe and effective for you

## 2021-10-06 ENCOUNTER — OFFICE VISIT (OUTPATIENT)
Dept: GASTROENTEROLOGY | Facility: AMBULARY SURGERY CENTER | Age: 41
End: 2021-10-06
Payer: COMMERCIAL

## 2021-10-06 VITALS
DIASTOLIC BLOOD PRESSURE: 62 MMHG | HEIGHT: 59 IN | BODY MASS INDEX: 23.79 KG/M2 | SYSTOLIC BLOOD PRESSURE: 110 MMHG | WEIGHT: 118 LBS

## 2021-10-06 DIAGNOSIS — K50.00 CROHN'S DISEASE INVOLVING TERMINAL ILEUM (HCC): Primary | ICD-10-CM

## 2021-10-06 PROCEDURE — 99213 OFFICE O/P EST LOW 20 MIN: CPT | Performed by: INTERNAL MEDICINE

## 2021-10-06 RX ORDER — SODIUM CHLORIDE 9 MG/ML
20 INJECTION, SOLUTION INTRAVENOUS ONCE
Status: CANCELLED | OUTPATIENT
Start: 2021-10-27

## 2021-10-06 RX ORDER — DIPHENHYDRAMINE HCL 25 MG
25 TABLET ORAL ONCE
Status: CANCELLED | OUTPATIENT
Start: 2021-10-27

## 2021-10-06 RX ORDER — AZATHIOPRINE 50 MG/1
50 TABLET ORAL DAILY
Qty: 90 TABLET | Refills: 1 | Status: SHIPPED | OUTPATIENT
Start: 2021-10-06 | End: 2022-05-24

## 2021-10-06 RX ORDER — ACETAMINOPHEN 325 MG/1
650 TABLET ORAL ONCE
Status: CANCELLED | OUTPATIENT
Start: 2021-10-27

## 2021-10-06 RX ORDER — METHYLPREDNISOLONE SODIUM SUCCINATE 40 MG/ML
40 INJECTION, POWDER, LYOPHILIZED, FOR SOLUTION INTRAMUSCULAR; INTRAVENOUS ONCE
Status: CANCELLED | OUTPATIENT
Start: 2021-10-27

## 2021-10-21 ENCOUNTER — OFFICE VISIT (OUTPATIENT)
Dept: FAMILY MEDICINE CLINIC | Facility: CLINIC | Age: 41
End: 2021-10-21

## 2021-10-21 VITALS — BODY MASS INDEX: 23.83 KG/M2 | WEIGHT: 118.2 LBS | TEMPERATURE: 98.4 F | HEIGHT: 59 IN

## 2021-10-21 DIAGNOSIS — M62.830 BACK MUSCLE SPASM: ICD-10-CM

## 2021-10-21 DIAGNOSIS — M54.9 UPPER BACK PAIN ON LEFT SIDE: Primary | ICD-10-CM

## 2021-10-21 DIAGNOSIS — M54.50 ACUTE LEFT-SIDED LOW BACK PAIN WITHOUT SCIATICA: ICD-10-CM

## 2021-10-21 DIAGNOSIS — M99.02 SOMATIC DYSFUNCTION OF THORACIC REGION: ICD-10-CM

## 2021-10-21 DIAGNOSIS — M99.01 SOMATIC DYSFUNCTION OF CERVICAL REGION: ICD-10-CM

## 2021-10-21 PROCEDURE — 99213 OFFICE O/P EST LOW 20 MIN: CPT | Performed by: FAMILY MEDICINE

## 2021-10-21 RX ORDER — CYCLOBENZAPRINE HCL 10 MG
10 TABLET ORAL 3 TIMES DAILY PRN
Qty: 21 TABLET | Refills: 0 | Status: SHIPPED | OUTPATIENT
Start: 2021-10-21 | End: 2022-08-03

## 2021-10-22 ENCOUNTER — TELEPHONE (OUTPATIENT)
Dept: GASTROENTEROLOGY | Facility: CLINIC | Age: 41
End: 2021-10-22

## 2021-11-15 ENCOUNTER — TELEPHONE (OUTPATIENT)
Dept: GASTROENTEROLOGY | Facility: AMBULARY SURGERY CENTER | Age: 41
End: 2021-11-15

## 2021-11-15 DIAGNOSIS — K64.8 INTERNAL HEMORRHOIDS WITH COMPLICATION: ICD-10-CM

## 2021-11-15 RX ORDER — HYDROCORTISONE ACETATE 25 MG/1
25 SUPPOSITORY RECTAL 2 TIMES DAILY
Qty: 12 SUPPOSITORY | Refills: 0 | Status: SHIPPED | OUTPATIENT
Start: 2021-11-15 | End: 2021-12-15 | Stop reason: SDUPTHER

## 2021-11-17 ENCOUNTER — IMMUNIZATIONS (OUTPATIENT)
Dept: FAMILY MEDICINE CLINIC | Facility: HOSPITAL | Age: 41
End: 2021-11-17

## 2021-11-17 DIAGNOSIS — Z23 ENCOUNTER FOR IMMUNIZATION: Primary | ICD-10-CM

## 2021-11-17 PROCEDURE — 91301 COVID-19 MODERNA VACC 0.5 ML: CPT

## 2021-11-17 PROCEDURE — 0011A COVID-19 MODERNA VACC 0.5 ML: CPT

## 2021-12-09 ENCOUNTER — OFFICE VISIT (OUTPATIENT)
Dept: FAMILY MEDICINE CLINIC | Facility: CLINIC | Age: 41
End: 2021-12-09

## 2021-12-09 VITALS
SYSTOLIC BLOOD PRESSURE: 118 MMHG | DIASTOLIC BLOOD PRESSURE: 84 MMHG | HEART RATE: 108 BPM | OXYGEN SATURATION: 98 % | BODY MASS INDEX: 24.48 KG/M2 | HEIGHT: 59 IN | TEMPERATURE: 96.2 F | RESPIRATION RATE: 18 BRPM | WEIGHT: 121.4 LBS

## 2021-12-09 DIAGNOSIS — F41.9 ANXIETY: Primary | ICD-10-CM

## 2021-12-09 PROCEDURE — 99214 OFFICE O/P EST MOD 30 MIN: CPT | Performed by: FAMILY MEDICINE

## 2021-12-09 RX ORDER — ESCITALOPRAM OXALATE 5 MG/1
5 TABLET ORAL DAILY
Qty: 90 TABLET | Refills: 3 | Status: SHIPPED | OUTPATIENT
Start: 2021-12-09 | End: 2022-02-08

## 2021-12-28 ENCOUNTER — OFFICE VISIT (OUTPATIENT)
Dept: FAMILY MEDICINE CLINIC | Facility: CLINIC | Age: 41
End: 2021-12-28

## 2021-12-28 VITALS
SYSTOLIC BLOOD PRESSURE: 130 MMHG | TEMPERATURE: 98.6 F | WEIGHT: 120 LBS | HEART RATE: 82 BPM | HEIGHT: 59 IN | RESPIRATION RATE: 16 BRPM | BODY MASS INDEX: 24.19 KG/M2 | OXYGEN SATURATION: 98 % | DIASTOLIC BLOOD PRESSURE: 92 MMHG

## 2021-12-28 DIAGNOSIS — F41.9 ANXIETY: Primary | ICD-10-CM

## 2021-12-28 PROCEDURE — 99214 OFFICE O/P EST MOD 30 MIN: CPT | Performed by: FAMILY MEDICINE

## 2022-01-13 ENCOUNTER — TELEPHONE (OUTPATIENT)
Dept: GASTROENTEROLOGY | Facility: AMBULARY SURGERY CENTER | Age: 42
End: 2022-01-13

## 2022-01-24 ENCOUNTER — OFFICE VISIT (OUTPATIENT)
Dept: FAMILY MEDICINE CLINIC | Facility: CLINIC | Age: 42
End: 2022-01-24

## 2022-01-24 VITALS
WEIGHT: 115 LBS | HEART RATE: 94 BPM | OXYGEN SATURATION: 100 % | BODY MASS INDEX: 23.23 KG/M2 | DIASTOLIC BLOOD PRESSURE: 80 MMHG | TEMPERATURE: 98.1 F | SYSTOLIC BLOOD PRESSURE: 126 MMHG

## 2022-01-24 DIAGNOSIS — F41.9 ANXIETY: Primary | ICD-10-CM

## 2022-01-24 PROCEDURE — 99214 OFFICE O/P EST MOD 30 MIN: CPT | Performed by: FAMILY MEDICINE

## 2022-01-24 NOTE — ASSESSMENT & PLAN NOTE
Mood was stable until yesterday due to son's emotional breakdown  Reports adherence to Lexapro 5 mg with significant improvement  Brief therapy provided at today's encounter by me  Agreeable with formal therapy  Has appointment with (Dr Lawrence Rothman) in March  Continue Lexapro 5 mg daily  Dr Lawrence Rothman and Dr Roscoe Pacheco provided emotional support to pt at today's encounter  Will refer to SW to facilitate psych referral for pt's son

## 2022-01-26 ENCOUNTER — TELEPHONE (OUTPATIENT)
Dept: PSYCHIATRY | Facility: CLINIC | Age: 42
End: 2022-01-26

## 2022-02-08 ENCOUNTER — OFFICE VISIT (OUTPATIENT)
Dept: FAMILY MEDICINE CLINIC | Facility: CLINIC | Age: 42
End: 2022-02-08

## 2022-02-08 VITALS
WEIGHT: 120.8 LBS | TEMPERATURE: 97.8 F | SYSTOLIC BLOOD PRESSURE: 113 MMHG | HEART RATE: 91 BPM | DIASTOLIC BLOOD PRESSURE: 78 MMHG | HEIGHT: 59 IN | OXYGEN SATURATION: 100 % | BODY MASS INDEX: 24.35 KG/M2 | RESPIRATION RATE: 18 BRPM

## 2022-02-08 DIAGNOSIS — F41.9 ANXIETY: ICD-10-CM

## 2022-02-08 PROCEDURE — 99213 OFFICE O/P EST LOW 20 MIN: CPT | Performed by: FAMILY MEDICINE

## 2022-02-08 RX ORDER — ESCITALOPRAM OXALATE 10 MG/1
10 TABLET ORAL DAILY
Qty: 30 TABLET | Refills: 1
Start: 2022-02-08 | End: 2022-03-25 | Stop reason: SDUPTHER

## 2022-02-08 NOTE — PROGRESS NOTES
Assessment/Plan:    Anxiety  Patient mood has improved since last visit 2 weeks ago  Patient is tolerating current dose of Lexapro, but endorses occasional racing thoughts and hot flashes that wake her at night  -PHQ in office score of 7, no prior PHQ seen in chart    Plan:  - Increase Lexapro dose from 5 mg to 10 mg; sent script to pharmacy  - Follow-up in 1 month (schedule on same day as [facility] appt with Dr Regine Purdy)       Diagnoses and all orders for this visit:    Anxiety  -     escitalopram (LEXAPRO) 10 mg tablet; Take 1 tablet (10 mg total) by mouth daily          Subjective:      Patient ID: Marleny Mccabe is a 39 y o  female  Presents for 2-week follow-up visit  Patient states that she is happy with the results of her medication and feels more calm  She is trying to get back into her regular routine, such as yoga, cardio exercises, and cooking in addition to 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  She has not felt overly anxious over the past 2 weeks and uses breathing exercises to deal with stress from her son's situation  She does endorse racing thoughts and hot flashes that occasionally wake her at night  She denies any depressive symptoms and suicidal thoughts  Pt's son was in the ER last Tuesday after expressing suicidal thoughts to his therapist and now is in inpatient treatment  She is saddened because her son has not improved since being hospitalized and may be discharged in the next few days  The following portions of the patient's history were reviewed and updated as appropriate: allergies, current medications, past family history, past medical history, past social history, past surgical history and problem list     Review of Systems   Constitutional: Negative for appetite change, chills and fever  Eyes: Negative for visual disturbance  Respiratory: Negative for shortness of breath      Gastrointestinal: Negative for abdominal distention, abdominal pain, constipation, diarrhea, nausea and vomiting  Musculoskeletal: Negative for arthralgias and myalgias  Neurological: Negative for light-headedness, numbness and headaches  Psychiatric/Behavioral: Positive for sleep disturbance  Negative for decreased concentration, dysphoric mood, self-injury and suicidal ideas  The patient is not nervous/anxious  Objective:      /78 (BP Location: Left arm, Patient Position: Sitting, Cuff Size: Standard)   Pulse 91   Temp 97 8 °F (36 6 °C) (Temporal)   Resp 18   Ht 4' 11" (1 499 m)   Wt 54 8 kg (120 lb 12 8 oz)   SpO2 100%   BMI 24 40 kg/m²          Physical Exam  Vitals reviewed  Constitutional:       General: She is not in acute distress  Appearance: She is not ill-appearing or toxic-appearing  HENT:      Head: Normocephalic  Mouth/Throat:      Mouth: Mucous membranes are moist       Pharynx: Oropharynx is clear  Eyes:      General: No scleral icterus  Extraocular Movements: Extraocular movements intact  Conjunctiva/sclera: Conjunctivae normal       Pupils: Pupils are equal, round, and reactive to light  Cardiovascular:      Rate and Rhythm: Normal rate and regular rhythm  Pulses: Normal pulses  Heart sounds: Normal heart sounds  Pulmonary:      Effort: Pulmonary effort is normal  No respiratory distress  Breath sounds: Normal breath sounds  Abdominal:      General: Abdomen is flat  Bowel sounds are normal  There is no distension  Palpations: Abdomen is soft  Tenderness: There is no abdominal tenderness  Musculoskeletal:         General: No swelling  Skin:     General: Skin is warm and dry  Coloration: Skin is not jaundiced  Neurological:      General: No focal deficit present  Mental Status: She is alert and oriented to person, place, and time  Mental status is at baseline     Psychiatric:         Attention and Perception: Attention and perception normal          Mood and Affect: Mood and affect normal  Speech: Speech normal          Behavior: Behavior normal  Behavior is cooperative  Thought Content:  Thought content normal          Judgment: Judgment normal

## 2022-02-08 NOTE — ASSESSMENT & PLAN NOTE
Patient mood has improved since last visit 2 weeks ago  Patient is tolerating current dose of Lexapro, but endorses occasional racing thoughts and hot flashes that wake her at night       Plan:  - Increase Lexapro dose from 5 mg to 10 mg; sent script to pharmacy  - Follow-up in 1 month (schedule on same day as [facility] appt with Dr Franky Bagley)

## 2022-02-08 NOTE — PATIENT INSTRUCTIONS
Anxiety   WHAT YOU NEED TO KNOW:   Anxiety is a condition that causes you to feel extremely worried or nervous  The feelings are so strong that they can cause problems with your daily activities or sleep  Anxiety may be triggered by something you fear, or it may happen without a cause  Family or work stress, smoking, caffeine, and alcohol can increase your risk for anxiety  Certain medicines or health conditions can also increase your risk  Anxiety can become a long-term condition if it is not managed or treated  DISCHARGE INSTRUCTIONS:   Call your local emergency number (911 in the [address]) if:   · You have chest pain, tightness, or heaviness that may spread to your shoulders, arms, jaw, neck, or back  · You feel like hurting yourself or someone else  Call your doctor if:   · Your symptoms get worse or do not get better with treatment  · Your anxiety keeps you from doing your regular daily activities  · You have new symptoms since your last visit  · You have questions or concerns about your condition or care  Medicines:   · Medicines  may be given to help you feel more calm and relaxed, and decrease your symptoms  · Take your medicine as directed  Contact your healthcare provider if you think your medicine is not helping or if you have side effects  Tell him of her if you are allergic to any medicine  Keep a list of the medicines, vitamins, and herbs you take  Include the amounts, and when and why you take them  Bring the list or the pill bottles to follow-up visits  Carry your medicine list with you in case of an emergency  Manage anxiety:   · Talk to someone about your anxiety  Your healthcare provider may suggest counseling  Cognitive behavioral therapy can help you understand and change how you react to events that trigger your symptoms  You might feel more comfortable talking with a friend or family member about your anxiety   Choose someone you know will be supportive and encouraging  · Find ways to relax  Activities such as exercise, meditation, or listening to music can help you relax  Spend time with friends, or do things you enjoy  · Practice deep breathing  Deep breathing can help you relax when you feel anxious  Focus on taking slow, deep breaths several times a day, or during an anxiety attack  Breathe in through your nose and out through your mouth  · Create a regular sleep routine  Regular sleep can help you feel calmer during the day  Go to sleep and wake up at the same times every day  Do not watch television or use the computer right before bed  Your room should be comfortable, dark, and quiet  · Eat a variety of healthy foods  Healthy foods include fruits, vegetables, low-fat dairy products, lean meats, fish, whole-grain breads, and cooked beans  Healthy foods can help you feel less anxious and have more energy  · Exercise regularly  Exercise can increase your energy level  Exercise may also lift your mood and help you sleep better  Your healthcare provider can help you create an exercise plan  · Do not smoke  Nicotine and other chemicals in cigarettes and cigars can increase anxiety  Ask your healthcare provider for information if you currently smoke and need help to quit  E-cigarettes or smokeless tobacco still contain nicotine  Talk to your healthcare provider before you use these products  · Do not have caffeine  Caffeine can make your symptoms worse  Do not have foods or drinks that are meant to increase your energy level  · Limit or do not drink alcohol  Ask your healthcare provider if alcohol is safe for you  You may not be able to drink alcohol if you take certain anxiety or depression medicines  Limit alcohol to 1 drink per day if you are a woman  Limit alcohol to 2 drinks per day if you are a man  A drink of alcohol is 12 ounces of beer, 5 ounces of wine, or 1½ ounces of liquor  · Do not use drugs    Drugs can make your anxiety worse  It can also make anxiety hard to manage  Talk to your healthcare provider if you use drugs and want help to quit  Follow up with your doctor within 2 weeks or as directed:  Write down your questions so you remember to ask them during your visits  © Copyright [employer] 2021 Information is for End User's use only and may not be sold, redistributed or otherwise used for commercial purposes  All illustrations and images included in CareNotes® are the copyrighted property of [employer]  or [employer]   The above information is an  only  It is not intended as medical advice for individual conditions or treatments  Talk to your doctor, nurse or pharmacist before following any medical regimen to see if it is safe and effective for you

## 2022-02-09 ENCOUNTER — APPOINTMENT (OUTPATIENT)
Dept: LAB | Facility: HOSPITAL | Age: 42
End: 2022-02-09
Payer: COMMERCIAL

## 2022-02-09 DIAGNOSIS — K50.00 CROHN'S DISEASE INVOLVING TERMINAL ILEUM (HCC): ICD-10-CM

## 2022-02-09 LAB
ALBUMIN SERPL BCP-MCNC: 4.2 G/DL (ref 3.5–5)
ALP SERPL-CCNC: 83 U/L (ref 46–116)
ALT SERPL W P-5'-P-CCNC: 24 U/L (ref 12–78)
ANION GAP SERPL CALCULATED.3IONS-SCNC: 8 MMOL/L (ref 4–13)
AST SERPL W P-5'-P-CCNC: 15 U/L (ref 5–45)
BASOPHILS # BLD AUTO: 0.03 THOUSANDS/ΜL (ref 0–0.1)
BASOPHILS NFR BLD AUTO: 1 % (ref 0–1)
BILIRUB DIRECT SERPL-MCNC: 0.14 MG/DL (ref 0–0.2)
BILIRUB SERPL-MCNC: 0.71 MG/DL (ref 0.2–1)
BUN SERPL-MCNC: 13 MG/DL (ref 5–25)
CALCIUM SERPL-MCNC: 8.8 MG/DL (ref 8.3–10.1)
CHLORIDE SERPL-SCNC: 104 MMOL/L (ref 100–108)
CO2 SERPL-SCNC: 27 MMOL/L (ref 21–32)
CREAT SERPL-MCNC: 0.69 MG/DL (ref 0.6–1.3)
EOSINOPHIL # BLD AUTO: 0.08 THOUSAND/ΜL (ref 0–0.61)
EOSINOPHIL NFR BLD AUTO: 1 % (ref 0–6)
ERYTHROCYTE [DISTWIDTH] IN BLOOD BY AUTOMATED COUNT: 12 % (ref 11.6–15.1)
GFR SERPL CREATININE-BSD FRML MDRD: 108 ML/MIN/1.73SQ M
GLUCOSE SERPL-MCNC: 117 MG/DL (ref 65–140)
HBV SURFACE AB SER-ACNC: 5.4 MIU/ML
HBV SURFACE AG SER QL: NORMAL
HCT VFR BLD AUTO: 36.4 % (ref 34.8–46.1)
HGB BLD-MCNC: 12 G/DL (ref 11.5–15.4)
IMM GRANULOCYTES # BLD AUTO: 0.01 THOUSAND/UL (ref 0–0.2)
IMM GRANULOCYTES NFR BLD AUTO: 0 % (ref 0–2)
LIPASE SERPL-CCNC: 267 U/L (ref 73–393)
LYMPHOCYTES # BLD AUTO: 1.49 THOUSANDS/ΜL (ref 0.6–4.47)
LYMPHOCYTES NFR BLD AUTO: 25 % (ref 14–44)
MCH RBC QN AUTO: 30.1 PG (ref 26.8–34.3)
MCHC RBC AUTO-ENTMCNC: 33 G/DL (ref 31.4–37.4)
MCV RBC AUTO: 91 FL (ref 82–98)
MONOCYTES # BLD AUTO: 0.32 THOUSAND/ΜL (ref 0.17–1.22)
MONOCYTES NFR BLD AUTO: 5 % (ref 4–12)
NEUTROPHILS # BLD AUTO: 4.14 THOUSANDS/ΜL (ref 1.85–7.62)
NEUTS SEG NFR BLD AUTO: 68 % (ref 43–75)
NRBC BLD AUTO-RTO: 0 /100 WBCS
PLATELET # BLD AUTO: 318 THOUSANDS/UL (ref 149–390)
PMV BLD AUTO: 10 FL (ref 8.9–12.7)
POTASSIUM SERPL-SCNC: 3.7 MMOL/L (ref 3.5–5.3)
PROT SERPL-MCNC: 8.2 G/DL (ref 6.4–8.2)
RBC # BLD AUTO: 3.99 MILLION/UL (ref 3.81–5.12)
SODIUM SERPL-SCNC: 139 MMOL/L (ref 136–145)
WBC # BLD AUTO: 6.07 THOUSAND/UL (ref 4.31–10.16)

## 2022-02-09 PROCEDURE — 80048 BASIC METABOLIC PNL TOTAL CA: CPT | Performed by: INTERNAL MEDICINE

## 2022-02-09 PROCEDURE — 83690 ASSAY OF LIPASE: CPT | Performed by: INTERNAL MEDICINE

## 2022-02-09 PROCEDURE — 86480 TB TEST CELL IMMUN MEASURE: CPT

## 2022-02-09 PROCEDURE — 87340 HEPATITIS B SURFACE AG IA: CPT

## 2022-02-09 PROCEDURE — 36415 COLL VENOUS BLD VENIPUNCTURE: CPT | Performed by: INTERNAL MEDICINE

## 2022-02-09 PROCEDURE — 85025 COMPLETE CBC W/AUTO DIFF WBC: CPT | Performed by: INTERNAL MEDICINE

## 2022-02-09 PROCEDURE — 86706 HEP B SURFACE ANTIBODY: CPT

## 2022-02-09 PROCEDURE — 80076 HEPATIC FUNCTION PANEL: CPT | Performed by: INTERNAL MEDICINE

## 2022-02-10 LAB
GAMMA INTERFERON BACKGROUND BLD IA-ACNC: 0.25 IU/ML
M TB IFN-G BLD-IMP: POSITIVE
M TB IFN-G CD4+ BCKGRND COR BLD-ACNC: 0.98 IU/ML
M TB IFN-G CD4+ BCKGRND COR BLD-ACNC: 1.2 IU/ML
MITOGEN IGNF BCKGRD COR BLD-ACNC: 3.59 IU/ML

## 2022-02-11 ENCOUNTER — TELEPHONE (OUTPATIENT)
Dept: GASTROENTEROLOGY | Facility: CLINIC | Age: 42
End: 2022-02-11

## 2022-02-11 NOTE — TELEPHONE ENCOUNTER
Cannot start treatment until this is sorted out- would discuss with Dr Willian Slaughter how he would like to proceed, may need ID evaluation, chest x-ray should be considered and/or PPD test

## 2022-02-11 NOTE — TELEPHONE ENCOUNTER
Called and spoke with patient, advised her I will reach back out to her by the end of the day with Dr Winslow Hopes recommendations but to hold off on humira for now  Patient states she was born in [facility] and has tested positive for TB in the past and has worked with ID in treating the TB while also being on treatment for crohn's however she states [place] was [place]  I advised we will likely have to work with ID again but will defer to Dr Elissa Arciniega for plan   She is aware to not start humira until she has heard back from us

## 2022-02-11 NOTE — TELEPHONE ENCOUNTER
Patients GI provider:  Dr Jayden Barraza    Number to return call: [phone]    Reason for call: Pt calling requesting to speak with a nurse as soon as possible stated will start Humira tomorrow and would like to know if she should stop taking Imuran? Pt also stated tested positive for TB  Pt can be reached at the num above      Scheduled procedure/appointment date if applicable: N/A

## 2022-02-11 NOTE — TELEPHONE ENCOUNTER
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

## 2022-03-17 ENCOUNTER — CLINICAL SUPPORT (OUTPATIENT)
Dept: FAMILY MEDICINE CLINIC | Facility: CLINIC | Age: 42
End: 2022-03-17

## 2022-03-17 ENCOUNTER — OFFICE VISIT (OUTPATIENT)
Dept: FAMILY MEDICINE CLINIC | Facility: CLINIC | Age: 42
End: 2022-03-17

## 2022-03-17 VITALS
RESPIRATION RATE: 18 BRPM | SYSTOLIC BLOOD PRESSURE: 126 MMHG | TEMPERATURE: 98.1 F | HEIGHT: 59 IN | BODY MASS INDEX: 24.4 KG/M2 | DIASTOLIC BLOOD PRESSURE: 98 MMHG | HEART RATE: 80 BPM | OXYGEN SATURATION: 99 %

## 2022-03-17 DIAGNOSIS — F41.9 ANXIETY: Primary | ICD-10-CM

## 2022-03-17 PROCEDURE — 99214 OFFICE O/P EST MOD 30 MIN: CPT | Performed by: FAMILY MEDICINE

## 2022-03-17 NOTE — PROGRESS NOTES
Assessment/Plan:    Anxiety  Moderately Controlled  Reports that the Lexapro 5mg is helping her adequately control mood symptoms and she is happy with the dose  Never started taking Lexapro 10 mg   Life stressors with son's medical situation are still present and make it difficult for her to relax but she is hopefull that her kid's summer trip will give her a break and be good for them too    - Continue Lexapro 5mg  - Patient enjoyed her time with Dr Regine Purdy and will make another appt in the future   - Counseled patient on adequate coping strategies like exercise, deep breathing, and journaling and had an open conversation on how she can best be improve her family dynamics  Diagnoses and all orders for this visit:    Anxiety          Subjective:      Patient ID: Marleny Mccabe is a 39 y o  female  Patient presents today for follow-up of anxiety  Patient states that she still has episodes where she wakes up in the middle of the night wondering where her son is and how he is doing, given his previous psychiatric history and current life events  She states she wakes up around 4 or 5am but feels well rested and energetic for the day  Last night, she could not sleep at all and around 12pm, decided to take melatonin which did not completely help her sleep  She endorses having a low mood and was tearful during visit with Dr Regine Purdy (behavioral health)  When asked about diet she states she is trying to lose weight but endorses eating a good portion of fruits, veggies, and protein  She tries to exercise 30-45 minutes a day by running or doing yoga  Also has coping strategies like deep breathing  The majority of the conversation was talking about her current stressors at home with her son and daughter who sometimes hurt her with their words and she worries about her son's school performance extensively  She "feels like a failure" that she can't help her son move in the right direction with his life   She says she tries to spend as much time with him as possible but sometimes gets too overwhelmed and has to be alone  She does not have a good support system besides her co-workers and  since [place]  She is hopeful that this summer can help her relax when the kids are planning to visit their aunt's family in [place] and become more bonded with the family  The following portions of the patient's history were reviewed and updated as appropriate: allergies, current medications, past family history, past medical history, past social history and problem list     Review of Systems   Constitutional: Positive for activity change (recently increased exercise to try to lose weight)  Negative for appetite change, chills and fatigue  HENT: Negative for congestion, sneezing, sore throat and voice change  Eyes: Negative for visual disturbance  Respiratory: Negative for shortness of breath  Cardiovascular: Negative for chest pain  Gastrointestinal: Negative for abdominal distention and abdominal pain  Skin: Negative for color change  Neurological: Negative for dizziness and headaches  Psychiatric/Behavioral: Positive for dysphoric mood and sleep disturbance  Negative for decreased concentration and suicidal ideas  Objective:    /98 (BP Location: Left arm, Patient Position: Sitting, Cuff Size: Standard)   Pulse 80   Temp 98 1 °F (36 7 °C) (Temporal)   Resp 18   Ht 4' 11" (1 499 m)   SpO2 99%   BMI 24 40 kg/m²        Physical Exam  Constitutional:       Appearance: Normal appearance  HENT:      Head: Normocephalic and atraumatic  Mouth/Throat:      Mouth: Mucous membranes are moist       Pharynx: Oropharynx is clear  Eyes:      Extraocular Movements: Extraocular movements intact  Pupils: Pupils are equal, round, and reactive to light  Cardiovascular:      Rate and Rhythm: Normal rate and regular rhythm  Pulses: Normal pulses        Heart sounds: Normal heart sounds  Pulmonary:      Effort: Pulmonary effort is normal       Breath sounds: Normal breath sounds  Abdominal:      General: Abdomen is flat  Palpations: Abdomen is soft  Skin:     General: Skin is warm and dry  Neurological:      Mental Status: She is alert and oriented to person, place, and time  Psychiatric:         Mood and Affect: Mood normal          Behavior: Behavior normal          Thought Content:  Thought content normal          Judgment: Judgment normal

## 2022-03-17 NOTE — ASSESSMENT & PLAN NOTE
Moderately Controlled  Reports that the Lexapro 5mg is helping her adequately control mood symptoms and she is happy with the dose  Never started taking Lexapro 10 mg   Life stressors with son's medical situation are still present and make it difficult for her to relax but she is hopefull that her kid's summer trip will give her a break and be good for them too    - Continue Lexapro 5mg  - Patient enjoyed her time with Dr Noa Lee and will make another appt in the future   - Counseled patient on adequate coping strategies like exercise, deep breathing, and journaling and had an open conversation on how she can best be improve her family dynamics

## 2022-03-20 NOTE — PROGRESS NOTES
Data    This was my first official appointment with Neeraj  She has been referred to me due to new diagnosis of depression and anxiety  She has not seen a therapist in the past  Her children are current in therapy  She reports stressors related to family relationships that are currently affecting her mood  Work- she reported no problems at work  Brief genogram  Parents   Parents are still in [facility]  Her parents did not approver her marriage  Per Neeraj, her  is from a lower cast than her  So, the families are not very close  Parents do not have a history of mental health or substance abuse  In laws  She used to be close to her father in law  He passed away years ago from a heart attack  She explained that her 's family blames her for it  She has a distant relationship with his family  His family also lives in [facility]       She describes  as having a temper  She denies hx or current IPV problems  She states that  may be depressed  However, he does not believe in mental health  She is willing to come to therapy as a couple  I suggested to being her  in  Children  She has a boy and a girl  Both have been diagnosed with depression and anxiety  Son was hospitalized for suicide attempt  Family was brought together by therapist  Father was upset after sessions with family  Father feels that family blames him for current stressors  IP  Reported that tension in the household is placing unnecessary stressors on her, which, is contributing to her anxiety  She does not want to come home from work most days  No hx of previous mh or current substance abuse  Denies SI or HI  Needs help with coping skills and relationship attachment  Michel Mckeon is a 39years old woman wit no prrevious hx of mh or sub abuse  Family hx is positive for stressors and lack of support  She has no issues at work just at home  She seems to be in the pre contemplation stage of change   She is willing to work on relationship with  to reduce tension and improve her health  I recommended couple therapy  She will bring  next session to jesus his interest and define goals  Karrie Nissen

## 2022-03-21 DIAGNOSIS — K50.00 CROHN'S DISEASE INVOLVING TERMINAL ILEUM (HCC): Primary | ICD-10-CM

## 2022-03-25 DIAGNOSIS — F41.9 ANXIETY: ICD-10-CM

## 2022-03-25 RX ORDER — ESCITALOPRAM OXALATE 10 MG/1
10 TABLET ORAL DAILY
Qty: 90 TABLET | Refills: 2
Start: 2022-03-25 | End: 2022-03-28 | Stop reason: SDUPTHER

## 2022-03-27 RX ORDER — ADALIMUMAB 40MG/0.4ML
KIT SUBCUTANEOUS
Qty: 2 EACH | Refills: 10 | Status: SHIPPED | OUTPATIENT
Start: 2022-03-27

## 2022-03-28 DIAGNOSIS — F41.9 ANXIETY: ICD-10-CM

## 2022-03-28 RX ORDER — ESCITALOPRAM OXALATE 10 MG/1
10 TABLET ORAL DAILY
Qty: 90 TABLET | Refills: 2
Start: 2022-03-28 | End: 2022-04-05 | Stop reason: SDUPTHER

## 2022-03-28 NOTE — TELEPHONE ENCOUNTER
Patient needs refill    escitalopram (LEXAPRO) 10 mg tablet         Patient can be reached at [phone]

## 2022-03-28 NOTE — TELEPHONE ENCOUNTER
Please advise if refill is appropriate for patient  Patient was last seen on 03/17/2022 and reported to provider that she never started taking Lexapro 10mg  Patient also just had medication refilled on 03/25/2022  Thanks!

## 2022-03-29 ENCOUNTER — TELEPHONE (OUTPATIENT)
Dept: GASTROENTEROLOGY | Facility: CLINIC | Age: 42
End: 2022-03-29

## 2022-03-29 NOTE — TELEPHONE ENCOUNTER
----- Message from Miguelina Navas RN sent at 3/29/2022  1:59 PM EDT -----    ----- Message -----  From: Jaison Snyder MD  Sent: 3/27/2022   8:22 AM EDT  To: Danitza Clark, Gastroenterology Nurse    Can you please let me know what the status of her Humira prescription  I keep getting error message from the pharmacy

## 2022-03-29 NOTE — TELEPHONE ENCOUNTER
Called and spoke to Klarissa Conroy with the [facility] pharmacy [phone] he stated they did received the script they will be reaching out her her within the next few weeks to schedule the delivery    Everything is processing properly there are no issues with with script

## 2022-03-30 ENCOUNTER — OFFICE VISIT (OUTPATIENT)
Dept: PLASTIC SURGERY | Facility: CLINIC | Age: 42
End: 2022-03-30
Payer: COMMERCIAL

## 2022-03-30 VITALS — TEMPERATURE: 98.3 F | HEART RATE: 80 BPM | SYSTOLIC BLOOD PRESSURE: 124 MMHG | DIASTOLIC BLOOD PRESSURE: 79 MMHG

## 2022-03-30 DIAGNOSIS — Z87.828 HISTORY OF MOTOR VEHICLE ACCIDENT: Primary | ICD-10-CM

## 2022-03-30 DIAGNOSIS — L90.5 SCAR OF FACE: ICD-10-CM

## 2022-03-30 PROCEDURE — 99204 OFFICE O/P NEW MOD 45 MIN: CPT | Performed by: PHYSICIAN ASSISTANT
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
## 2022-04-05 RX ORDER — ESCITALOPRAM OXALATE 10 MG/1
10 TABLET ORAL DAILY
Qty: 90 TABLET | Refills: 2
Start: 2022-04-05 | End: 2022-04-14 | Stop reason: SDUPTHER

## 2022-04-05 NOTE — TELEPHONE ENCOUNTER
Pt stated she has been calling for 3 weeks  For Lexapro  Med was approved but never sent out to Pharmacy  She also stated that she ran out and is taking her husbands Lexapro now

## 2022-04-14 ENCOUNTER — OFFICE VISIT (OUTPATIENT)
Dept: FAMILY MEDICINE CLINIC | Facility: CLINIC | Age: 42
End: 2022-04-14

## 2022-04-14 VITALS
SYSTOLIC BLOOD PRESSURE: 105 MMHG | TEMPERATURE: 98.3 F | BODY MASS INDEX: 23.79 KG/M2 | HEIGHT: 59 IN | HEART RATE: 74 BPM | OXYGEN SATURATION: 99 % | DIASTOLIC BLOOD PRESSURE: 55 MMHG | WEIGHT: 118 LBS | RESPIRATION RATE: 16 BRPM

## 2022-04-14 DIAGNOSIS — F41.9 ANXIETY: ICD-10-CM

## 2022-04-14 PROCEDURE — 99214 OFFICE O/P EST MOD 30 MIN: CPT | Performed by: FAMILY MEDICINE

## 2022-04-14 RX ORDER — ESCITALOPRAM OXALATE 10 MG/1
10 TABLET ORAL DAILY
Qty: 90 TABLET | Refills: 2 | Status: SHIPPED | OUTPATIENT
Start: 2022-04-14

## 2022-04-14 NOTE — PROGRESS NOTES
Assessment/Plan:       Problem List Items Addressed This Visit        Other    Anxiety     Moderately Controlled today, however mood seems to be life stressor dependent    Compliant with Lexapro 10 mg daily, run out of medication for a few days, refill provided  - Continue Lexapro 10 mg daily  - cause therapy with Dr Diandra Shrestha  with next appointment in June  - Counseled patient on adequate coping strategies like exercise,having open conversations to improve her family dynamics  Relevant Medications    escitalopram (LEXAPRO) 10 mg tablet            Subjective:      Patient ID: Mick Greer is a 39 y o  female  HPI  Pt here for follow up  Reports " I sometimes give up because of the things, I cant control"  Pt's son is sometimes on and off, does not take his meds  He threatens to harm himself  He has " his good days and his bad days"  Dtr is also stressed with school and therapy and meds are helping  Family therapy did help in the past, now every family member has therapy separately  Has appt with Dr Diandra Shrestha in June  Pt is looking forward to a trip to [place] with dtr and possibly her son can come along too   Takes Humira for chron's  Stable and follows with GI   Did follow up with plastic sx for possible scar revision  Awaiting clearance with insurance  The following portions of the patient's history were reviewed and updated as appropriate:   She  has a past medical history of Anxiety, Autoimmune disorder ([address] ), Crohn disease ([address] ), and Ear infection    She   Patient Active Problem List    Diagnosis Date Noted    Anxiety 12/09/2021    Lower back pain 04/30/2021    Epigastric pain 01/15/2021    Menses painful 01/05/2021    Vaginal dryness 01/05/2021    Abnormal uterine bleeding (AUB) 11/04/2020    History of female sterilization 11/04/2020    Mixed conductive and sensorineural hearing loss of right ear with restricted hearing of left ear 04/03/2019    History of acute otitis media 2019    Viral upper respiratory tract infection 10/02/2018    Hearing loss of right ear 08/10/2018    Crohn's disease involving terminal ileum ([address] ) 2018    Internal hemorrhoids with complication      She  has a past surgical history that includes Colonoscopy;  section; and Upper gastrointestinal endoscopy  Her family history includes Hypertension in her father and mother; Obesity in her father and mother  She  reports that she has never smoked  She has never used smokeless tobacco  She reports current alcohol use of about 2 0 standard drinks of alcohol per week  She reports that she does not use drugs  Current Outpatient Medications   Medication Sig Dispense Refill    ciprofloxacin-dexamethasone (CIPRODEX) otic suspension Administer 4 drops to the right ear 2 (two) times a day 7 5 mL 1    cyclobenzaprine (FLEXERIL) 10 mg tablet Take 1 tablet (10 mg total) by mouth 3 (three) times a day as needed for muscle spasms 21 tablet 0    dicyclomine (BENTYL) 10 mg capsule Take 1 capsule (10 mg total) by mouth 3 (three) times a day as needed (abd pain) 90 capsule 3    escitalopram (LEXAPRO) 10 mg tablet Take 1 tablet (10 mg total) by mouth daily 90 tablet 2    Humira Pen 40 MG/0 4ML PNKT Inject 1 pen (40 mg) under the skin once every 14 days   2 each 10    hydrocortisone (ANUSOL-HC) 2 5 % rectal cream Apply topically 2 (two) times a day Apply topically 2 times daily for 7-10 days 28 g 0    hydrocortisone (ANUSOL-HC) 25 mg suppository Insert 1 suppository (25 mg total) into the rectum 2 (two) times a day 30 suppository 3    lidocaine (LIDODERM) 5 % Apply 1 patch topically daily Remove & Discard patch within 12 hours or as directed by MD 10 patch 0    Multiple Vitamins-Minerals (MULTIVITAMIN ADULT PO) Daily      oxyCODONE (OXY-IR) 5 MG capsule Take 1 capsule (5 mg total) by mouth every 6 (six) hours as needed for severe pain for up to 10 doses Can substitute oxycodone tablets for capsulesMax Daily Amount: 20 mg 10 capsule 0    PREBIOTIC PRODUCT PO Take by mouth      Probiotic Product (PROBIOTIC-10 PO) Probiotic   1 tab daily      Tranexamic Acid 650 MG TABS Take 2 tablets (1,300 mg total) by mouth 3 (three) times a day 5 tablet 4    amoxicillin (AMOXIL) 500 mg capsule every 12 (twelve) hours (Patient not taking: Reported on 10/6/2021 )      azaTHIOprine (IMURAN) 50 mg tablet Take 1 tablet (50 mg total) by mouth daily (Patient not taking: Reported on 4/14/2022 ) 90 tablet 1    ciprofloxacin-dexamethasone (CIPRODEX) otic suspension Administer 4 drops to the right ear 2 (two) times a day (Patient not taking: Reported on 2/8/2022 ) 7 5 mL 0    pantoprazole (PROTONIX) 40 mg tablet Take 1 tablet (40 mg total) by mouth daily 30 tablet 2     No current facility-administered medications for this visit  Current Outpatient Medications on File Prior to Visit   Medication Sig    ciprofloxacin-dexamethasone (CIPRODEX) otic suspension Administer 4 drops to the right ear 2 (two) times a day    cyclobenzaprine (FLEXERIL) 10 mg tablet Take 1 tablet (10 mg total) by mouth 3 (three) times a day as needed for muscle spasms    dicyclomine (BENTYL) 10 mg capsule Take 1 capsule (10 mg total) by mouth 3 (three) times a day as needed (abd pain)    Humira Pen 40 MG/0 4ML PNKT Inject 1 pen (40 mg) under the skin once every 14 days      hydrocortisone (ANUSOL-HC) 2 5 % rectal cream Apply topically 2 (two) times a day Apply topically 2 times daily for 7-10 days    hydrocortisone (ANUSOL-HC) 25 mg suppository Insert 1 suppository (25 mg total) into the rectum 2 (two) times a day    lidocaine (LIDODERM) 5 % Apply 1 patch topically daily Remove & Discard patch within 12 hours or as directed by MD    Multiple Vitamins-Minerals (MULTIVITAMIN ADULT PO) Daily    oxyCODONE (OXY-IR) 5 MG capsule Take 1 capsule (5 mg total) by mouth every 6 (six) hours as needed for severe pain for up to 10 doses Can substitute oxycodone tablets for capsulesMax Daily Amount: 20 mg    PREBIOTIC PRODUCT PO Take by mouth    Probiotic Product (PROBIOTIC-10 PO) Probiotic   1 tab daily    Tranexamic Acid 650 MG TABS Take 2 tablets (1,300 mg total) by mouth 3 (three) times a day    [DISCONTINUED] escitalopram (LEXAPRO) 10 mg tablet Take 1 tablet (10 mg total) by mouth daily    amoxicillin (AMOXIL) 500 mg capsule every 12 (twelve) hours (Patient not taking: Reported on 10/6/2021 )    azaTHIOprine (IMURAN) 50 mg tablet Take 1 tablet (50 mg total) by mouth daily (Patient not taking: Reported on 4/14/2022 )    ciprofloxacin-dexamethasone (CIPRODEX) otic suspension Administer 4 drops to the right ear 2 (two) times a day (Patient not taking: Reported on 2/8/2022 )    pantoprazole (PROTONIX) 40 mg tablet Take 1 tablet (40 mg total) by mouth daily     No current facility-administered medications on file prior to visit  She has No Known Allergies       Review of Systems   Psychiatric/Behavioral: Positive for dysphoric mood  Objective:      /55 (BP Location: Left arm, Patient Position: Sitting, Cuff Size: Standard)   Pulse 74   Temp 98 3 °F (36 8 °C) (Temporal)   Resp 16   Ht 4' 11" (1 499 m)   Wt 53 5 kg (118 lb)   SpO2 99%   BMI 23 83 kg/m²          Physical Exam  Constitutional:       Appearance: Normal appearance  HENT:      Head: Normocephalic and atraumatic  Eyes:      Extraocular Movements: Extraocular movements intact  Cardiovascular:      Rate and Rhythm: Normal rate  Pulmonary:      Effort: Pulmonary effort is normal  No respiratory distress  Musculoskeletal:      Cervical back: Neck supple  Neurological:      Mental Status: Mental status is at baseline  Psychiatric:         Mood and Affect: Mood normal          Behavior: Behavior normal          Thought Content:  Thought content normal          Judgment: Judgment normal

## 2022-04-14 NOTE — ASSESSMENT & PLAN NOTE
Moderately Controlled today, however mood seems to be life stressor dependent    Compliant with Lexapro 10 mg daily, run out of medication for a few days, refill provided  - Continue Lexapro 10 mg daily  - cause therapy with Dr Erwin Wagner  with next appointment in June  - Counseled patient on adequate coping strategies like exercise,having open conversations to improve her family dynamics

## 2022-04-20 ENCOUNTER — APPOINTMENT (OUTPATIENT)
Dept: LAB | Facility: HOSPITAL | Age: 42
End: 2022-04-20
Payer: COMMERCIAL

## 2022-04-20 DIAGNOSIS — Z00.8 HEALTH EXAMINATION IN POPULATION SURVEY: ICD-10-CM

## 2022-04-20 LAB
CHOLEST SERPL-MCNC: 174 MG/DL
HDLC SERPL-MCNC: 74 MG/DL
LDLC SERPL CALC-MCNC: 82 MG/DL (ref 0–100)
NONHDLC SERPL-MCNC: 100 MG/DL
TRIGL SERPL-MCNC: 91 MG/DL

## 2022-04-20 PROCEDURE — 80061 LIPID PANEL: CPT

## 2022-04-20 PROCEDURE — 83036 HEMOGLOBIN GLYCOSYLATED A1C: CPT

## 2022-04-21 LAB
EST. AVERAGE GLUCOSE BLD GHB EST-MCNC: 100 MG/DL
HBA1C MFR BLD: 5.1 %

## 2022-04-27 ENCOUNTER — TELEMEDICINE (OUTPATIENT)
Dept: FAMILY MEDICINE CLINIC | Facility: CLINIC | Age: 42
End: 2022-04-27

## 2022-04-27 ENCOUNTER — CLINICAL SUPPORT (OUTPATIENT)
Dept: FAMILY MEDICINE CLINIC | Facility: CLINIC | Age: 42
End: 2022-04-27

## 2022-04-27 VITALS — TEMPERATURE: 101 F

## 2022-04-27 DIAGNOSIS — B34.9 VIRAL SYNDROME: Primary | ICD-10-CM

## 2022-04-27 DIAGNOSIS — Z11.52 ENCOUNTER FOR SCREENING FOR COVID-19: Primary | ICD-10-CM

## 2022-04-27 PROCEDURE — 99213 OFFICE O/P EST LOW 20 MIN: CPT | Performed by: PHYSICIAN ASSISTANT

## 2022-04-27 PROCEDURE — U0003 INFECTIOUS AGENT DETECTION BY NUCLEIC ACID (DNA OR RNA); SEVERE ACUTE RESPIRATORY SYNDROME CORONAVIRUS 2 (SARS-COV-2) (CORONAVIRUS DISEASE [COVID-19]), AMPLIFIED PROBE TECHNIQUE, MAKING USE OF HIGH THROUGHPUT TECHNOLOGIES AS DESCRIBED BY CMS-2020-01-R: HCPCS | Performed by: PHYSICIAN ASSISTANT

## 2022-04-27 PROCEDURE — U0005 INFEC AGEN DETEC AMPLI PROBE: HCPCS | Performed by: PHYSICIAN ASSISTANT

## 2022-04-27 NOTE — LETTER
April 27, 2022     Patient: Stone Fields  YOB: 1980  Date of Visit: 4/27/2022      To Whom it May Concern:    Stone Fields is under my professional care  Heidi Ordaz was seen in via telemedicine on 4/27/2022  Heidi Ordaz may not return to work until further notice  If you have any questions or concerns, please don't hesitate to call           Sincerely,          Meryl Queen PA-C        CC: No Recipients

## 2022-04-27 NOTE — PROGRESS NOTES
COVID-19 Outpatient Progress Note    Assessment/Plan:    Problem List Items Addressed This Visit        Other    Viral syndrome - Primary     Scratchy throat, fever, chills and cough began 2 days ago  Home COVID test negative  2 coworkers tested positive for COVID last week  Patient is fully vaccinated with booster however, given symptoms will retest for COVID in office  Isolation precautions discussed  May continue Advil or tylenol PRN  Discussed increasing po fluids and rest  ED precautions reviewed         Relevant Orders    COVID Only- Office Collect         Disposition:     Recommended patient to come to the office to test for COVID-19  I have spent 15 minutes directly with the patient  Greater than 50% of this time was spent in counseling/coordination of care regarding: instructions for management  Encounter provider Manish Reis PA-C    Provider located at [address] [place] [address] [id number] [place] [address]   [phone]    Recent Visits  No visits were found meeting these conditions  Showing recent visits within past 7 days and meeting all other requirements  Today's Visits  Date Type Provider Dept   04/27/22 Telemedicine Manish Reis PA-C  Anupam Reyes   Showing today's visits and meeting all other requirements  Future Appointments  No visits were found meeting these conditions  Showing future appointments within next 150 days and meeting all other requirements     This virtual check-in was done via [employer] and patient was informed that this is a secure, HIPAA-compliant platform  She agrees to proceed  Patient agrees to participate in a virtual check in via telephone or video visit instead of presenting to the office to address urgent/immediate medical needs  Patient is aware this is a billable service  After connecting through [facility], the patient was identified by name and date of birth   Kerri Mcfarlane was informed that this was a telemedicine visit and that the exam was being conducted confidentially over secure lines  My office door was closed  No one else was in the room  Henna Michelle acknowledged consent and understanding of privacy and security of the telemedicine visit  I informed the patient that I have reviewed her record in Epic and presented the opportunity for her to ask any questions regarding the visit today  The patient agreed to participate  Verification of patient location:  Patient is located in the following state in which I hold an active license: [place]    Subjective:   Henna Michelle is a 39 y o  female who is concerned about COVID-19  Patient's symptoms include fever, chills, fatigue, cough and headache  Patient denies malaise, congestion, rhinorrhea, sore throat (scratchy throat), anosmia, loss of taste, shortness of breath, chest tightness, abdominal pain, nausea, vomiting, diarrhea and myalgias  - Date of symptom onset: 4/25/2022      COVID-19 vaccination status: Fully vaccinated with booster    Exposure:   Contact with a person who is under investigation (PUI) for or who is positive for COVID-19 within the last 14 days?: Yes    Hospitalized recently for fever and/or lower respiratory symptoms?: No      Currently a healthcare worker that is involved in direct patient care?: Yes      Works in a special setting where the risk of COVID-19 transmission may be high? (this may include long-term care, correctional and long term facilities; homeless shelters; assisted-living facilities and group homes ): No      Resident in a special setting where the risk of COVID-19 transmission may be high? (this may include long-term care, correctional and long term facilities; homeless shelters; assisted-living facilities and group homes ): No      2 co-workers tested positive for [place] last week  Fevers, chills, scratchy throat and cough began 2 days ago  Took home COVID test yesterday, results negative   Temp this am 101  0  Ttaking Advil 200 mg q4 hrs with some improvement  Manager is requesting repeat COVID testing  Works as a CT scan tech  Lab Results   Component Value Date    SARSCOV2 Negative 2021     Past Medical History:   Diagnosis Date    Anxiety     Auto[facility] disorder ([facility] [address] [address] )     Crohn disease ([facility] [address] )     Ear infection      Past Surgical History:   Procedure Laterality Date     SECTION      COLONOSCOPY      UPPER GASTROINTESTINAL ENDOSCOPY       Current Outpatient Medications   Medication Sig Dispense Refill    amoxicillin (AMOXIL) 500 mg capsule every 12 (twelve) hours (Patient not taking: Reported on 10/6/2021 )      azaTHIOprine (IMURAN) 50 mg tablet Take 1 tablet (50 mg total) by mouth daily (Patient not taking: Reported on 2022 ) 90 tablet 1    ciprofloxacin-dexamethasone (CIPRODEX) otic suspension Administer 4 drops to the right ear 2 (two) times a day 7 5 mL 1    ciprofloxacin-dexamethasone (CIPRODEX) otic suspension Administer 4 drops to the right ear 2 (two) times a day (Patient not taking: Reported on 2022 ) 7 5 mL 0    cyclobenzaprine (FLEXERIL) 10 mg tablet Take 1 tablet (10 mg total) by mouth 3 (three) times a day as needed for muscle spasms 21 tablet 0    dicyclomine (BENTYL) 10 mg capsule Take 1 capsule (10 mg total) by mouth 3 (three) times a day as needed (abd pain) 90 capsule 3    escitalopram (LEXAPRO) 10 mg tablet Take 1 tablet (10 mg total) by mouth daily 90 tablet 2    Humira Pen 40 MG/0 4ML PNKT Inject 1 pen (40 mg) under the skin once every 14 days   2 each 10    hydrocortisone (ANUSOL-HC) 2 5 % rectal cream Apply topically 2 (two) times a day Apply topically 2 times daily for 7-10 days 28 g 0    hydrocortisone (ANUSOL-HC) 25 mg suppository Insert 1 suppository (25 mg total) into the rectum 2 (two) times a day 30 suppository 3    lidocaine (LIDODERM) 5 % Apply 1 patch topically daily Remove & Discard patch within 12 hours or as directed by MD 10 patch 0    Multiple Vitamins-Minerals (MULTIVITAMIN ADULT PO) Daily      oxyCODONE (OXY-IR) 5 MG capsule Take 1 capsule (5 mg total) by mouth every 6 (six) hours as needed for severe pain for up to 10 doses Can substitute oxycodone tablets for capsulesMax Daily Amount: 20 mg 10 capsule 0    pantoprazole (PROTONIX) 40 mg tablet Take 1 tablet (40 mg total) by mouth daily 30 tablet 2    PREBIOTIC PRODUCT PO Take by mouth      Probiotic Product (PROBIOTIC-10 PO) Probiotic   1 tab daily      Tranexamic Acid 650 MG TABS Take 2 tablets (1,300 mg total) by mouth 3 (three) times a day 5 tablet 4     No current facility-administered medications for this visit  No Known Allergies    Review of Systems   Constitutional: Positive for chills, fatigue and fever  HENT: Negative for congestion, rhinorrhea and sore throat (scratchy throat)  Respiratory: Positive for cough  Negative for chest tightness and shortness of breath  Gastrointestinal: Negative for abdominal pain, diarrhea, nausea and vomiting  Musculoskeletal: Negative for myalgias  Neurological: Positive for headaches  Objective:    Vitals:    04/27/22 0910   Temp: (!) 101 °F (38 3 °C)       Physical Exam  Constitutional:       General: She is not in acute distress  Appearance: Normal appearance  She is not ill-appearing or toxic-appearing  HENT:      Head: Normocephalic and atraumatic  Pulmonary:      Effort: Pulmonary effort is normal  No respiratory distress  Neurological:      Mental Status: She is alert and oriented to person, place, and time  Psychiatric:         Mood and Affect: Mood normal          Behavior: Behavior normal          Thought Content: Thought content normal          Judgment: Judgment normal          VIRTUAL VISIT DISCLAIMER    Edmond Berry verbally agrees to participate in [employer]   Pt is aware that [employer] could be limited without vital signs or the ability to perform a full hands-on physical Rito Smoker understands she or the provider may request at any time to terminate the video visit and request the patient to seek care or treatment in person

## 2022-04-27 NOTE — ASSESSMENT & PLAN NOTE
Scratchy throat, fever, chills and cough began 2 days ago  Home COVID test negative  2 coworkers tested positive for COVID last week  Patient is fully vaccinated with booster however, given symptoms will retest for COVID in office  Isolation precautions discussed     May continue Advil or tylenol PRN  Discussed increasing po fluids and rest  ED precautions reviewed

## 2022-04-28 ENCOUNTER — TELEPHONE (OUTPATIENT)
Dept: FAMILY MEDICINE CLINIC | Facility: CLINIC | Age: 42
End: 2022-04-28

## 2022-04-28 LAB — SARS-COV-2 RNA RESP QL NAA+PROBE: POSITIVE

## 2022-04-28 NOTE — TELEPHONE ENCOUNTER
Huey is requesting for Dr Norma Soliman to give her a call due to her having positive test and that jake is requesting a return date      Mathias [phone]

## 2022-04-28 NOTE — TELEPHONE ENCOUNTER
Called patient, she had telemed visit with Nikhil Figueroa yesterday, tested positive for Covid  She did not feel well for 2 days, but today she is feeling much better  She does know that she should remain out of work until Monday, she doesn't need any return to work notes   She just wanted to let you know

## 2022-05-24 ENCOUNTER — OFFICE VISIT (OUTPATIENT)
Dept: GASTROENTEROLOGY | Facility: CLINIC | Age: 42
End: 2022-05-24
Payer: COMMERCIAL

## 2022-05-24 ENCOUNTER — PREP FOR PROCEDURE (OUTPATIENT)
Dept: PLASTIC SURGERY | Facility: CLINIC | Age: 42
End: 2022-05-24

## 2022-05-24 VITALS
SYSTOLIC BLOOD PRESSURE: 114 MMHG | DIASTOLIC BLOOD PRESSURE: 83 MMHG | HEART RATE: 78 BPM | TEMPERATURE: 98.2 F | HEIGHT: 59 IN | WEIGHT: 115.6 LBS | BODY MASS INDEX: 23.31 KG/M2

## 2022-05-24 DIAGNOSIS — K50.00 CROHN'S DISEASE INVOLVING TERMINAL ILEUM (HCC): Primary | ICD-10-CM

## 2022-05-24 DIAGNOSIS — L90.5 SCAR OF SKIN: Primary | ICD-10-CM

## 2022-05-24 DIAGNOSIS — Z87.828 HISTORY OF MOTOR VEHICLE ACCIDENT: ICD-10-CM

## 2022-05-24 PROCEDURE — 99213 OFFICE O/P EST LOW 20 MIN: CPT | Performed by: INTERNAL MEDICINE

## 2022-05-24 NOTE — LETTER
May 24, 2022     [address] [address]    Patient: Yanet Saldana   YOB: 1980   Date of Visit: 5/24/2022       Dear Dr General Miller: Thank you for referring Yanet Saldana to me for evaluation  Below are my notes for this consultation  If you have questions, please do not hesitate to call me  I look forward to following your patient along with you  Sincerely,        Julianne Patino MD        CC: No Recipients  Julianne Patino MD  5/24/2022 10:42 AM  Sign when Signing Visit  [address] [facility]  Yanet Saldana 39 y o  female MRN: [id number]            Assessment & Plan:    Pleasant 59-year-old female with a history of Crohn's ileitis, had been maintained on many years of azathioprine, we tried to wean this off at point since she had worsening symptoms  1  Crohn's ileitis:  Clinically had been in remission on azathioprine however due to long-term potential side effects we transitioned her to Humira  -patient tolerated Humira very well  -she is up-to-date on vaccinations, she should follow-up with PCP to get a pneumonia vaccine, will send a message to her PCP  -continue current medications, mild call with any change or worsening symptoms or flare symptoms  -we will see her back in 6 months time and discussed repeat colonoscopy for evaluation in the future as needed      Jocy was seen today for follow-up  Diagnoses and all orders for this visit:    Crohn's disease involving terminal ileum ([facility] [address] )            _____________________________________________________________        CC: Follow-up    HPI:  Yanet Saldana is a 39 y  o female who is here for follow-up of Crohn's ileitis  Patient is doing very well, she reports she is tolerating Humira without any significant side effects  She had mild localized skin reaction, there was a subcutaneously shaped scar in her right lower quadrant at the site of her injection  But she denies any pain  Appetite is good, bowel movements have been regular  Denies any arthralgias, ocular symptoms  She is having increasing stressors at home, her son has severe depression and suicidal ideation  Patient is going to drop her daughter [place] for the summer      ROS:  The remainder of the ROS was negative except for the pertinent positives mentioned in HPI  Allergies: Epinephrine    Medications:   Current Outpatient Medications:     ciprofloxacin-dexamethasone (CIPRODEX) otic suspension, Administer 4 drops to the right ear 2 (two) times a day, Disp: 7 5 mL, Rfl: 1    COLLAGEN PO, Take by mouth, Disp: , Rfl:     dicyclomine (BENTYL) 10 mg capsule, Take 1 capsule (10 mg total) by mouth 3 (three) times a day as needed (abd pain), Disp: 90 capsule, Rfl: 3    escitalopram (LEXAPRO) 10 mg tablet, Take 1 tablet (10 mg total) by mouth daily, Disp: 90 tablet, Rfl: 2    Humira Pen 40 MG/0 4ML PNKT, Inject 1 pen (40 mg) under the skin once every 14 days  , Disp: 2 each, Rfl: 10    hydrocortisone (ANUSOL-HC) 2 5 % rectal cream, Apply topically 2 (two) times a day Apply topically 2 times daily for 7-10 days, Disp: 28 g, Rfl: 0    hydrocortisone (ANUSOL-HC) 25 mg suppository, Insert 1 suppository (25 mg total) into the rectum 2 (two) times a day, Disp: 30 suppository, Rfl: 3    Multiple Vitamins-Minerals (MULTIVITAMIN ADULT PO), Daily, Disp: , Rfl:     PREBIOTIC PRODUCT PO, Take by mouth, Disp: , Rfl:     Probiotic Product (PROBIOTIC-10 PO), Probiotic  1 tab daily, Disp: , Rfl:     Tranexamic Acid 650 MG TABS, Take 2 tablets (1,300 mg total) by mouth 3 (three) times a day, Disp: 5 tablet, Rfl: 4    amoxicillin (AMOXIL) 500 mg capsule, every 12 (twelve) hours (Patient not taking: No sig reported), Disp: , Rfl:     ciprofloxacin-dexamethasone (CIPRODEX) otic suspension, Administer 4 drops to the right ear 2 (two) times a day (Patient not taking: No sig reported), Disp: 7 5 mL, Rfl: 0    cyclobenzaprine (FLEXERIL) 10 mg tablet, Take 1 tablet (10 mg total) by mouth 3 (three) times a day as needed for muscle spasms (Patient not taking: Reported on 2022), Disp: 21 tablet, Rfl: 0    lidocaine (LIDODERM) 5 %, Apply 1 patch topically daily Remove & Discard patch within 12 hours or as directed by MD (Patient not taking: Reported on 2022), Disp: 10 patch, Rfl: 0    oxyCODONE (OXY-IR) 5 MG capsule, Take 1 capsule (5 mg total) by mouth every 6 (six) hours as needed for severe pain for up to 10 doses Can substitute oxycodone tablets for capsulesMax Daily Amount: 20 mg (Patient not taking: Reported on 2022), Disp: 10 capsule, Rfl: 0    pantoprazole (PROTONIX) 40 mg tablet, Take 1 tablet (40 mg total) by mouth daily, Disp: 30 tablet, Rfl: 2    Past Medical History:   Diagnosis Date    Anxiety     Autoimmune disorder ([address] )     Crohn disease ([address] )     Ear infection        Past Surgical History:   Procedure Laterality Date     SECTION      COLONOSCOPY      UPPER GASTROINTESTINAL ENDOSCOPY         Family History   Problem Relation Age of Onset    Obesity Mother     Hypertension Mother     Obesity Father     Hypertension Father     Colon cancer Neg Hx     Liver disease Neg Hx         reports that she has never smoked  She has never used smokeless tobacco  She reports current alcohol use of about 2 0 standard drinks of alcohol per week  She reports that she does not use drugs        Physical Exam:    /83 (BP Location: Right arm, Patient Position: Sitting, Cuff Size: Standard)   Pulse 78   Temp 98 2 °F (36 8 °C) (Temporal)   Ht 4' 11" (1 499 m)   Wt 52 4 kg (115 lb 9 6 oz)   BMI 23 35 kg/m²     Gen: wn/wd, NAD, healthy-appearing female  HEENT: anicteric, MMM, no cervical LAD  CVS: RRR, no m/r/g  CHEST: CTA b/l  ABD: +BS, soft, NT,ND, no hepatosplenomegaly  EXT: no c/c/e  NEURO: aaox3  SKIN: NO rashes, mild 3 cm x 3 cm LN subcutaneously discoloration in right lower quadrant

## 2022-05-24 NOTE — PROGRESS NOTES
[address] [facility]  Marci Bellamy 39 y o  female MRN: [id number]            Assessment & Plan:    Pleasant 42-year-old female with a history of Crohn's ileitis, had been maintained on many years of azathioprine, we tried to wean this off at point since she had worsening symptoms  1  Crohn's ileitis:  Clinically had been in remission on azathioprine however due to long-term potential side effects we transitioned her to Humira  -patient tolerated Humira very well  -she is up-to-date on vaccinations, she should follow-up with PCP to get a pneumonia vaccine, will send a message to her PCP  -continue current medications, mild call with any change or worsening symptoms or flare symptoms  -we will see her back in 6 months time and discussed repeat colonoscopy for evaluation in the future as needed      Jocy was seen today for follow-up  Diagnoses and all orders for this visit:    Crohn's disease involving terminal ileum ([facility] [address] )            _____________________________________________________________        CC: Follow-up    HPI:  Marci Bellamy is a 39 y  o female who is here for follow-up of Crohn's ileitis  Patient is doing very well, she reports she is tolerating Humira without any significant side effects  She had mild localized skin reaction, there was a subcutaneously shaped scar in her right lower quadrant at the site of her injection  But she denies any pain  Appetite is good, bowel movements have been regular  Denies any arthralgias, ocular symptoms  She is having increasing stressors at home, her son has severe depression and suicidal ideation  Patient is going to drop her daughter [place] for the summer      ROS:  The remainder of the ROS was negative except for the pertinent positives mentioned in HPI        Allergies: Epinephrine    Medications:   Current Outpatient Medications:     ciprofloxacin-dexamethasone (CIPRODEX) otic suspension, Administer 4 drops to the right ear 2 (two) times a day, Disp: 7 5 mL, Rfl: 1    COLLAGEN PO, Take by mouth, Disp: , Rfl:     dicyclomine (BENTYL) 10 mg capsule, Take 1 capsule (10 mg total) by mouth 3 (three) times a day as needed (abd pain), Disp: 90 capsule, Rfl: 3    escitalopram (LEXAPRO) 10 mg tablet, Take 1 tablet (10 mg total) by mouth daily, Disp: 90 tablet, Rfl: 2    Humira Pen 40 MG/0 4ML PNKT, Inject 1 pen (40 mg) under the skin once every 14 days  , Disp: 2 each, Rfl: 10    hydrocortisone (ANUSOL-HC) 2 5 % rectal cream, Apply topically 2 (two) times a day Apply topically 2 times daily for 7-10 days, Disp: 28 g, Rfl: 0    hydrocortisone (ANUSOL-HC) 25 mg suppository, Insert 1 suppository (25 mg total) into the rectum 2 (two) times a day, Disp: 30 suppository, Rfl: 3    Multiple Vitamins-Minerals (MULTIVITAMIN ADULT PO), Daily, Disp: , Rfl:     PREBIOTIC PRODUCT PO, Take by mouth, Disp: , Rfl:     Probiotic Product (PROBIOTIC-10 PO), Probiotic  1 tab daily, Disp: , Rfl:     Tranexamic Acid 650 MG TABS, Take 2 tablets (1,300 mg total) by mouth 3 (three) times a day, Disp: 5 tablet, Rfl: 4    amoxicillin (AMOXIL) 500 mg capsule, every 12 (twelve) hours (Patient not taking: No sig reported), Disp: , Rfl:     ciprofloxacin-dexamethasone (CIPRODEX) otic suspension, Administer 4 drops to the right ear 2 (two) times a day (Patient not taking: No sig reported), Disp: 7 5 mL, Rfl: 0    cyclobenzaprine (FLEXERIL) 10 mg tablet, Take 1 tablet (10 mg total) by mouth 3 (three) times a day as needed for muscle spasms (Patient not taking: Reported on 5/24/2022), Disp: 21 tablet, Rfl: 0    lidocaine (LIDODERM) 5 %, Apply 1 patch topically daily Remove & Discard patch within 12 hours or as directed by MD (Patient not taking: Reported on 5/24/2022), Disp: 10 patch, Rfl: 0    oxyCODONE (OXY-IR) 5 MG capsule, Take 1 capsule (5 mg total) by mouth every 6 (six) hours as needed for severe pain for up to 10 doses Can substitute oxycodone tablets for capsulesMax Daily Amount: 20 mg (Patient not taking: Reported on 2022), Disp: 10 capsule, Rfl: 0    pantoprazole (PROTONIX) 40 mg tablet, Take 1 tablet (40 mg total) by mouth daily, Disp: 30 tablet, Rfl: 2    Past Medical History:   Diagnosis Date    Anxiety     Autoimmune disorder (HCC)     Crohn disease ([address] )     Ear infection        Past Surgical History:   Procedure Laterality Date     SECTION      COLONOSCOPY      UPPER GASTROINTESTINAL ENDOSCOPY         Family History   Problem Relation Age of Onset    Obesity Mother     Hypertension Mother     Obesity Father     Hypertension Father     Colon cancer Neg Hx     Liver disease Neg Hx         reports that she has never smoked  She has never used smokeless tobacco  She reports current alcohol use of about 2 0 standard drinks of alcohol per week  She reports that she does not use drugs        Physical Exam:    /83 (BP Location: Right arm, Patient Position: Sitting, Cuff Size: Standard)   Pulse 78   Temp 98 2 °F (36 8 °C) (Temporal)   Ht 4' 11" (1 499 m)   Wt 52 4 kg (115 lb 9 6 oz)   BMI 23 35 kg/m²     Gen: wn/wd, NAD, healthy-appearing female  HEENT: anicteric, MMM, no cervical LAD  CVS: RRR, no m/r/g  CHEST: CTA b/l  ABD: +BS, soft, NT,ND, no hepatosplenomegaly  EXT: no c/c/e  NEURO: aaox3  SKIN: NO rashes, mild 3 cm x 3 cm LN subcutaneously discoloration in right lower quadrant

## 2022-05-27 ENCOUNTER — TELEPHONE (OUTPATIENT)
Dept: FAMILY MEDICINE CLINIC | Facility: CLINIC | Age: 42
End: 2022-05-27

## 2022-05-27 NOTE — TELEPHONE ENCOUNTER
Patient seen last month for Covid, will be traveling June 3rd and returning June 11th  A required note for airline traveling is needed  Can we generate note?

## 2022-07-06 ENCOUNTER — HOSPITAL ENCOUNTER (OUTPATIENT)
Dept: MAMMOGRAPHY | Facility: CLINIC | Age: 42
Discharge: HOME | End: 2022-07-06
Payer: COMMERCIAL

## 2022-07-06 VITALS — WEIGHT: 115 LBS | HEIGHT: 64 IN | BODY MASS INDEX: 19.63 KG/M2

## 2022-07-06 DIAGNOSIS — Z12.31 ENCOUNTER FOR SCREENING MAMMOGRAM FOR MALIGNANT NEOPLASM OF BREAST: ICD-10-CM

## 2022-07-06 PROCEDURE — 77067 SCR MAMMO BI INCL CAD: CPT

## 2022-07-06 PROCEDURE — 77063 BREAST TOMOSYNTHESIS BI: CPT

## 2022-07-27 ENCOUNTER — HOSPITAL ENCOUNTER (OUTPATIENT)
Dept: MAMMOGRAPHY | Facility: CLINIC | Age: 42
Discharge: HOME | End: 2022-07-27
Payer: COMMERCIAL

## 2022-07-27 ENCOUNTER — HOSPITAL ENCOUNTER (OUTPATIENT)
Dept: ULTRASOUND IMAGING | Facility: CLINIC | Age: 42
Discharge: HOME | End: 2022-07-27
Payer: COMMERCIAL

## 2022-07-27 VITALS — BODY MASS INDEX: 19.63 KG/M2 | WEIGHT: 115 LBS | HEIGHT: 64 IN

## 2022-07-27 DIAGNOSIS — R92.8 ABNORMAL SCREENING MAMMOGRAM: ICD-10-CM

## 2022-07-27 PROCEDURE — 76642 ULTRASOUND BREAST LIMITED: CPT

## 2022-07-27 PROCEDURE — 77065 DX MAMMO INCL CAD UNI: CPT

## 2022-07-27 PROCEDURE — G0279 TOMOSYNTHESIS, MAMMO: HCPCS

## 2022-07-29 ENCOUNTER — APPOINTMENT (OUTPATIENT)
Dept: PREADMISSION TESTING | Facility: HOSPITAL | Age: 42
End: 2022-07-29
Payer: COMMERCIAL

## 2022-08-01 ENCOUNTER — APPOINTMENT (OUTPATIENT)
Dept: LAB | Facility: HOSPITAL | Age: 42
End: 2022-08-01
Payer: COMMERCIAL

## 2022-08-01 DIAGNOSIS — Z87.828 HISTORY OF MOTOR VEHICLE ACCIDENT: ICD-10-CM

## 2022-08-01 DIAGNOSIS — K50.00 CROHN'S DISEASE INVOLVING TERMINAL ILEUM (HCC): ICD-10-CM

## 2022-08-01 DIAGNOSIS — L90.5 SCAR OF SKIN: ICD-10-CM

## 2022-08-01 LAB
ALBUMIN SERPL BCP-MCNC: 3.8 G/DL (ref 3.5–5)
ALP SERPL-CCNC: 66 U/L (ref 46–116)
ALT SERPL W P-5'-P-CCNC: 29 U/L (ref 12–78)
ANION GAP SERPL CALCULATED.3IONS-SCNC: 7 MMOL/L (ref 4–13)
AST SERPL W P-5'-P-CCNC: 20 U/L (ref 5–45)
BASOPHILS # BLD AUTO: 0.04 THOUSANDS/ΜL (ref 0–0.1)
BASOPHILS NFR BLD AUTO: 1 % (ref 0–1)
BILIRUB SERPL-MCNC: 0.6 MG/DL (ref 0.2–1)
BUN SERPL-MCNC: 10 MG/DL (ref 5–25)
CALCIUM SERPL-MCNC: 9 MG/DL (ref 8.3–10.1)
CHLORIDE SERPL-SCNC: 103 MMOL/L (ref 96–108)
CO2 SERPL-SCNC: 29 MMOL/L (ref 21–32)
CREAT SERPL-MCNC: 0.64 MG/DL (ref 0.6–1.3)
EOSINOPHIL # BLD AUTO: 0.16 THOUSAND/ΜL (ref 0–0.61)
EOSINOPHIL NFR BLD AUTO: 3 % (ref 0–6)
ERYTHROCYTE [DISTWIDTH] IN BLOOD BY AUTOMATED COUNT: 11.9 % (ref 11.6–15.1)
GFR SERPL CREATININE-BSD FRML MDRD: 110 ML/MIN/1.73SQ M
GLUCOSE P FAST SERPL-MCNC: 124 MG/DL (ref 65–99)
HCT VFR BLD AUTO: 34.2 % (ref 34.8–46.1)
HGB BLD-MCNC: 11.1 G/DL (ref 11.5–15.4)
IMM GRANULOCYTES # BLD AUTO: 0.01 THOUSAND/UL (ref 0–0.2)
IMM GRANULOCYTES NFR BLD AUTO: 0 % (ref 0–2)
LYMPHOCYTES # BLD AUTO: 1.82 THOUSANDS/ΜL (ref 0.6–4.47)
LYMPHOCYTES NFR BLD AUTO: 32 % (ref 14–44)
MCH RBC QN AUTO: 29.6 PG (ref 26.8–34.3)
MCHC RBC AUTO-ENTMCNC: 32.5 G/DL (ref 31.4–37.4)
MCV RBC AUTO: 91 FL (ref 82–98)
MONOCYTES # BLD AUTO: 0.38 THOUSAND/ΜL (ref 0.17–1.22)
MONOCYTES NFR BLD AUTO: 7 % (ref 4–12)
NEUTROPHILS # BLD AUTO: 3.25 THOUSANDS/ΜL (ref 1.85–7.62)
NEUTS SEG NFR BLD AUTO: 57 % (ref 43–75)
NRBC BLD AUTO-RTO: 0 /100 WBCS
PLATELET # BLD AUTO: 298 THOUSANDS/UL (ref 149–390)
PMV BLD AUTO: 10.2 FL (ref 8.9–12.7)
POTASSIUM SERPL-SCNC: 3.6 MMOL/L (ref 3.5–5.3)
PROT SERPL-MCNC: 7.6 G/DL (ref 6.4–8.4)
RBC # BLD AUTO: 3.75 MILLION/UL (ref 3.81–5.12)
SODIUM SERPL-SCNC: 139 MMOL/L (ref 135–147)
WBC # BLD AUTO: 5.66 THOUSAND/UL (ref 4.31–10.16)

## 2022-08-01 PROCEDURE — 80053 COMPREHEN METABOLIC PANEL: CPT

## 2022-08-01 PROCEDURE — 36415 COLL VENOUS BLD VENIPUNCTURE: CPT

## 2022-08-01 PROCEDURE — 85025 COMPLETE CBC W/AUTO DIFF WBC: CPT

## 2022-08-03 PROCEDURE — NC001 PR NO CHARGE: Performed by: PHYSICIAN ASSISTANT

## 2022-08-03 NOTE — PRE-PROCEDURE INSTRUCTIONS
Pre-Surgery Instructions:   Medication Instructions    COLLAGEN PO Instructed to take per normal schedule including DOS    Humira Pen 40 MG/0 4ML PNKT Instructed to take per normal schedule    Multiple Vitamins-Minerals (MULTIVITAMIN ADULT PO) Instructed to take per normal schedule except DOS    Probiotic Product (PROBIOTIC-10 PO) Instructed to take per normal schedule except DOS    Pre-op medication, and showering instructions with antibacteral soap reviewed  Instructed to avoid all ASA/NSAIDs and OTC Vit/Supp from now until after surgery per anesthesia guidelines  Tylenol ok prn  Pt  Verbalized an understanding of all instructions reviewed and offers no concerns at this time
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
## 2022-08-08 ENCOUNTER — ANESTHESIA EVENT (OUTPATIENT)
Dept: PERIOP | Facility: HOSPITAL | Age: 42
End: 2022-08-08
Payer: COMMERCIAL

## 2022-08-08 PROBLEM — B34.9 VIRAL SYNDROME: Status: RESOLVED | Noted: 2022-04-27 | Resolved: 2022-08-08

## 2022-08-09 ENCOUNTER — HOSPITAL ENCOUNTER (OUTPATIENT)
Facility: HOSPITAL | Age: 42
Setting detail: OUTPATIENT SURGERY
Discharge: HOME | End: 2022-08-09
Attending: SURGERY | Admitting: SURGERY
Payer: COMMERCIAL

## 2022-08-09 ENCOUNTER — ANESTHESIA (OUTPATIENT)
Dept: PERIOP | Facility: HOSPITAL | Age: 42
End: 2022-08-09
Payer: COMMERCIAL

## 2022-08-09 VITALS
DIASTOLIC BLOOD PRESSURE: 60 MMHG | HEART RATE: 86 BPM | TEMPERATURE: 98 F | HEIGHT: 60 IN | BODY MASS INDEX: 22.58 KG/M2 | WEIGHT: 115 LBS | OXYGEN SATURATION: 96 % | RESPIRATION RATE: 18 BRPM | SYSTOLIC BLOOD PRESSURE: 99 MMHG

## 2022-08-09 LAB
EXT PREGNANCY TEST URINE: NEGATIVE
EXT. CONTROL: NORMAL

## 2022-08-09 PROCEDURE — 81025 URINE PREGNANCY TEST: CPT | Performed by: SURGERY

## 2022-08-09 PROCEDURE — 13132 CMPLX RPR F/C/C/M/N/AX/G/H/F: CPT | Performed by: SURGERY

## 2022-08-09 PROCEDURE — 11443 EXC FACE-MM B9+MARG 2.1-3 CM: CPT | Performed by: SURGERY

## 2022-08-09 PROCEDURE — 14041 TIS TRNFR F/C/C/M/N/A/G/H/F: CPT | Performed by: SURGERY

## 2022-08-09 RX ORDER — LIDOCAINE HYDROCHLORIDE 10 MG/ML
INJECTION, SOLUTION EPIDURAL; INFILTRATION; INTRACAUDAL; PERINEURAL AS NEEDED
Status: DISCONTINUED | OUTPATIENT
Start: 2022-08-09 | End: 2022-08-09

## 2022-08-09 RX ORDER — FENTANYL CITRATE/PF 50 MCG/ML
25 SYRINGE (ML) INJECTION
Status: DISCONTINUED | OUTPATIENT
Start: 2022-08-09 | End: 2022-08-09 | Stop reason: HOSPADM

## 2022-08-09 RX ORDER — ONDANSETRON 2 MG/ML
4 INJECTION INTRAMUSCULAR; INTRAVENOUS ONCE AS NEEDED
Status: DISCONTINUED | OUTPATIENT
Start: 2022-08-09 | End: 2022-08-09 | Stop reason: HOSPADM

## 2022-08-09 RX ORDER — LIDOCAINE HYDROCHLORIDE AND EPINEPHRINE 10; 10 MG/ML; UG/ML
INJECTION, SOLUTION INFILTRATION; PERINEURAL AS NEEDED
Status: DISCONTINUED | OUTPATIENT
Start: 2022-08-09 | End: 2022-08-09 | Stop reason: HOSPADM

## 2022-08-09 RX ORDER — KETOROLAC TROMETHAMINE 30 MG/ML
INJECTION, SOLUTION INTRAMUSCULAR; INTRAVENOUS AS NEEDED
Status: DISCONTINUED | OUTPATIENT
Start: 2022-08-09 | End: 2022-08-09

## 2022-08-09 RX ORDER — FENTANYL CITRATE 50 UG/ML
INJECTION, SOLUTION INTRAMUSCULAR; INTRAVENOUS AS NEEDED
Status: DISCONTINUED | OUTPATIENT
Start: 2022-08-09 | End: 2022-08-09

## 2022-08-09 RX ORDER — MAGNESIUM HYDROXIDE 1200 MG/15ML
LIQUID ORAL AS NEEDED
Status: DISCONTINUED | OUTPATIENT
Start: 2022-08-09 | End: 2022-08-09 | Stop reason: HOSPADM

## 2022-08-09 RX ORDER — HYDROMORPHONE HCL IN WATER/PF 6 MG/30 ML
0.2 PATIENT CONTROLLED ANALGESIA SYRINGE INTRAVENOUS
Status: DISCONTINUED | OUTPATIENT
Start: 2022-08-09 | End: 2022-08-09 | Stop reason: HOSPADM

## 2022-08-09 RX ORDER — DEXAMETHASONE SODIUM PHOSPHATE 10 MG/ML
INJECTION, SOLUTION INTRAMUSCULAR; INTRAVENOUS AS NEEDED
Status: DISCONTINUED | OUTPATIENT
Start: 2022-08-09 | End: 2022-08-09

## 2022-08-09 RX ORDER — CEFAZOLIN SODIUM 1 G/50ML
1000 SOLUTION INTRAVENOUS ONCE
Status: COMPLETED | OUTPATIENT
Start: 2022-08-09 | End: 2022-08-09

## 2022-08-09 RX ORDER — GLYCOPYRROLATE 0.2 MG/ML
INJECTION INTRAMUSCULAR; INTRAVENOUS AS NEEDED
Status: DISCONTINUED | OUTPATIENT
Start: 2022-08-09 | End: 2022-08-09

## 2022-08-09 RX ORDER — EPHEDRINE SULFATE 50 MG/ML
INJECTION INTRAVENOUS AS NEEDED
Status: DISCONTINUED | OUTPATIENT
Start: 2022-08-09 | End: 2022-08-09

## 2022-08-09 RX ORDER — ONDANSETRON 2 MG/ML
INJECTION INTRAMUSCULAR; INTRAVENOUS AS NEEDED
Status: DISCONTINUED | OUTPATIENT
Start: 2022-08-09 | End: 2022-08-09

## 2022-08-09 RX ORDER — LIDOCAINE HYDROCHLORIDE 10 MG/ML
0.5 INJECTION, SOLUTION EPIDURAL; INFILTRATION; INTRACAUDAL; PERINEURAL ONCE AS NEEDED
Status: DISCONTINUED | OUTPATIENT
Start: 2022-08-09 | End: 2022-08-09 | Stop reason: HOSPADM

## 2022-08-09 RX ORDER — SODIUM CHLORIDE, SODIUM LACTATE, POTASSIUM CHLORIDE, CALCIUM CHLORIDE 600; 310; 30; 20 MG/100ML; MG/100ML; MG/100ML; MG/100ML
125 INJECTION, SOLUTION INTRAVENOUS CONTINUOUS
Status: DISCONTINUED | OUTPATIENT
Start: 2022-08-09 | End: 2022-08-09 | Stop reason: HOSPADM

## 2022-08-09 RX ORDER — MIDAZOLAM HYDROCHLORIDE 2 MG/2ML
INJECTION, SOLUTION INTRAMUSCULAR; INTRAVENOUS AS NEEDED
Status: DISCONTINUED | OUTPATIENT
Start: 2022-08-09 | End: 2022-08-09

## 2022-08-09 RX ORDER — PROPOFOL 10 MG/ML
INJECTION, EMULSION INTRAVENOUS AS NEEDED
Status: DISCONTINUED | OUTPATIENT
Start: 2022-08-09 | End: 2022-08-09

## 2022-08-09 RX ADMIN — CEFAZOLIN SODIUM 1000 MG: 1 SOLUTION INTRAVENOUS at 09:04

## 2022-08-09 RX ADMIN — FENTANYL CITRATE 25 MCG: 50 INJECTION INTRAMUSCULAR; INTRAVENOUS at 09:46

## 2022-08-09 RX ADMIN — PROPOFOL 150 MG: 10 INJECTION, EMULSION INTRAVENOUS at 09:10

## 2022-08-09 RX ADMIN — SODIUM CHLORIDE, SODIUM LACTATE, POTASSIUM CHLORIDE, AND CALCIUM CHLORIDE: .6; .31; .03; .02 INJECTION, SOLUTION INTRAVENOUS at 09:04

## 2022-08-09 RX ADMIN — LIDOCAINE HYDROCHLORIDE 50 MG: 10 INJECTION, SOLUTION EPIDURAL; INFILTRATION; INTRACAUDAL; PERINEURAL at 09:10

## 2022-08-09 RX ADMIN — MIDAZOLAM 2 MG: 1 INJECTION INTRAMUSCULAR; INTRAVENOUS at 09:04

## 2022-08-09 RX ADMIN — GLYCOPYRROLATE 0.1 MG: 0.2 INJECTION INTRAMUSCULAR; INTRAVENOUS at 09:15

## 2022-08-09 RX ADMIN — EPHEDRINE SULFATE 10 MG: 50 INJECTION, SOLUTION INTRAVENOUS at 09:36

## 2022-08-09 RX ADMIN — ONDANSETRON 4 MG: 2 INJECTION INTRAMUSCULAR; INTRAVENOUS at 09:42

## 2022-08-09 RX ADMIN — DEXAMETHASONE SODIUM PHOSPHATE 10 MG: 10 INJECTION, SOLUTION INTRAMUSCULAR; INTRAVENOUS at 09:15

## 2022-08-09 RX ADMIN — FENTANYL CITRATE 25 MCG: 50 INJECTION INTRAMUSCULAR; INTRAVENOUS at 09:20

## 2022-08-09 RX ADMIN — EPHEDRINE SULFATE 5 MG: 50 INJECTION, SOLUTION INTRAVENOUS at 09:52

## 2022-08-09 RX ADMIN — EPHEDRINE SULFATE 5 MG: 50 INJECTION, SOLUTION INTRAVENOUS at 09:31

## 2022-08-09 RX ADMIN — KETOROLAC TROMETHAMINE 15 MG: 30 INJECTION, SOLUTION INTRAMUSCULAR at 09:42

## 2022-08-09 NOTE — ANESTHESIA PREPROCEDURE EVALUATION
Procedure:  SCAR REVISION OF RIGHT FOREHEAD AND RIGHT NASOLABIAL FOLD (Right Face)  COMPLEX CLOSURE VERSUS ZPLASTY (Right Head)    Relevant Problems   ANESTHESIA (within normal limits)   (-) History of anesthesia complications      CARDIO   (-) Chest pain   (-) AMATO (dyspnea on exertion)      GI/HEPATIC   (-) Gastroesophageal reflux disease      NEURO/PSYCH   (+) Anxiety      PULMONARY   (-) Shortness of breath   (-) URI (upper respiratory infection)      Digestive   (+) Crohn's disease involving terminal ileum (HCC)        Physical Exam    Airway    Mallampati score: I  TM Distance: >3 FB  Neck ROM: full     Dental   No notable dental hx     Cardiovascular      Pulmonary      Other Findings        Anesthesia Plan  ASA Score- 2     Anesthesia Type- general with ASA Monitors  Additional Monitors:   Airway Plan: LMA  Plan Factors-Exercise tolerance (METS): >4 METS  Chart reviewed  Patient summary reviewed  Induction- intravenous  Postoperative Plan-     Informed Consent- Anesthetic plan and risks discussed with patient  I personally reviewed this patient with the CRNA  Discussed and agreed on the Anesthesia Plan with the CRNA  Luanne Perea
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
## 2022-08-09 NOTE — ANESTHESIA POSTPROCEDURE EVALUATION
Post-Op Assessment Note    CV Status:  Stable  Pain Score: 0    Pain management: adequate     Mental Status:  Sleepy   Hydration Status:  Euvolemic   PONV Controlled:  Controlled   Airway Patency:  Patent      Post Op Vitals Reviewed: Yes      Staff: CRNA   Comments: vss sv nonobstructed uneventful        No complications documented      BP   121/59   Temp 97 0   Pulse 88   Resp 24   SpO2 100
yes

## 2022-08-09 NOTE — INTERVAL H&P NOTE
H&P reviewed  After examining the patient I find no changes in the patients condition since the H&P had been written      Vitals:    08/09/22 0809   BP: 113/83   Pulse: 78   Resp: 20   Temp: 98 4 °F (36 9 °C)   SpO2: 98%
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
## 2022-08-09 NOTE — DISCHARGE INSTRUCTIONS
[facility]  Dr Lore Christopher   [address] [facility], [address]  Phone: [phone]     Postoperative Instructions for Outpatient Surgery     These instructions are being provided by your doctor to give you basic guidelines during your post-op recovery  Please let our office know if your contact information has changed  Please call the office today for an appointment in 5-7 days for suture removal       Dressings: Steri strips, replace if they fall off  Activity Restrictions: Nothing strenuous for 48 hours  Bathing:  May shower tomorrow  Pat steri strips dry afterwards  Medications:    Resume pre-op medications  You may take tylenol, aleve, or ibuprofen for pain control                 Other: Elevate head of bed at night to sleep over the next 48 hours  Apply ice at 10 minute intervals over the next 48 hours while awake

## 2022-08-15 ENCOUNTER — OFFICE VISIT (OUTPATIENT)
Dept: PLASTIC SURGERY | Facility: CLINIC | Age: 42
End: 2022-08-15

## 2022-08-15 DIAGNOSIS — L90.5 SCAR OF FACE: Primary | ICD-10-CM

## 2022-08-15 PROCEDURE — 99024 POSTOP FOLLOW-UP VISIT: CPT | Performed by: PHYSICIAN ASSISTANT

## 2022-08-15 NOTE — PROGRESS NOTES
POST-OP EVALUATION  8/15/2022    Gurdeep Cooper is a 43 y o  female is here today for routine post-operative follow up  Patient is status post scar revision of the right forehead and right nasal labial fold on 2022 by Dr Debo Celestin  Past Medical History:   Diagnosis Date    Anxiety     Autoimmune disorder ([address] )     COVID 2022    fever    Crohn disease ([address] )     Ear infection      Past Surgical History:   Procedure Laterality Date     SECTION      COLONOSCOPY      FACIAL SCAR REVISION Right 2022    Procedure: SCAR REVISION OF RIGHT FOREHEAD AND RIGHT NASOLABIAL FOLD;  Surgeon: Lillian Hurley MD;  [facility];  Service: Plastics    NH ADJ TISS XFER HEAD,FAC,HAND 10 1-30 SQCM Right 2022    Procedure: COMPLEX CLOSURE VERSUS ZPLASTY;  Surgeon: Lillian Hurley MD;  [facility];  Service: Plastics    UPPER GASTROINTESTINAL ENDOSCOPY          Current Outpatient Medications:     ciprofloxacin-dexamethasone (CIPRODEX) otic suspension, Administer 4 drops to the right ear 2 (two) times a day (Patient not taking: No sig reported), Disp: 7 5 mL, Rfl: 0    COLLAGEN PO, Take by mouth, Disp: , Rfl:     escitalopram (LEXAPRO) 10 mg tablet, Take 1 tablet (10 mg total) by mouth daily (Patient taking differently: Take 5 mg by mouth daily), Disp: 90 tablet, Rfl: 2    Humira Pen 40 MG/0 4ML PNKT, Inject 1 pen (40 mg) under the skin once every 14 days  , Disp: 2 each, Rfl: 10    hydrocortisone (ANUSOL-HC) 2 5 % rectal cream, Apply topically 2 (two) times a day Apply topically 2 times daily for 7-10 days, Disp: 28 g, Rfl: 0    hydrocortisone (ANUSOL-HC) 25 mg suppository, Insert 1 suppository (25 mg total) into the rectum 2 (two) times a day, Disp: 30 suppository, Rfl: 3    lidocaine (LIDODERM) 5 %, Apply 1 patch topically daily Remove & Discard patch within 12 hours or as directed by MD (Patient not taking: Reported on 2022), Disp: 10 patch, Rfl: 0    Multiple Vitamins-Minerals (MULTIVITAMIN ADULT PO), Daily, Disp: , Rfl:     PREBIOTIC PRODUCT PO, Take by mouth, Disp: , Rfl:     Probiotic Product (PROBIOTIC-10 PO), Probiotic  1 tab daily, Disp: , Rfl:     Tranexamic Acid 650 MG TABS, Take 2 tablets (1,300 mg total) by mouth 3 (three) times a day, Disp: 5 tablet, Rfl: 4  Allergies: Epinephrine    Objective      Incisions clean, dry, intact  Suture ends removed  Assessment/Plan   Diagnoses and all orders for this visit:    Scar of face    Aquaphor/Bacitracin to incisions for one week, then silicone scar gel  Post Op Scar Care Instructions:  -Massage silicone scar gel (ex, Biocorneum, [facility] Silicone Scar Gel, Scar Away) into incision twice daily for 3 months  Or   -Apply silicone scar patch (ex, Oleeva) to incision for up to 23 hours per day for 3 months    -Wear SPF 30+ on incision at all times with any sun exposure  Call with any questions or concerns  Followup in 3 weeks      Allison Zamudio PA-C

## 2022-09-07 ENCOUNTER — OFFICE VISIT (OUTPATIENT)
Dept: PLASTIC SURGERY | Facility: CLINIC | Age: 42
End: 2022-09-07

## 2022-09-07 DIAGNOSIS — L90.5 SCAR OF FACE: Primary | ICD-10-CM

## 2022-09-07 PROCEDURE — 99024 POSTOP FOLLOW-UP VISIT: CPT | Performed by: PHYSICIAN ASSISTANT

## 2022-09-07 NOTE — PROGRESS NOTES
Assessment/Plan:   Hyun Kline is a 43year old female who is 4 weeks status post revision of right forehead and right nasolabial fold scars with complex closure  Please see HPI  I believe she is experiencing spitting sutures  I discussed with her how to take care of these  She will resume silicone scar treatment when able  She will also continue to avoid excessive sun exposure  Follow up in 4-6 weeks or sooner with any concerns  Diagnoses and all orders for this visit:    Scar of face          Subjective:     Patient ID: Minda Woods is a 43 y o  female  HPI   She reports multiple pimples over her scars  Review of Systems   Skin:        As per HPI         Objective:     Physical Exam  Skin:     Comments: Right forehead and right nasolabial fold scars are healing well  There are some spitting sutures noted  These were trimmed  Please see photo in epic

## 2022-10-17 ENCOUNTER — OFFICE VISIT (OUTPATIENT)
Dept: PLASTIC SURGERY | Facility: CLINIC | Age: 42
End: 2022-10-17

## 2022-10-17 DIAGNOSIS — L90.5 SCAR OF FACE: Primary | ICD-10-CM

## 2022-10-17 PROCEDURE — 99024 POSTOP FOLLOW-UP VISIT: CPT | Performed by: PHYSICIAN ASSISTANT

## 2022-10-17 NOTE — PROGRESS NOTES
Assessment/Plan:     Patient is a 40-year-old female who is status post revision of right forehead and right nasal labial fold scars by Dr Get Ortiz on 08/09/2022  Please see HPI  The patient presents to the office today for a scar check  The patient reports no issues or concerns with the exception of a slightly discolored and widen portion of her vertical forehead scar  The patient will continue a silicone scar treated  She should continue with avoidance of sun exposure and use of a SPF of at least 30  The patient will return to our office in approximately 6 months for a scar check or sooner with any questions or concerns  Diagnoses and all orders for this visit:    Scar of face          Subjective:     Patient ID: Cely Bhatti is a 43 y o  female  HPI     Patient reports no issues or concerns today except as detailed above  Review of Systems    See HPI    Objective:     Physical Exam      Patient's superior and inferior scars are healing well  No hypertrophy or hyperpigmentation with exception of 2 mm wide area at the superior border of her vertical forehead scar  Please see photo in media

## 2022-11-16 ENCOUNTER — OFFICE VISIT (OUTPATIENT)
Dept: FAMILY MEDICINE CLINIC | Facility: CLINIC | Age: 42
End: 2022-11-16

## 2022-11-16 VITALS
HEART RATE: 88 BPM | HEIGHT: 60 IN | DIASTOLIC BLOOD PRESSURE: 78 MMHG | SYSTOLIC BLOOD PRESSURE: 122 MMHG | OXYGEN SATURATION: 99 % | TEMPERATURE: 97.2 F | BODY MASS INDEX: 23.01 KG/M2 | RESPIRATION RATE: 18 BRPM | WEIGHT: 117.2 LBS

## 2022-11-16 DIAGNOSIS — B35.9 TINEA: Primary | ICD-10-CM

## 2022-11-16 DIAGNOSIS — Z23 ENCOUNTER FOR IMMUNIZATION: ICD-10-CM

## 2022-11-16 RX ORDER — KETOCONAZOLE 20 MG/ML
1 SHAMPOO TOPICAL 2 TIMES WEEKLY
Qty: 100 ML | Refills: 0 | Status: SHIPPED | OUTPATIENT
Start: 2022-11-17

## 2022-11-16 RX ORDER — KETOCONAZOLE 20 MG/G
CREAM TOPICAL DAILY
Qty: 15 G | Refills: 1 | Status: SHIPPED | OUTPATIENT
Start: 2022-11-16

## 2022-11-16 NOTE — PROGRESS NOTES
Name: Dainka Camp      : 1980      MRN: [id number]  Encounter Provider: Andrade Perales DO  Encounter Date: 2022   Encounter department: [address] [place] [address]  Tinea  -     ketoconazole (NIZORAL) 2 % cream; Apply topically daily  -     Ambulatory Referral to Dermatology; Future  -     ketoconazole (NIZORAL) 2 % shampoo; Apply 1 application topically 2 (two) times a week    2  Encounter for immunization  -     influenza vaccine, quadrivalent, recombinant, PF, 0 5 mL, for patients 18 yr+ (FLUBLOK)       Will trial ketoconazole at this time   Discussed importance of f/u with dermatology due to Crohn's hx and Humira medication       Subjective      Rash  This is a new problem  The current episode started more than 1 month ago  The problem has been waxing and waning since onset  Location: palmar region of hands  The problem is mild  The rash is characterized by blistering and itchiness (with white or yellow fluid)  She was exposed to nothing  Pertinent negatives include no anorexia, cough, diarrhea, fatigue, fever, rhinorrhea or shortness of breath  Past treatments include moisturizer  The treatment provided no relief  Her past medical history is significant for eczema  Of note: Patient was started on Humira less than a year ago after being diagnosed with Crohn's  Was recommended to f/u dermatology but has yet to do so      Review of Systems   Constitutional: Negative for fatigue and fever  HENT: Negative for rhinorrhea  Respiratory: Negative for cough and shortness of breath  Gastrointestinal: Negative for anorexia and diarrhea  Skin: Positive for rash          Current Outpatient Medications on File Prior to Visit   Medication Sig   • COLLAGEN PO Take by mouth   • escitalopram (LEXAPRO) 10 mg tablet Take 1 tablet (10 mg total) by mouth daily (Patient taking differently: Take 5 mg by mouth daily)   • Humira Pen 40 MG/0 4ML PNKT Inject 1 pen (40 mg) under the skin once every 14 days  • hydrocortisone (ANUSOL-HC) 2 5 % rectal cream Apply topically 2 (two) times a day Apply topically 2 times daily for 7-10 days   • lidocaine (LIDODERM) 5 % Apply 1 patch topically daily Remove & Discard patch within 12 hours or as directed by MD   • Multiple Vitamins-Minerals (MULTIVITAMIN ADULT PO) Daily   • PREBIOTIC PRODUCT PO Take by mouth   • Probiotic Product (PROBIOTIC-10 PO) Probiotic   1 tab daily   • Tranexamic Acid 650 MG TABS Take 2 tablets (1,300 mg total) by mouth 3 (three) times a day   • ciprofloxacin-dexamethasone (CIPRODEX) otic suspension Administer 4 drops to the right ear 2 (two) times a day (Patient not taking: Reported on 11/16/2022)   • hydrocortisone (ANUSOL-HC) 25 mg suppository Insert 1 suppository (25 mg total) into the rectum 2 (two) times a day (Patient not taking: Reported on 11/16/2022)       Objective     /78 (BP Location: Left arm, Patient Position: Sitting, Cuff Size: Standard)   Pulse 88   Temp (!) 97 2 °F (36 2 °C) (Temporal)   Resp 18   Ht 5' (1 524 m)   Wt 53 2 kg (117 lb 3 2 oz)   SpO2 99%   BMI 22 89 kg/m²     Physical Exam  Vitals and nursing note reviewed  Constitutional:       General: She is not in acute distress  Appearance: Normal appearance  She is normal weight  She is not ill-appearing  HENT:      Right Ear: External ear normal       Left Ear: External ear normal       Nose: Nose normal       Mouth/Throat:      Mouth: Mucous membranes are moist    Eyes:      General:         Right eye: No discharge  Left eye: No discharge  Cardiovascular:      Rate and Rhythm: Normal rate  Pulses: Normal pulses  Pulmonary:      Effort: Pulmonary effort is normal    Abdominal:      General: Abdomen is flat  There is no distension  Skin:     General: Skin is warm  Neurological:      General: No focal deficit present        Mental Status: She is alert and oriented to person, place, and time  Sensory: No sensory deficit  Psychiatric:         Mood and Affect: Mood normal          Behavior: Behavior normal          Thought Content:  Thought content normal            Trinity Perales DO

## 2022-11-22 ENCOUNTER — TELEPHONE (OUTPATIENT)
Dept: GASTROENTEROLOGY | Facility: CLINIC | Age: 42
End: 2022-11-22

## 2022-11-22 NOTE — TELEPHONE ENCOUNTER
Patients GI provider:  Dr Quynh Neal    Number to return call: [phone]     Reason for call: Pt called and stated Humira medication is giving you a rash on her skin and its very itchy please reach out to patient thank you     Scheduled procedure/appointment date if applicable: Appt 15/46/3449

## 2022-11-22 NOTE — TELEPHONE ENCOUNTER
Spoke with pt who stated she took her last humira dose 11/1  She skipped dose due on the 15th ( she takes dose every 2 weeks)  Pt plans on taking humira again on 11/29  Pt noticed rash on both hands 3 months ago that was minimal but got worse this month  She saw pcp (view OV notes) 11/16 and they referred her to dermo, provided her with topical nizoral cream, and advised this may be SE of humira or possibly fungal infection  Pt does admit that using the cream has alleviated the rash and itchiness  She stated that the itchiness is not as bad as it originally was  Last 3-4 days she did notice a rash on neck and jaw line  I advised pt keep upcoming OV with Dr Edie Abdullahi 11/29, remain on nizoral cream, and follow up with dermatology on 1/11/23  I recommended benadryl if symptoms of itchiness worsen  I explained possibility of rash caused by humira but I also explained it may be d/t other factors  Pt verbalized understanding

## 2022-11-23 ENCOUNTER — CONSULT (OUTPATIENT)
Dept: MULTI SPECIALTY CLINIC | Facility: CLINIC | Age: 42
End: 2022-11-23

## 2022-11-23 VITALS — TEMPERATURE: 97.3 F | HEIGHT: 60 IN | WEIGHT: 118 LBS | BODY MASS INDEX: 23.16 KG/M2

## 2022-11-23 DIAGNOSIS — R21 RASH: Primary | ICD-10-CM

## 2022-11-23 DIAGNOSIS — B35.9 TINEA: ICD-10-CM

## 2022-11-23 RX ORDER — CLOBETASOL PROPIONATE 0.5 MG/G
OINTMENT TOPICAL 2 TIMES DAILY
Qty: 30 G | Refills: 0 | Status: SHIPPED | OUTPATIENT
Start: 2022-11-23

## 2022-11-23 NOTE — PATIENT INSTRUCTIONS
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     Physical Exam:  (Anatomic Location); (Size and Morphological Description); (Differential Diagnosis):  Palms: scattered scaly thin plaques on palms with pustules  Face, neck: skin colored papules and ill defined erythema  Pertinent Positives:  Pertinent Negatives: Additional History of Present Condition: 39 yr old female with PMH Crohn's ileitis who had been on azathioprie for many years, now transition to Humira  She reports a pruritic rash on the hands that started two months ago and is now progressing to the neck  She has been on Humira for about 8 months  Denied joint pains or family history of psoriasis  Assessment and Plan:  Based on a thorough discussion of this condition and the management approach to it (including a comprehensive discussion of the known risks, side effects and potential benefits of treatment), the patient (family) agrees to implement the following specific plan:  Hands: clobetasol 0 1% ointment twice daily for 2 weeks straight, then take 1 week break  If rash still present, start triamcinolone ointment twice daily to affected areas on palms for 2 weeks straight, then stop  Take 1 week break, then resume for 2 weeks at a time  Neck, face: start hydrocortisone 2 5% ointment twice daily for 14 days to itchy areas on face and neck           PROCEDURE NOTE:  PUNCH BIOPSY - SPECIMEN A        Performing Physician: Dr Alcala     Anatomic Location; Clinical Description with size (cm); Pre-Op Diagnosis:              Specimen A: 43 yr old with history of Crohn's on Humira with scaly plaques with central pustules on palms; 4 mm punch biopsy in formalin; right palm; ddx: dyshidrotic eczema vs  Palmoplantar pustulosis                   Anesthesia: 1% xylocaine with epi                  Topical anesthesia: None                  Indications: To indicate diagnosis and management plan       Procedure Details Patient informed of the risks (including bleeding,scaring and infection) and benefits of the procedure explained  Verbal and written informed consent obtained  The area was prepped and draped in the usual fashion  Anesthesia was obtained with 1% lidocaine with epinephrine  The skin was then stretched perpendicular to the skin tension lines and a punch biopsy to an appropriate sampling depth was obtained with a 4 mm punch with a forceps and iris scissors  Hemostasis was obtained with 4-0 vicryl x 2  sutures  Complications:  None        Specimen has been sent for review by Dermatopathology  Plan:  1  Instructed to keep the wound dry and covered for 24-48h and clean thereafter  2  Warning signs of infection were reviewed  3  Recommended that the patient use acetaminophen as needed for pain           Standard post-procedure care has been explained and has been included in written form within the patient's copy of Informed Consent  PROCEDURE NOTE:  PUNCH BIOPSY - SPECIMEN B         Performing Physician: Dr Alcala     Anatomic Location; Clinical Description with size (cm); Pre-Op Diagnosis:              Specimen B: 39 yr old with pruritic skin colored papules and ill defined erythema on face and neck ongoing for a week; 4 mm punch biopsy; right posterior neck; ddx; allergic contact dermatitis vs  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                   Anesthesia: 1% xylocaine with epi                  Topical anesthesia: None                  Indications: To indicate diagnosis and management plan  Procedure Details      Patient informed of the risks (including bleeding,scaring and infection) and benefits of the procedure explained  Verbal and written informed consent obtained  The area was prepped and draped in the usual fashion  Anesthesia was obtained with 1% lidocaine with epinephrine   The skin was then stretched perpendicular to the skin tension lines and a punch biopsy to an appropriate sampling depth was obtained with a 4 mm punch with a forceps and iris scissors  Hemostasis was obtained with 4-0 vicryl x 2 sutures  Complications:  None        Specimen has been sent for review by Dermatopathology  Plan:  1  Instructed to keep the wound dry and covered for 24-48h and clean thereafter  2  Warning signs of infection were reviewed  3  Recommended that the patient use acetaminophen as needed for pain           Standard post-procedure care has been explained and has been included in written form within the patient's copy of Informed Consent

## 2022-11-23 NOTE — PROGRESS NOTES
[facility] Note     Patient Name: Kat Rush  Encounter Date: 10/23/22     Have you been cared for by a [place] [address] Dermatologist in the last 3 years and, if so, which description applies to you? NO  I am considered a "new" patient and must complete all patient intake questions  I am FEMALE/of child-bearing potential     REVIEW OF SYSTEMS:  Have you recently had or currently have any of the following? · Recent fever or chills? No  · Any non-healing wound? No  · Are you pregnant or planning to become pregnant? No  · Are you currently or planning to be nursing or breast feeding? No   PAST MEDICAL HISTORY:  Have you personally ever had or currently have any of the following? If "YES," then please provide more detail  · Skin cancer (such as Melanoma, Basal Cell Carcinoma, Squamous Cell Carcinoma? No  · Tuberculosis, HIV/AIDS, Hepatitis B or C: No  · Systemic Immunosuppression such as Diabetes, Biologic or Immunotherapy, Chemotherapy, Organ Transplantation, Bone Marrow Transplantation No  · Radiation Treatment No   FAMILY HISTORY:  Any "first degree relatives" (parent, brother, sister, or child) with the following? • Skin Cancer, Pancreatic or Other Cancer? No   PATIENT EXPERIENCE:    • Do you want the Dermatologist to perform a COMPLETE skin exam today including a clinical examination under the "bra and underwear" areas? Yes  • If necessary, do we have your permission to call and leave a detailed message on your Preferred Phone number that includes your specific medical information? Yes      Allergies   Allergen Reactions   • Epinephrine Other (See Comments)     numbness      Current Outpatient Medications:   •  Humira Pen 40 MG/0 4ML PNKT, Inject 1 pen (40 mg) under the skin once every 14 days  , Disp: 2 each, Rfl: 10  •  hydrocortisone (ANUSOL-HC) 2 5 % rectal cream, Apply topically 2 (two) times a day Apply topically 2 times daily for 7-10 days, Disp: 28 g, Rfl: 0  •  ketoconazole (NIZORAL) 2 % cream, Apply topically daily, Disp: 15 g, Rfl: 1  •  ketoconazole (NIZORAL) 2 % shampoo, Apply 1 application topically 2 (two) times a week, Disp: 100 mL, Rfl: 0  •  Multiple Vitamins-Minerals (MULTIVITAMIN ADULT PO), Daily, Disp: , Rfl:   •  PREBIOTIC PRODUCT PO, Take by mouth, Disp: , Rfl:   •  Probiotic Product (PROBIOTIC-10 PO), Probiotic  1 tab daily, Disp: , Rfl:   •  ciprofloxacin-dexamethasone (CIPRODEX) otic suspension, Administer 4 drops to the right ear 2 (two) times a day (Patient not taking: Reported on 11/16/2022), Disp: 7 5 mL, Rfl: 0  •  COLLAGEN PO, Take by mouth (Patient not taking: Reported on 11/23/2022), Disp: , Rfl:   •  escitalopram (LEXAPRO) 10 mg tablet, Take 1 tablet (10 mg total) by mouth daily (Patient taking differently: Take 5 mg by mouth daily), Disp: 90 tablet, Rfl: 2  •  hydrocortisone (ANUSOL-HC) 25 mg suppository, Insert 1 suppository (25 mg total) into the rectum 2 (two) times a day (Patient not taking: Reported on 11/16/2022), Disp: 30 suppository, Rfl: 3  •  lidocaine (LIDODERM) 5 %, Apply 1 patch topically daily Remove & Discard patch within 12 hours or as directed by MD (Patient not taking: Reported on 11/23/2022), Disp: 10 patch, Rfl: 0  •  Tranexamic Acid 650 MG TABS, Take 2 tablets (1,300 mg total) by mouth 3 (three) times a day (Patient not taking: Reported on 11/23/2022), Disp: 5 tablet, Rfl: 4          • Whom besides the patient is providing clinical information about today's encounter?   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    Physical Exam:  • (Anatomic Location); (Size and Morphological Description); (Differential Diagnosis):  Palms: scattered scaly thin plaques on palms with pustules  Face, neck: skin colored papules and ill defined erythema  • Pertinent Positives:  • Pertinent Negatives: Additional History of Present Condition: 39 yr old female with PMH Crohn's ileitis who had been on azathioprie for many years, now transition to Humira  She reports a pruritic rash on the hands that started two months ago and is now progressing to the neck  She has been on Humira for about 8 months  Denied joint pains or family history of psoriasis  Assessment and Plan:  Based on a thorough discussion of this condition and the management approach to it (including a comprehensive discussion of the known risks, side effects and potential benefits of treatment), the patient (family) agrees to implement the following specific plan:  • Hands: clobetasol 0 1% ointment twice daily for 2 weeks straight, then take 1 week break  If rash still present, start triamcinolone ointment twice daily to affected areas on palms for 2 weeks straight, then stop  Take 1 week break, then resume for 2 weeks at a time  • Neck, face: start hydrocortisone 2 5% ointment twice daily for 14 days to itchy areas on face and neck        PROCEDURE NOTE:  PUNCH BIOPSY - SPECIMEN A      Performing Physician: Dr Alcala    Anatomic Location; Clinical Description with size (cm); Pre-Op Diagnosis:   •  Specimen A: 43 yr old with history of Crohn's on Humira with scaly plaques with central pustules on palms; 4 mm punch biopsy in formalin; right palm; ddx: dyshidrotic eczema vs  Palmoplantar pustulosis       Anesthesia: 1% xylocaine with epi       Topical anesthesia: None       Indications: To indicate diagnosis and management plan      Procedure Details     Patient informed of the risks (including bleeding,scaring and infection) and benefits of the procedure explained  Verbal and written informed consent obtained  The area was prepped and draped in the usual fashion  Anesthesia was obtained with 1% lidocaine with epinephrine  The skin was then stretched perpendicular to the skin tension lines and a punch biopsy to an appropriate sampling depth was obtained with a 4 mm punch with a forceps and iris scissors  Hemostasis was obtained with 4-0 vicryl x 2  sutures  Complications:  None      Specimen has been sent for review by Dermatopathology  Plan:  1  Instructed to keep the wound dry and covered for 24-48h and clean thereafter  2  Warning signs of infection were reviewed  3  Recommended that the patient use acetaminophen as needed for pain        Standard post-procedure care has been explained and has been included in written form within the patient's copy of Informed Consent  PROCEDURE NOTE:  PUNCH BIOPSY - SPECIMEN B       Performing Physician: Dr Alcala    Anatomic Location; Clinical Description with size (cm); Pre-Op Diagnosis:   •  Specimen B: 39 yr old with pruritic skin colored papules and ill defined erythema on face and neck ongoing for a week; 4 mm punch biopsy; right posterior neck; ddx; allergic contact dermatitis vs  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       Anesthesia: 1% xylocaine with epi       Topical anesthesia: None       Indications: To indicate diagnosis and management plan  Procedure Details     Patient informed of the risks (including bleeding,scaring and infection) and benefits of the procedure explained  Verbal and written informed consent obtained  The area was prepped and draped in the usual fashion  Anesthesia was obtained with 1% lidocaine with epinephrine  The skin was then stretched perpendicular to the skin tension lines and a punch biopsy to an appropriate sampling depth was obtained with a 4 mm punch with a forceps and iris scissors       Hemostasis was obtained with 4-0 vicryl x 2 sutures  Complications:  None      Specimen has been sent for review by Dermatopathology  Plan:  1  Instructed to keep the wound dry and covered for 24-48h and clean thereafter  2  Warning signs of infection were reviewed  3  Recommended that the patient use acetaminophen as needed for pain        Standard post-procedure care has been explained and has been included in written form within the patient's copy of Informed Consent  REACTION TO EPINEPHRINE DURING BIOPSY    Patient lost ability to move distal arm upon administration of epinephrine  Within about 15 minutes, she regained motor ability  Vitals were checked  Would consider this an epinephrine intolerance rather than an allergy  Vital signs stable at end of visit

## 2022-11-29 ENCOUNTER — OFFICE VISIT (OUTPATIENT)
Dept: GASTROENTEROLOGY | Facility: CLINIC | Age: 42
End: 2022-11-29

## 2022-11-29 VITALS
SYSTOLIC BLOOD PRESSURE: 121 MMHG | HEART RATE: 90 BPM | OXYGEN SATURATION: 100 % | BODY MASS INDEX: 23.16 KG/M2 | HEIGHT: 60 IN | TEMPERATURE: 97.9 F | DIASTOLIC BLOOD PRESSURE: 84 MMHG | WEIGHT: 118 LBS

## 2022-11-29 DIAGNOSIS — K50.00 CROHN'S DISEASE INVOLVING TERMINAL ILEUM (HCC): Primary | ICD-10-CM

## 2022-11-29 NOTE — PROGRESS NOTES
[facility]  Camila Mace 43 y o  female MRN: [id number]            Assessment & Plan:  Pleasant 49-year-old female with a longstanding history of Crohn's ileitis, previously maintained on azathioprine, due to potential long-term side effects weeks transitioned her to adalimumab which she had done well with for several months now developed a rash on her palms, as well as her neck and chest   Seen by Dermatology, biopsies are completed, will await dermatology input    1  Crohn's ileitis:  -ideally would like to keep her on a biologic agent, however she may be developing response to anti TNF agents  -will await and will contact Dermatology for further recommendations with regards to if the lesions on her polyp over a result of TNF agent  -if so would recommend stopping anti TNF agent, consider other biologic agents versus returning to azathioprine given the small risk of underlying malignancies including lymphoma  For now continue Humira until this is further loose sedated, will contact  colleagues in the IBD department as well as dermatology  -we will check laboratory studies for now      Kalani Presume was seen today for follow-up  Diagnoses and all orders for this visit:    Crohn's disease involving terminal ileum ([facility] [address] )  -     Basic metabolic panel; Future  -     CBC and differential; Future  -     C-reactive protein; Future  -     Hepatic function panel; Future            _____________________________________________________________        CC: Follow-up    HPI:  Camila Mace is a 43 y  o female who is here for follow-up of Crohn's ileitis  This is a pleasant 49-year-old female with a longstanding history of Crohn's ileitis, she had been maintained on azathioprine alone for many years, we had discussed with her on several visits in the past transitioning to a biologic agent because of small risk of increased malignancy with azathioprine    Ultimately she had agreed in we transitioned her to adalimumab last spring which she had done very well with clinically for several months however in the last to the med she started to develop a rash on her palms, around her neck and chest   She was seen by Dermatology, recently had biopsies  The biopsy resorts are in the system, will discuss with Dermatology exactly with the clinical significance of these but they seem to be associated with her anti TNF agents  She reports from a GI standpoint she is doing well with regular bowel movements, denies any abdominal pain, denies any diarrhea, denies any arthralgias  The rash unfortunately has been fairly persistent and bothersome to the patient    She denies any NSAID use  She had returned from vacation in [place] where she had brought her daughter to for several months  She is currently working part-time, taking courses in preparation to apply for [employer] school  She reports some increasing stressors at home, hers 's job apparently is also under risk as well  ROS:  The remainder of the ROS was negative except for the pertinent positives mentioned in HPI  Allergies: Epinephrine    Medications:   Current Outpatient Medications:   •  clobetasol (TEMOVATE) 0 05 % ointment, Apply topically 2 (two) times a day Apply twice daily for 14 days straight to palms, then stop, Disp: 30 g, Rfl: 0  •  escitalopram (LEXAPRO) 10 mg tablet, Take 1 tablet (10 mg total) by mouth daily (Patient taking differently: Take 5 mg by mouth daily), Disp: 90 tablet, Rfl: 2  •  Humira Pen 40 MG/0 4ML PNKT, Inject 1 pen (40 mg) under the skin once every 14 days  , Disp: 2 each, Rfl: 10  •  hydrocortisone (ANUSOL-HC) 2 5 % rectal cream, Apply topically 2 (two) times a day Apply topically 2 times daily for 7-10 days, Disp: 28 g, Rfl: 0  •  hydrocortisone 2 5 % ointment, Apply topically 2 (two) times a day Apply twice daily for 14 days to affected areas on face and neck, then stop , Disp: 30 g, Rfl: 0  •  ketoconazole (NIZORAL) 2 % cream, Apply topically daily, Disp: 15 g, Rfl: 1  •  Multiple Vitamins-Minerals (MULTIVITAMIN ADULT PO), Daily, Disp: , Rfl:   •  PREBIOTIC PRODUCT PO, Take by mouth, Disp: , Rfl:   •  Probiotic Product (PROBIOTIC-10 PO), Probiotic  1 tab daily, Disp: , Rfl:   •  triamcinolone (KENALOG) 0 1 % ointment, Apply topically 2 (two) times a day Apply to hands for 2 weeks at a time with one week inbetween (after using clobetasol for 2 weeks), Disp: 30 g, Rfl: 0  •  ciprofloxacin-dexamethasone (CIPRODEX) otic suspension, Administer 4 drops to the right ear 2 (two) times a day (Patient not taking: Reported on 2022), Disp: 7 5 mL, Rfl: 0  •  COLLAGEN PO, Take by mouth (Patient not taking: Reported on 2022), Disp: , Rfl:   •  hydrocortisone (ANUSOL-HC) 25 mg suppository, Insert 1 suppository (25 mg total) into the rectum 2 (two) times a day (Patient not taking: Reported on 2022), Disp: 30 suppository, Rfl: 3  •  ketoconazole (NIZORAL) 2 % shampoo, Apply 1 application topically 2 (two) times a week (Patient not taking: Reported on 2022), Disp: 100 mL, Rfl: 0  •  lidocaine (LIDODERM) 5 %, Apply 1 patch topically daily Remove & Discard patch within 12 hours or as directed by MD (Patient not taking: Reported on 2022), Disp: 10 patch, Rfl: 0  •  Tranexamic Acid 650 MG TABS, Take 2 tablets (1,300 mg total) by mouth 3 (three) times a day (Patient not taking: Reported on 2022), Disp: 5 tablet, Rfl: 4    Past Medical History:   Diagnosis Date   • Anxiety    • Autoimmune disorder ([address] )    • COVID 2022    fever   • Crohn disease ([address] )    • Ear infection        Past Surgical History:   Procedure Laterality Date   •  SECTION     • COLONOSCOPY     • FACIAL SCAR REVISION Right 2022    Procedure: SCAR REVISION OF RIGHT FOREHEAD AND RIGHT NASOLABIAL FOLD;  Surgeon: Angelito Alford MD;  [facility];  Service: Plastics   • NV ADJ TISS XFER HEAD,FAC,HAND 10 1-30 SQCM Right 2022 Procedure: COMPLEX CLOSURE VERSUS ZPLASTY;  Surgeon: Conner Ng MD;  [facility];  Service: Plastics   • UPPER GASTROINTESTINAL ENDOSCOPY         Family History   Problem Relation Age of Onset   • Obesity Mother    • Hypertension Mother    • Obesity Father    • Hypertension Father    • Colon cancer Neg Hx    • Liver disease Neg Hx    • Breast cancer Neg Hx         reports that she has never smoked  She has never used smokeless tobacco  She reports current alcohol use of about 2 0 standard drinks per week  She reports that she does not use drugs        Physical Exam:    /84 (BP Location: Left arm, Patient Position: Sitting, Cuff Size: Standard)   Pulse 90   Temp 97 9 °F (36 6 °C)   Ht 5' (1 524 m)   Wt 53 5 kg (118 lb)   SpO2 100%   BMI 23 05 kg/m²     Gen: wn/wd, NAD, healthy-appearing female  HEENT: anicteric, MMM, no cervical LAD  CVS: RRR, no m/r/g  CHEST: CTA b/l  ABD: +BS, soft, NT,ND, no hepatosplenomegaly  EXT: no c/c/e  NEURO: aaox3  SKIN:  Plaque-like rash on her palms

## 2022-11-29 NOTE — LETTER
November 29, 2022     Thanh Wolfe DO    Patient: Tessy Velazquez   YOB: 1980   Date of Visit: 11/29/2022       Dear Dr Cooper Cogan: Thank you for referring Tessy Velazquez to me for evaluation  Below are my notes for this consultation  If you have questions, please do not hesitate to call me  I look forward to following your patient along with you  Sincerely,        Britt Barton MD        CC: No Recipients  Britt Barton MD  11/29/2022  1:03 PM  Sign when Signing Visit  [facility]) [facility]  Tessy Velazquez 43 y o  female MRN: [id number]            Assessment & Plan:  Pleasant 69-year-old female with a longstanding history of Crohn's ileitis, previously maintained on azathioprine, due to potential long-term side effects weeks transitioned her to adalimumab which she had done well with for several months now developed a rash on her palms, as well as her neck and chest   Seen by Dermatology, biopsies are completed, will await dermatology input    1  Crohn's ileitis:  -ideally would like to keep her on a biologic agent, however she may be developing response to anti TNF agents  -will await and will contact Dermatology for further recommendations with regards to if the lesions on her polyp over a result of TNF agent  -if so would recommend stopping anti TNF agent, consider other biologic agents versus returning to azathioprine given the small risk of underlying malignancies including lymphoma  For now continue Humira until this is further loose sedated, will contact  colleagues in the IBD department as well as dermatology  -we will check laboratory studies for now      Jamey Agarwal was seen today for follow-up  Diagnoses and all orders for this visit:    Crohn's disease involving terminal ileum ([facility] [address] )  -     Basic metabolic panel; Future  -     CBC and differential; Future  -     C-reactive protein;  Future  -     Hepatic function panel; Future            _____________________________________________________________        CC: Follow-up    HPI:  Moises Baltazar is a 43 y  o female who is here for follow-up of Crohn's ileitis  This is a pleasant 27-year-old female with a longstanding history of Crohn's ileitis, she had been maintained on azathioprine alone for many years, we had discussed with her on several visits in the past transitioning to a biologic agent because of small risk of increased malignancy with azathioprine  Ultimately she had agreed in we transitioned her to adalimumab last spring which she had done very well with clinically for several months however in the last to the med she started to develop a rash on her palms, around her neck and chest   She was seen by Dermatology, recently had biopsies  The biopsy resorts are in the system, will discuss with Dermatology exactly with the clinical significance of these but they seem to be associated with her anti TNF agents  She reports from a GI standpoint she is doing well with regular bowel movements, denies any abdominal pain, denies any diarrhea, denies any arthralgias  The rash unfortunately has been fairly persistent and bothersome to the patient    She denies any NSAID use  She had returned from vacation in [place] where she had brought her daughter to for several months  She is currently working part-time, taking courses in preparation to apply for [employer]  She reports some increasing stressors at home, hers 's job apparently is also under risk as well  ROS:  The remainder of the ROS was negative except for the pertinent positives mentioned in HPI        Allergies: Epinephrine    Medications:   Current Outpatient Medications:   •  clobetasol (TEMOVATE) 0 05 % ointment, Apply topically 2 (two) times a day Apply twice daily for 14 days straight to palms, then stop, Disp: 30 g, Rfl: 0  •  escitalopram (LEXAPRO) 10 mg tablet, Take 1 tablet (10 mg total) by mouth daily (Patient taking differently: Take 5 mg by mouth daily), Disp: 90 tablet, Rfl: 2  •  Humira Pen 40 MG/0 4ML PNKT, Inject 1 pen (40 mg) under the skin once every 14 days  , Disp: 2 each, Rfl: 10  •  hydrocortisone (ANUSOL-HC) 2 5 % rectal cream, Apply topically 2 (two) times a day Apply topically 2 times daily for 7-10 days, Disp: 28 g, Rfl: 0  •  hydrocortisone 2 5 % ointment, Apply topically 2 (two) times a day Apply twice daily for 14 days to affected areas on face and neck, then stop , Disp: 30 g, Rfl: 0  •  ketoconazole (NIZORAL) 2 % cream, Apply topically daily, Disp: 15 g, Rfl: 1  •  Multiple Vitamins-Minerals (MULTIVITAMIN ADULT PO), Daily, Disp: , Rfl:   •  PREBIOTIC PRODUCT PO, Take by mouth, Disp: , Rfl:   •  Probiotic Product (PROBIOTIC-10 PO), Probiotic  1 tab daily, Disp: , Rfl:   •  triamcinolone (KENALOG) 0 1 % ointment, Apply topically 2 (two) times a day Apply to hands for 2 weeks at a time with one week inbetween (after using clobetasol for 2 weeks), Disp: 30 g, Rfl: 0  •  ciprofloxacin-dexamethasone (CIPRODEX) otic suspension, Administer 4 drops to the right ear 2 (two) times a day (Patient not taking: Reported on 11/16/2022), Disp: 7 5 mL, Rfl: 0  •  COLLAGEN PO, Take by mouth (Patient not taking: Reported on 11/23/2022), Disp: , Rfl:   •  hydrocortisone (ANUSOL-HC) 25 mg suppository, Insert 1 suppository (25 mg total) into the rectum 2 (two) times a day (Patient not taking: Reported on 11/16/2022), Disp: 30 suppository, Rfl: 3  •  ketoconazole (NIZORAL) 2 % shampoo, Apply 1 application topically 2 (two) times a week (Patient not taking: Reported on 11/29/2022), Disp: 100 mL, Rfl: 0  •  lidocaine (LIDODERM) 5 %, Apply 1 patch topically daily Remove & Discard patch within 12 hours or as directed by MD (Patient not taking: Reported on 11/23/2022), Disp: 10 patch, Rfl: 0  •  Tranexamic Acid 650 MG TABS, Take 2 tablets (1,300 mg total) by mouth 3 (three) times a day (Patient not taking: Reported on 2022), Disp: 5 tablet, Rfl: 4    Past Medical History:   Diagnosis Date   • Anxiety    • Autoimmune disorder ([facility] )    • COVID 2022    fever   • Crohn disease ([facility] )    • Ear infection        Past Surgical History:   Procedure Laterality Date   •  SECTION     • COLONOSCOPY     • FACIAL SCAR REVISION Right 2022    Procedure: SCAR REVISION OF RIGHT FOREHEAD AND RIGHT NASOLABIAL FOLD;  Surgeon: Maury Sagastume MD;  [facility];  Service: Plastics   • CO ADJ TISS XFER HEAD,FAC,HAND 10 1-30 SQCM Right 2022    Procedure: COMPLEX CLOSURE Amarilis Yogesh;  Surgeon: Maury Sagastume MD;  [facility];  Service: Plastics   • UPPER GASTROINTESTINAL ENDOSCOPY         Family History   Problem Relation Age of Onset   • Obesity Mother    • Hypertension Mother    • Obesity Father    • Hypertension Father    • Colon cancer Neg Hx    • Liver disease Neg Hx    • Breast cancer Neg Hx         reports that she has never smoked  She has never used smokeless tobacco  She reports current alcohol use of about 2 0 standard drinks per week  She reports that she does not use drugs        Physical Exam:    /84 (BP Location: Left arm, Patient Position: Sitting, Cuff Size: Standard)   Pulse 90   Temp 97 9 °F (36 6 °C)   Ht 5' (1 524 m)   Wt 53 5 kg (118 lb)   SpO2 100%   BMI 23 05 kg/m²     Gen: wn/wd, NAD, healthy-appearing female  HEENT: anicteric, MMM, no cervical LAD  CVS: RRR, no m/r/g  CHEST: CTA b/l  ABD: +BS, soft, NT,ND, no hepatosplenomegaly  EXT: no c/c/e  NEURO: aaox3  SKIN:  Plaque-like rash on her palms

## 2022-11-30 NOTE — RESULT ENCOUNTER NOTE
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  Rash on neck is also likely secondary to Humira  She expressed that the rash on th face/neck has resolved with hydrocortisone, but her pustules remain on her hands  I counseled that it is ok to use the clobetasol on the hands for 3 weeks straight, after which she can take a break and switch to triamcinolone  Dr Keena Murillo was informed that Danielle Mule would be a better alternative; he expressed understanding through Doctor Marin Castellanos  Instructions for Clinical Derm Team:   (remember to route Result Note to appropriate staff):    Call patient and schedule for 3 month appointment at [facility]  Result & Plan by Specimen:    Specimen A: palmoplantar pustulosis  Plan: as above; GI to potentially discontinue Humira and choose alternative for Crohn's      Specimen B: hypersensitivity reaction   Plan: resolved    Tissue Exam: [id number]  Order: [id number]  Visible to patient: Yes (seen)     Dx: Rash     3 Result Notes  Component   Case Report  Surgical Pathology Report                         Case: [id number]                                   Authorizing Provider: Simran Richard MD            Collected:           11/23/2022 1254              Ordering Location:     [place] [id number]      Received:            11/23/2022 1254                                     [facility]                                                          Pathologist:           Reina Tidwell MD                                                           Specimens:   A) - Skin, Other, A: right palm                                                                     B) - Skin, Other, B: right posterior neck                                                Final Diagnosis  A   Skin, right palm, biopsy:     Intraepidermal neutrophilic pustule (see note)      Note: In the appropriate clinical context, 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  Clinical pathologic correlation is advised  Pathogenic microorganisms are not seen with PAS stain  Multiple levels examined        B  Skin, right posterior neck, biopsy:     Focal interface dermatitis and superficial perivascular lymphocytic infiltrate with numerous eosinophils (see note)      Note: In the appropriate clinical context, the histopathologic findings are compatible with hypersensitivity reaction (e g , to a drug)  Clinical pathologic correlation is advised  Pathogenic microorganisms are not seen with PAS stain  Multiple levels examined        Electronically signed by Sarah Beth Leung MD on 11/28/2022 at  4:02 PM  Additional Information   All reported additional testing was performed with appropriately reactive controls   These tests were developed and their performance characteristics determined by [facility] or appropriate performing facility, though some tests may be performed on tissues which have not been validated for performance characteristics (such as staining performed on alcohol exposed cell blocks and decalcified tissues)   Results should be interpreted with caution and in the context of the patients' clinical condition  These tests may not be cleared or approved by the [place]  Food and Drug Administration, though the FDA has determined that such clearance or approval is not necessary  These tests are used for clinical purposes and they should not be regarded as investigational or for research  This laboratory has been approved by CLIA 88, designated as a high-complexity laboratory and is qualified to perform these tests  Keke Palacios Description   A  The specimen is received in formalin, labeled with the patient's name and hospital number, and is designated "right palm"    The specimen consists of a 0 4 x 0 4 cm punch biopsy of tan skin excised to a depth of 0 4 cm  The epithelial surface appears keratotic and is inked red  The margin of resection is inked green  The specimen is bisected revealing grossly unremarkable cut surfaces  The specimen is entirely submitted between sponges, 1 cassette          B  The specimen is received in formalin, labeled with the patient's name and hospital number, and is designated "right posterior neck"  The specimen consists of a 0 4 x 0 3 x 0 2 punch biopsy of tan-brown skin  The presumed epithelial surface appears keratotic and is inked red  The presumed margin of resection is inked green  The specimen is bisected revealing grossly unremarkable cut surfaces  The specimen is entirely submitted between sponges, 1 cassette  Note: The estimated total formalin fixation time based upon information provided by the submitting clinician and the standard processing schedule is under 72 hours    Carlie Uribe       Clinical Information   [facility]     Specimen A: 43 yr old with history of Crohn's on Humira with scaly plaques with central pustules on palms; 4 mm punch biopsy in formalin; right palm; ddx: dyshidrotic eczema vs  Palmoplantar pustulosis     Specimen B: 43 yr old with pruritic skin colored papules and ill defined erythema on face and neck ongoing for a week; 4 mm punch biopsy; right posterior neck; ddx; eczematous dermatitis vs  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  Resulting Agency [facility]          Specimen Collected: 11/23/22 12:54 PM Last Resulted: 11/28/22  4:03 PM     Order Details     View Encounter     Lab and Collection Details     Routing     Result History    View Encounter Conversation        Scans on Order [id number]    Lab Result Document - Document on 11/28/2022  4:03 PM

## 2022-12-29 ENCOUNTER — TELEPHONE (OUTPATIENT)
Dept: GASTROENTEROLOGY | Facility: CLINIC | Age: 42
End: 2022-12-29

## 2023-03-03 ENCOUNTER — TELEPHONE (OUTPATIENT)
Dept: OTHER | Facility: OTHER | Age: 43
End: 2023-03-03

## 2023-03-03 ENCOUNTER — APPOINTMENT (OUTPATIENT)
Dept: LAB | Facility: CLINIC | Age: 43
End: 2023-03-03

## 2023-03-03 DIAGNOSIS — K50.00 CROHN'S DISEASE INVOLVING TERMINAL ILEUM (HCC): Primary | ICD-10-CM

## 2023-03-03 DIAGNOSIS — K50.00 CROHN'S DISEASE INVOLVING TERMINAL ILEUM (HCC): ICD-10-CM

## 2023-03-03 LAB
ALBUMIN SERPL BCP-MCNC: 4.3 G/DL (ref 3.5–5)
ALP SERPL-CCNC: 86 U/L (ref 34–104)
ALT SERPL W P-5'-P-CCNC: 30 U/L (ref 7–52)
ANION GAP SERPL CALCULATED.3IONS-SCNC: 8 MMOL/L (ref 4–13)
AST SERPL W P-5'-P-CCNC: 26 U/L (ref 13–39)
BASOPHILS # BLD AUTO: 0.04 THOUSANDS/ÂΜL (ref 0–0.1)
BASOPHILS NFR BLD AUTO: 0 % (ref 0–1)
BILIRUB DIRECT SERPL-MCNC: 0.12 MG/DL (ref 0–0.2)
BILIRUB SERPL-MCNC: 0.65 MG/DL (ref 0.2–1)
BUN SERPL-MCNC: 8 MG/DL (ref 5–25)
CALCIUM SERPL-MCNC: 8.9 MG/DL (ref 8.4–10.2)
CHLORIDE SERPL-SCNC: 104 MMOL/L (ref 96–108)
CO2 SERPL-SCNC: 24 MMOL/L (ref 21–32)
CREAT SERPL-MCNC: 0.57 MG/DL (ref 0.6–1.3)
CRP SERPL QL: 6.4 MG/L
EOSINOPHIL # BLD AUTO: 0.17 THOUSAND/ÂΜL (ref 0–0.61)
EOSINOPHIL NFR BLD AUTO: 2 % (ref 0–6)
ERYTHROCYTE [DISTWIDTH] IN BLOOD BY AUTOMATED COUNT: 11.9 % (ref 11.6–15.1)
GFR SERPL CREATININE-BSD FRML MDRD: 114 ML/MIN/1.73SQ M
GLUCOSE SERPL-MCNC: 83 MG/DL (ref 65–140)
HCT VFR BLD AUTO: 35.5 % (ref 34.8–46.1)
HGB BLD-MCNC: 11.4 G/DL (ref 11.5–15.4)
IMM GRANULOCYTES # BLD AUTO: 0.05 THOUSAND/UL (ref 0–0.2)
IMM GRANULOCYTES NFR BLD AUTO: 1 % (ref 0–2)
LYMPHOCYTES # BLD AUTO: 1.63 THOUSANDS/ÂΜL (ref 0.6–4.47)
LYMPHOCYTES NFR BLD AUTO: 17 % (ref 14–44)
MCH RBC QN AUTO: 28.7 PG (ref 26.8–34.3)
MCHC RBC AUTO-ENTMCNC: 32.1 G/DL (ref 31.4–37.4)
MCV RBC AUTO: 89 FL (ref 82–98)
MONOCYTES # BLD AUTO: 0.56 THOUSAND/ÂΜL (ref 0.17–1.22)
MONOCYTES NFR BLD AUTO: 6 % (ref 4–12)
NEUTROPHILS # BLD AUTO: 7.17 THOUSANDS/ÂΜL (ref 1.85–7.62)
NEUTS SEG NFR BLD AUTO: 74 % (ref 43–75)
NRBC BLD AUTO-RTO: 0 /100 WBCS
PLATELET # BLD AUTO: 288 THOUSANDS/UL (ref 149–390)
PMV BLD AUTO: 11.4 FL (ref 8.9–12.7)
POTASSIUM SERPL-SCNC: 3.7 MMOL/L (ref 3.5–5.3)
PROT SERPL-MCNC: 7.4 G/DL (ref 6.4–8.4)
RBC # BLD AUTO: 3.97 MILLION/UL (ref 3.81–5.12)
SODIUM SERPL-SCNC: 136 MMOL/L (ref 135–147)
WBC # BLD AUTO: 9.62 THOUSAND/UL (ref 4.31–10.16)

## 2023-03-03 RX ORDER — BUDESONIDE 3 MG/1
9 CAPSULE, COATED PELLETS ORAL DAILY
Qty: 90 CAPSULE | Refills: 2 | Status: SHIPPED | OUTPATIENT
Start: 2023-03-03 | End: 2023-06-27 | Stop reason: ALTCHOICE

## 2023-03-10 ENCOUNTER — OFFICE VISIT (OUTPATIENT)
Dept: FAMILY MEDICINE CLINIC | Facility: CLINIC | Age: 43
End: 2023-03-10

## 2023-03-10 VITALS
HEART RATE: 76 BPM | BODY MASS INDEX: 24.11 KG/M2 | RESPIRATION RATE: 18 BRPM | SYSTOLIC BLOOD PRESSURE: 122 MMHG | WEIGHT: 122.8 LBS | TEMPERATURE: 97.6 F | OXYGEN SATURATION: 99 % | DIASTOLIC BLOOD PRESSURE: 90 MMHG | HEIGHT: 60 IN

## 2023-03-10 DIAGNOSIS — F41.9 ANXIETY: Primary | ICD-10-CM

## 2023-03-10 RX ORDER — HYDROXYZINE HYDROCHLORIDE 25 MG/1
25 TABLET, FILM COATED ORAL EVERY 6 HOURS PRN
Qty: 30 TABLET | Refills: 0 | Status: SHIPPED | OUTPATIENT
Start: 2023-03-10

## 2023-03-10 NOTE — PROGRESS NOTES
Name: Kenton Bruno      : 1980      MRN: [id number]  Encounter Provider: Dorina Eng DO  Encounter Date: 3/10/2023   Encounter department: [facility] [address] [place] [address]    Assessment & Plan     1  Anxiety  Assessment & Plan:  Patient is 42 yo F presenting with worsening anxiety attacks  Patient has been having worsening anxiety recently and feelings of restlessness due to aggravating life stressors recently  Patient is currently on lexapro 10mg but feels that she needs something for anxiety attacks  She is able to sleep without issues on most nights  Patient has good coping skills with doing yoga and exercises in the morning  Plan:  -will prescribe hydroxyzine 25mg PO q6h PRN for anxiety attacks  -continue lexapro 10 mg PO daily for anxiety   -continue exercise/morning yoga for relaxation  -hold off on prescribing medication for sleep since patient reports good sleep  -f/u in 1 month for anxiety     Orders:  -     hydrOXYzine HCL (ATARAX) 25 mg tablet; Take 1 tablet (25 mg total) by mouth every 6 (six) hours as needed for anxiety         Subjective      Patient is 42 yo F who is presenting for worsening anxiety attacks  Patient reports that has had 4-5 anxiety attacks in the last 4-5 weeks  She reports her last attack was last night and is still feeling restless from it today  Her attacks consist of feeling restless, a burning sensation and warmth in her body, and palpitations  She had an anxiety attack last week during a test and had a hard time concentrating and recalling information on the exam despite having studied  She reports that her feelings of anxiety and restlessness lingered throughout the rest of the day  She reports getting 6 hours of sleep each night but sometimes wakes up in the middle of the night with an anxiety attack  She tried melatonin in the past for sleep but was not helpful  She wakes up around 5am to do yoga and exercise   Patient is prescribed lexapro 10mg for anxiety but stopped taking it for 1-2 weeks because it was not helping her symptoms but restarted the medication last night  She has taken her father's prescription of clonazepam 0 5mg twice  She took half a pill last night and a full pill after her exam last week but denies daily use of clonazepam  Patient reports that she last had anxiety attacks like this years ago after she was diagnosed with postpartum depression  She has been particularly more stressed recently with work, school, and family  She goes to [employer] school and has to work on the weekends due to her  looking for a new job  Her children have been diagnosed with depression recently, which has been particularly stressful for the patient  She denies feeling depressed and is hopeful for the future  Review of Systems   Constitutional: Negative for chills and fever  HENT: Negative for ear pain and sore throat  Eyes: Negative for pain and visual disturbance  Respiratory: Negative for cough and shortness of breath  Cardiovascular: Positive for palpitations  Negative for chest pain  Gastrointestinal: Negative for abdominal pain and vomiting  Genitourinary: Negative for dysuria and hematuria  Musculoskeletal: Negative for arthralgias and back pain  Skin: Negative for color change and rash  Neurological: Negative for seizures and syncope  Psychiatric/Behavioral: The patient is nervous/anxious  All other systems reviewed and are negative        Current Outpatient Medications on File Prior to Visit   Medication Sig   • budesonide (ENTOCORT EC) 3 MG capsule Take 3 capsules (9 mg total) by mouth daily   • clobetasol (TEMOVATE) 0 05 % ointment Apply topically 2 (two) times a day Apply twice daily for 14 days straight to palms, then stop   • escitalopram (LEXAPRO) 10 mg tablet Take 1 tablet (10 mg total) by mouth daily (Patient taking differently: Take 5 mg by mouth daily)   • Humira Pen 40 MG/0 4ML PNKT Inject 1 pen (40 mg) under the skin once every 14 days  • hydrocortisone (ANUSOL-HC) 2 5 % rectal cream Apply topically 2 (two) times a day Apply topically 2 times daily for 7-10 days   • hydrocortisone (ANUSOL-HC) 25 mg suppository Insert 1 suppository (25 mg total) into the rectum 2 (two) times a day   • hydrocortisone 2 5 % ointment Apply topically 2 (two) times a day Apply twice daily for 14 days to affected areas on face and neck, then stop  • ketoconazole (NIZORAL) 2 % cream Apply topically daily   • ketoconazole (NIZORAL) 2 % shampoo Apply 1 application topically 2 (two) times a week   • lidocaine (LIDODERM) 5 % Apply 1 patch topically daily Remove & Discard patch within 12 hours or as directed by MD   • Multiple Vitamins-Minerals (MULTIVITAMIN ADULT PO) Daily   • PREBIOTIC PRODUCT PO Take by mouth   • Probiotic Product (PROBIOTIC-10 PO) Probiotic   1 tab daily   • Tranexamic Acid 650 MG TABS Take 2 tablets (1,300 mg total) by mouth 3 (three) times a day   • triamcinolone (KENALOG) 0 1 % ointment Apply topically 2 (two) times a day Apply to hands for 2 weeks at a time with one week inbetween (after using clobetasol for 2 weeks)   • ciprofloxacin-dexamethasone (CIPRODEX) otic suspension Administer 4 drops to the right ear 2 (two) times a day (Patient not taking: Reported on 3/10/2023)   • [DISCONTINUED] COLLAGEN PO Take by mouth (Patient not taking: Reported on 11/23/2022)       Objective     /90 (BP Location: Left arm, Patient Position: Sitting, Cuff Size: Standard)   Pulse 76   Temp 97 6 °F (36 4 °C) (Tympanic)   Resp 18   Ht 5' (1 524 m)   Wt 55 7 kg (122 lb 12 8 oz)   LMP  (LMP Unknown)   SpO2 99%   BMI 23 98 kg/m²     Physical Exam  Constitutional:       Appearance: Normal appearance  She is normal weight  HENT:      Head: Normocephalic and atraumatic  Cardiovascular:      Rate and Rhythm: Normal rate and regular rhythm  Heart sounds: No murmur heard    Pulmonary:      Effort: Pulmonary effort is normal       Breath sounds: Normal breath sounds  Skin:     General: Skin is warm and dry  Neurological:      Mental Status: She is alert and oriented to person, place, and time  Mental status is at baseline  Psychiatric:         Mood and Affect: Mood normal          Behavior: Behavior normal          Thought Content: Thought content normal          Judgment: Judgment normal       Comments: Anxious and restless         Rian Just Derricotte, DO

## 2023-03-10 NOTE — ASSESSMENT & PLAN NOTE
Patient is 42 yo F presenting with worsening anxiety attacks  Patient has been having worsening anxiety recently and feelings of restlessness due to aggravating life stressors recently  Patient is currently on lexapro 10mg but feels that she needs something for anxiety attacks  She is able to sleep without issues on most nights  Patient has good coping skills with doing yoga and exercises in the morning      Plan:  -will prescribe hydroxyzine 25mg PO q6h PRN for anxiety attacks  -continue lexapro 10 mg PO daily for anxiety   -continue exercise/morning yoga for relaxation  -hold off on prescribing medication for sleep since patient reports good sleep  -f/u in 1 month for anxiety

## 2023-03-21 ENCOUNTER — TELEPHONE (OUTPATIENT)
Dept: PLASTIC SURGERY | Facility: CLINIC | Age: 43
End: 2023-03-21

## 2023-03-21 NOTE — TELEPHONE ENCOUNTER
Left message to let her know we have to reschedule her appointment on 4/17/23 with Elena due to a change in schedule  Gave main number to call back [phone]

## 2023-03-31 ENCOUNTER — OFFICE VISIT (OUTPATIENT)
Dept: GASTROENTEROLOGY | Facility: CLINIC | Age: 43
End: 2023-03-31

## 2023-03-31 VITALS
HEIGHT: 60 IN | HEART RATE: 66 BPM | OXYGEN SATURATION: 100 % | WEIGHT: 120 LBS | DIASTOLIC BLOOD PRESSURE: 73 MMHG | SYSTOLIC BLOOD PRESSURE: 114 MMHG | BODY MASS INDEX: 23.56 KG/M2

## 2023-03-31 DIAGNOSIS — K50.00 CROHN'S DISEASE INVOLVING TERMINAL ILEUM (HCC): Primary | ICD-10-CM

## 2023-03-31 DIAGNOSIS — R10.13 EPIGASTRIC PAIN: ICD-10-CM

## 2023-03-31 RX ORDER — DICYCLOMINE HCL 20 MG
20 TABLET ORAL 2 TIMES DAILY PRN
Qty: 30 TABLET | Refills: 2 | Status: SHIPPED | OUTPATIENT
Start: 2023-03-31

## 2023-03-31 RX ORDER — AZATHIOPRINE 50 MG/1
50 TABLET ORAL DAILY
Qty: 90 TABLET | Refills: 3 | Status: SHIPPED | OUTPATIENT
Start: 2023-03-31

## 2023-03-31 NOTE — PATIENT INSTRUCTIONS
I recommend continuing Budesonide 9mg daily for the next month  Then decrease to 6 mg for two weeks, then decreased to 3 mg for two weeks then stop  Continue Imuran 50 mg daily  Get the stool test for fecal calprotectin around beginning of May

## 2023-03-31 NOTE — PROGRESS NOTES
[facility] - Outpatient Follow-up Note  Chepe Carrion 43 y o  female MRN: [id number]  Encounter: [id number]          ASSESSMENT AND PLAN:      Pleasant 45-year-old female with a longstanding history of Crohn's ileitis, previously maintained on azathioprine, due to potential long-term side effects weeks transitioned her to adalimumab which she had done well with for several months however developed palmar rash and followed with dermatology with biopsies concerning for reaction to TNF agent  She was off medicine for 3-months with worsening symptoms therefore started on budesonide and Imuran again  1   Crohn's ileitis  Patient was off of medicine from November to the beginning of March  She had worsening symptoms and was started on budesonide 9 mg daily  She also reports she restarted her Imuran at that time  She has been doing well with 1 formed bowel movement daily and minimal abdominal pain     -Patient would prefer to continue Imuran versus starting another biologic as she was previously on this medicine for 6 to 7 years and doing well  She is aware of the potential long-term side effects including risk of lymphoma   -Continue budesonide  We will continue 9 mg daily for the next month and taper down to 6 mg for 2 weeks and then 3 mg for 2 weeks before stopping   - Can continue Imuran 50 mg daily   -Bentyl as needed for abdominal cramping    -We will recheck routine lab work with CBC, CMP, CRP as well as vitamin D and vitamin B12 levels due to ileitis  - If hemoglobin remains low, consider iron panel as well  -Recommend obtaining fecal calprotectin in 1 month    -Patient would like to set up colonoscopy at next office visit    Would be reasonable to start this once back on Imuran for 6 months to ensure endoscopic remission     -Will send Dr Carlin Enrique message regarding patient update        Follow-up in 3 months  ______________________________________________________________________    SUBJECTIVE:      Lennox Malik is a pleasant 66-year-old female with history of Crohn's disease involving the terminal ileum who presents the office for follow-up  History of Crohn's disease previously maintained on azathioprine however due to concern for long-term side effects with switch to Humira however at last office visit then developed rash on the palms which was likely a reaction to TNF alpha inhibitor and was recommended to stop  She was off of all medication from around November to the beginning of March  She reached out to Dr Miya Matos via [employer] text due to worsening symptoms and was recommended to start budesonide 9 mg daily  She also reports having more Imuran therefore started this as well  She has been taking both of these for the past month  At this time patient is doing well  She is having 1 formed bowel movement daily  She reports occasional epigastric discomfort which is consistent with her Crohn's pain however reports this happens rarely  Tolerating diet  Recent lab work this month with normal BMP  Mildly low hemoglobin of 11 4 with normal platelets and white count  Mild elevation of CRP at 6 4  Liver enzymes normal     Last colonoscopy 2020 with mild ileitis  REVIEW OF SYSTEMS IS OTHERWISE NEGATIVE        Historical Information   Past Medical History:   Diagnosis Date   • Anxiety    • Autoimmune disorder ([facility] [address] )    • COVID 2022    fever   • Crohn disease ([facility] [address] )    • Ear infection      Past Surgical History:   Procedure Laterality Date   •  SECTION     • COLONOSCOPY     • FACIAL SCAR REVISION Right 2022    Procedure: SCAR REVISION OF RIGHT FOREHEAD AND RIGHT NASOLABIAL FOLD;  Surgeon: Fabiola Irizarry MD;  Location: [facility];  Service: Plastics   • [place] ADJT/REARGMT F/C/C/M/N/AX/G/H/F 10 1-30 0 SQ CM Right 2022    Procedure: COMPLEX CLOSURE VERSUS Kirill Sarks;  Surgeon: Soledad Dillon Destinee Pierre MD;  Location: [facility];  Service: Plastics   • UPPER GASTROINTESTINAL ENDOSCOPY       Social History   Social History     Substance and Sexual Activity   Alcohol Use Yes   • Alcohol/week: 2 0 standard drinks   • Types: 1 Glasses of wine, 1 Shots of liquor per week    Comment: weekends     Social History     Substance and Sexual Activity   Drug Use No     Social History     Tobacco Use   Smoking Status Never   Smokeless Tobacco Never     Family History   Problem Relation Age of Onset   • Obesity Mother    • Hypertension Mother    • Obesity Father    • Hypertension Father    • Colon cancer Neg Hx    • Liver disease Neg Hx    • Breast cancer Neg Hx        Meds/Allergies       Current Outpatient Medications:   •  budesonide (ENTOCORT EC) 3 MG capsule  •  ciprofloxacin-dexamethasone (CIPRODEX) otic suspension  •  clobetasol (TEMOVATE) 0 05 % ointment  •  escitalopram (LEXAPRO) 10 mg tablet  •  Humira Pen 40 MG/0 4ML PNKT  •  hydrocortisone (ANUSOL-HC) 2 5 % rectal cream  •  hydrocortisone (ANUSOL-HC) 25 mg suppository  •  hydrocortisone 2 5 % ointment  •  hydrOXYzine HCL (ATARAX) 25 mg tablet  •  ketoconazole (NIZORAL) 2 % cream  •  ketoconazole (NIZORAL) 2 % shampoo  •  lidocaine (LIDODERM) 5 %  •  Multiple Vitamins-Minerals (MULTIVITAMIN ADULT PO)  •  PREBIOTIC PRODUCT PO  •  Probiotic Product (PROBIOTIC-10 PO)  •  Tranexamic Acid 650 MG TABS  •  triamcinolone (KENALOG) 0 1 % ointment    Allergies   Allergen Reactions   • Epinephrine Other (See Comments)     numbness           Objective     There were no vitals taken for this visit  There is no height or weight on file to calculate BMI        PHYSICAL EXAM:      General Appearance:   Alert, cooperative, no distress   HEENT:   Normocephalic, atraumatic, anicteric      Neck:  Supple, symmetrical, trachea midline   Lungs:   Clear to auscultation bilaterally; no rales, rhonchi or wheezing; respirations unlabored    Heart[de-identified]   Regular rate and rhythm; no murmur, rub, or gallop  Abdomen:   + Mild tenderness to palpation in the epigastric area  Abdomen is soft and nondistended  Bowel sounds present   Genitalia:   Deferred    Rectal:   Deferred    Extremities:  No cyanosis, clubbing or edema    Pulses:  2+ and symmetric    Skin:  No jaundice, rashes, or lesions    Lymph nodes:  No palpable cervical lymphadenopathy        Lab Results:   No visits with results within 1 Day(s) from this visit  Latest known visit with results is:   Appointment on 03/03/2023   Component Date Value   • Sodium 03/03/2023 136    • Potassium 03/03/2023 3 7    • Chloride 03/03/2023 104    • CO2 03/03/2023 24    • ANION GAP 03/03/2023 8    • BUN 03/03/2023 8    • Creatinine 03/03/2023 0 57 (L)    • Glucose 03/03/2023 83    • Calcium 03/03/2023 8 9    • eGFR 03/03/2023 114    • WBC 03/03/2023 9 62    • RBC 03/03/2023 3 97    • Hemoglobin 03/03/2023 11 4 (L)    • Hematocrit 03/03/2023 35 5    • MCV 03/03/2023 89    • MCH 03/03/2023 28 7    • MCHC 03/03/2023 32 1    • RDW 03/03/2023 11 9    • MPV 03/03/2023 11 4    • Platelets 61/23/8220 288    • nRBC 03/03/2023 0    • Neutrophils Relative 03/03/2023 74    • Immat GRANS % 03/03/2023 1    • Lymphocytes Relative 03/03/2023 17    • Monocytes Relative 03/03/2023 6    • Eosinophils Relative 03/03/2023 2    • Basophils Relative 03/03/2023 0    • Neutrophils Absolute 03/03/2023 7 17    • Immature Grans Absolute 03/03/2023 0 05    • Lymphocytes Absolute 03/03/2023 1 63    • Monocytes Absolute 03/03/2023 0 56    • Eosinophils Absolute 03/03/2023 0 17    • Basophils Absolute 03/03/2023 0 04    • CRP 03/03/2023 6 4 (H)    • Total Bilirubin 03/03/2023 0 65    • Bilirubin, Direct 03/03/2023 0 12    • Alkaline Phosphatase 03/03/2023 86    • AST 03/03/2023 26    • ALT 03/03/2023 30    • Total Protein 03/03/2023 7 4    • Albumin 03/03/2023 4 3          Radiology Results:   No results found

## 2023-04-26 ENCOUNTER — OFFICE VISIT (OUTPATIENT)
Dept: MULTI SPECIALTY CLINIC | Facility: CLINIC | Age: 43
End: 2023-04-26

## 2023-04-26 VITALS — HEIGHT: 60 IN | TEMPERATURE: 97.7 F | WEIGHT: 118.4 LBS | BODY MASS INDEX: 23.25 KG/M2

## 2023-04-26 DIAGNOSIS — R21 RASH: Primary | ICD-10-CM

## 2023-04-26 NOTE — PATIENT INSTRUCTIONS
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     Physical Exam:  (Anatomic Location); (Size and Morphological Description); (Differential Diagnosis):  Palms: group of deep pinpoint pustules on plams  Pertinent Positives:  Pertinent Negatives: Additional History of Present Condition: 39 yr old female with PMH Crohn's ileitis who had been on azathioprine for many years and then transitioned to Humira, but came off of Humira in December  Patient is currently taking Imuran for her Crohn's  She reports a pruritic rash on the hands that started in September that progressed to the neck  Patient has used the clobetasol 0 1% ointment prescribed  Reports improvement while using it, but symptoms recur after stopping treatment  Patient is on budesonide per GI  -Continue budesonide per GI note: \"We will continue 9 mg daily for the next month and taper down to 6 mg for 2 weeks and then 3 mg for 2 weeks before stopping\"  She is also on Imuran 50 mg daily  Biopsy performed 11/23/2023:  Final Diagnosis  A  Skin, right palm, biopsy:     Intraepidermal neutrophilic pustule (see note)  Note: In the appropriate clinical context, 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  Clinical pathologic correlation is advised  Pathogenic microorganisms are not seen with PAS stain  Multiple levels examined  B  Skin, right posterior neck, biopsy:     Focal interface dermatitis and superficial perivascular lymphocytic infiltrate with numerous eosinophils (see note)  Note: In the appropriate clinical context, the histopathologic findings are compatible with hypersensitivity reaction (e g , to a drug)  Clinical pathologic correlation is advised  Pathogenic microorganisms are not seen with PAS stain  Multiple levels examined       Assessment and Plan:  Based on a thorough discussion of this condition and the management approach to it (including a comprehensive discussion of the known risks, " side effects and potential benefits of treatment), the patient (family) agrees to implement the following specific plan:  Continue triamcinolone 0 1% ointment twice daily for 14 days straight then stop for 1 week  Given greater immunosuppression with azathioprine, should consider starting an IL-23 inhibitor such as Risankizumab [place]) due to FDA approval for moderate to severe Crohn's disease  RTC 6 months        EPIDERMAL INCLUSION CYST    Physical Exam:  (Anatomic Location); (Size and Morphological Description); (Differential Diagnosis):  2 mm papule on left lower vermillion  Pertinent Positives:  Pertinent Negatives:     Additional History of Present Condition:  Present since November  (6 months)    Assessment and Plan:  Likely harmless lesion; counseled that this an occluded pore that may self resolve after spontaneously rupturing

## 2023-04-26 NOTE — PROGRESS NOTES
"[facility] Note     Patient Name: Chepe Carrion  Encounter Date: 04/26/2023     Have you been cared for by a [place] [address] Dermatologist in the last 3 years and, if so, which description applies to you? Yes  I have been here within the last 3 years, and my medical history has NOT changed since that time  I am FEMALE/of child-bearing potential     REVIEW OF SYSTEMS:  Have you recently had or currently have any of the following? · No changes in my recent health  PAST MEDICAL HISTORY:  Have you personally ever had or currently have any of the following? If \"YES,\" then please provide more detail  · No changes in my medical history  FAMILY HISTORY:  Any \"first degree relatives\" (parent, brother, sister, or child) with the following? • No changes in my family's known health  PATIENT EXPERIENCE:    • Do you want the Dermatologist to perform a COMPLETE skin exam today including a clinical examination under the \"bra and underwear\" areas? NO  • If necessary, do we have your permission to call and leave a detailed message on your Preferred Phone number that includes your specific medical information?   Yes      Allergies   Allergen Reactions   • Epinephrine Other (See Comments)     numbness      Current Outpatient Medications:   •  azaTHIOprine (IMURAN) 50 mg tablet, Take 1 tablet (50 mg total) by mouth daily, Disp: 90 tablet, Rfl: 3  •  budesonide (ENTOCORT EC) 3 MG capsule, Take 3 capsules (9 mg total) by mouth daily, Disp: 90 capsule, Rfl: 2  •  ciprofloxacin-dexamethasone (CIPRODEX) otic suspension, Administer 4 drops to the right ear 2 (two) times a day (Patient not taking: Reported on 3/10/2023), Disp: 7 5 mL, Rfl: 0  •  clobetasol (TEMOVATE) 0 05 % ointment, Apply topically 2 (two) times a day Apply twice daily for 14 days straight to palms, then stop, Disp: 30 g, Rfl: 0  •  dicyclomine (BENTYL) 20 mg tablet, Take 1 tablet (20 mg total) by mouth 2 (two) times a day as needed (abdominal " pain), Disp: 30 tablet, Rfl: 2  •  escitalopram (LEXAPRO) 10 mg tablet, Take 1 tablet (10 mg total) by mouth daily (Patient taking differently: Take 5 mg by mouth daily), Disp: 90 tablet, Rfl: 2  •  Humira Pen 40 MG/0 4ML PNKT, Inject 1 pen (40 mg) under the skin once every 14 days  (Patient not taking: Reported on 3/31/2023), Disp: 2 each, Rfl: 10  •  hydrocortisone (ANUSOL-HC) 2 5 % rectal cream, Apply topically 2 (two) times a day Apply topically 2 times daily for 7-10 days, Disp: 28 g, Rfl: 0  •  hydrocortisone (ANUSOL-HC) 25 mg suppository, Insert 1 suppository (25 mg total) into the rectum 2 (two) times a day, Disp: 30 suppository, Rfl: 3  •  hydrocortisone 2 5 % ointment, Apply topically 2 (two) times a day Apply twice daily for 14 days to affected areas on face and neck, then stop   (Patient not taking: Reported on 3/31/2023), Disp: 30 g, Rfl: 0  •  hydrOXYzine HCL (ATARAX) 25 mg tablet, Take 1 tablet (25 mg total) by mouth every 6 (six) hours as needed for anxiety, Disp: 30 tablet, Rfl: 0  •  ketoconazole (NIZORAL) 2 % cream, Apply topically daily, Disp: 15 g, Rfl: 1  •  ketoconazole (NIZORAL) 2 % shampoo, Apply 1 application topically 2 (two) times a week (Patient not taking: Reported on 3/31/2023), Disp: 100 mL, Rfl: 0  •  lidocaine (LIDODERM) 5 %, Apply 1 patch topically daily Remove & Discard patch within 12 hours or as directed by MD (Patient not taking: Reported on 3/31/2023), Disp: 10 patch, Rfl: 0  •  Multiple Vitamins-Minerals (MULTIVITAMIN ADULT PO), Daily, Disp: , Rfl:   •  PREBIOTIC PRODUCT PO, Take by mouth, Disp: , Rfl:   •  Probiotic Product (PROBIOTIC-10 PO), Probiotic  1 tab daily, Disp: , Rfl:   •  Tranexamic Acid 650 MG TABS, Take 2 tablets (1,300 mg total) by mouth 3 (three) times a day (Patient not taking: Reported on 3/31/2023), Disp: 5 tablet, Rfl: 4  •  triamcinolone (KENALOG) 0 1 % ointment, Apply topically 2 (two) times a day Apply to hands for 2 weeks at a time with one week "inbetween (after using clobetasol for 2 weeks) (Patient not taking: Reported on 3/31/2023), Disp: 30 g, Rfl: 0          • Whom besides the patient is providing clinical information about today's encounter?   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     Physical Exam:  • (Anatomic Location); (Size and Morphological Description); (Differential Diagnosis):  Palms: group of deep pinpoint pustules on plams  • Pertinent Positives:  • Pertinent Negatives:     Additional History of Present Condition: 39 yr old female with PMH Crohn's ileitis who had been on azathioprine for many years and then transitioned to Humira, but came off of Humira in December  Patient is currently taking Imuran for her Crohn's  She reports a pruritic rash on the hands that started in September that progressed to the neck   Patient has used the clobetasol 0 1% ointment prescribed  Reports improvement while using it, but symptoms recur after stopping treatment  Patient is on budesonide per GI  -Continue budesonide per GI note: \"We will continue 9 mg daily for the next month and taper down to 6 mg for 2 weeks and then 3 mg for 2 weeks before stopping\"  She is also on Imuran 50 mg daily  Biopsy performed 11/23/2023:  Final Diagnosis  A  Skin, right palm, biopsy:     Intraepidermal neutrophilic pustule (see note)      Note: In the appropriate clinical context, 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  Clinical pathologic correlation is advised  Pathogenic microorganisms are not seen with PAS stain  Multiple levels examined         B   Skin, right posterior neck, biopsy:     Focal interface dermatitis and superficial perivascular lymphocytic infiltrate with numerous eosinophils (see note)      Note: In the appropriate clinical context, the histopathologic findings are compatible with " hypersensitivity reaction (e g , to a drug)  Clinical pathologic correlation is advised  Pathogenic microorganisms are not seen with PAS stain  Multiple levels examined      Assessment and Plan:  Based on a thorough discussion of this condition and the management approach to it (including a comprehensive discussion of the known risks, side effects and potential benefits of treatment), the patient (family) agrees to implement the following specific plan:  • Continue triamcinolone 0 1% ointment twice daily for 14 days straight then stop for 1 week  • Given greater immunosuppression with azathioprine, should consider starting an IL-23 inhibitor such as Risankizumab (Skyrizi) due to FDA approval for moderate to severe Crohn's disease  • RTC 6 months        EPIDERMAL INCLUSION CYST    Physical Exam:  • (Anatomic Location); (Size and Morphological Description); (Differential Diagnosis):  o 2 mm papule on left lower vermillion  • Pertinent Positives:  • Pertinent Negatives:     Additional History of Present Condition:  Present since November  (6 months)    Assessment and Plan:  o Likely harmless lesion; counseled that this an occluded pore that may self resolve after spontaneously rupturing

## 2023-04-28 ENCOUNTER — TELEPHONE (OUTPATIENT)
Dept: FAMILY MEDICINE CLINIC | Facility: CLINIC | Age: 43
End: 2023-04-28

## 2023-05-03 ENCOUNTER — OFFICE VISIT (OUTPATIENT)
Dept: FAMILY MEDICINE CLINIC | Facility: CLINIC | Age: 43
End: 2023-05-03

## 2023-05-03 VITALS
DIASTOLIC BLOOD PRESSURE: 88 MMHG | OXYGEN SATURATION: 100 % | HEART RATE: 89 BPM | SYSTOLIC BLOOD PRESSURE: 119 MMHG | TEMPERATURE: 98.1 F

## 2023-05-03 DIAGNOSIS — R05.1 ACUTE COUGH: Primary | ICD-10-CM

## 2023-05-03 DIAGNOSIS — J34.89 RHINORRHEA: ICD-10-CM

## 2023-05-03 PROBLEM — R05.9 COUGH: Status: ACTIVE | Noted: 2023-05-03

## 2023-05-03 RX ORDER — GUAIFENESIN 600 MG/1
600 TABLET, EXTENDED RELEASE ORAL EVERY 12 HOURS SCHEDULED
Qty: 12 TABLET | Refills: 0 | Status: SHIPPED | OUTPATIENT
Start: 2023-05-03 | End: 2023-05-03

## 2023-05-03 RX ORDER — GUAIFENESIN 600 MG/1
600 TABLET, EXTENDED RELEASE ORAL EVERY 12 HOURS SCHEDULED
Qty: 8 TABLET | Refills: 0 | Status: SHIPPED | OUTPATIENT
Start: 2023-05-03 | End: 2023-05-07

## 2023-05-03 NOTE — ASSESSMENT & PLAN NOTE
Patient have cough, fever, congestion and rhinorrhea that started Monday  + Sick contacts (both children were sick last week)  Patient reports that she also had a sore throat that has improved  + Vomiting x1  Patient denies any abdominal pain or diarrhea  Patient noted congestion and rhinorrhea present on exam   Symptoms suggestive of a viral illness  No cervical lymph adenopathy noted       Plan  -[place] nasal spray as needed congestion  -Mucinex twice daily as needed for the next 4 days  -Discussed supportive care with patient  -Discussed ER and return precautions with patient

## 2023-05-03 NOTE — PROGRESS NOTES
Name: Rasta Obregon      : 1980      MRN: [id number]  Encounter Provider: Rosita Gibson MD  Encounter Date: 5/3/2023   Encounter department: [address] [place] [address]  Acute cough  Assessment & Plan:  Patient have cough, fever, congestion and rhinorrhea that started Monday  + Sick contacts (both children were sick last week)  Patient reports that she also had a sore throat that has improved  + Vomiting x1  Patient denies any abdominal pain or diarrhea  Patient noted congestion and rhinorrhea present on exam   Symptoms suggestive of a viral illness  No cervical lymph adenopathy noted  Plan  -[place] nasal spray as needed congestion  -Mucinex twice daily as needed for the next 4 days  -Discussed supportive care with patient  -Discussed ER and return precautions with patient    Orders:  -     Covid/Flu- Lab Collect  -     guaiFENesin (MUCINEX) 600 mg 12 hr tablet; Take 1 tablet (600 mg total) by mouth every 12 (twelve) hours for 4 days    2  Rhinorrhea  Assessment & Plan:  See under acute cough    Orders:  -     sodium chloride (OCEAN) 0 65 % nasal spray; 1 spray into each nostril as needed for congestion         Subjective      Patient with 3 days of cough, congestion, fever and fatigue  Per patient both of his kids were sick last week  Patient denies any nausea, diarrhea, SOB or difficulty breathing  Review of Systems   Constitutional: Positive for fatigue and fever  Negative for chills  HENT: Positive for rhinorrhea  Negative for ear pain and sore throat  Eyes: Negative for pain and visual disturbance  Respiratory: Positive for cough  Negative for shortness of breath  Cardiovascular: Negative for chest pain and palpitations  Gastrointestinal: Positive for nausea and vomiting  Negative for abdominal pain and diarrhea  Genitourinary: Negative for dysuria and hematuria     Musculoskeletal: Negative for arthralgias and back pain  Skin: Negative for color change and rash  Neurological: Negative for seizures and syncope  All other systems reviewed and are negative  Current Outpatient Medications on File Prior to Visit   Medication Sig    azaTHIOprine (IMURAN) 50 mg tablet Take 1 tablet (50 mg total) by mouth daily    budesonide (ENTOCORT EC) 3 MG capsule Take 3 capsules (9 mg total) by mouth daily    ciprofloxacin-dexamethasone (CIPRODEX) otic suspension Administer 4 drops to the right ear 2 (two) times a day (Patient not taking: Reported on 3/10/2023)    clobetasol (TEMOVATE) 0 05 % ointment Apply topically 2 (two) times a day Apply twice daily for 14 days straight to palms, then stop    dicyclomine (BENTYL) 20 mg tablet Take 1 tablet (20 mg total) by mouth 2 (two) times a day as needed (abdominal pain)    escitalopram (LEXAPRO) 10 mg tablet Take 1 tablet (10 mg total) by mouth daily (Patient not taking: Reported on 4/26/2023)    Humira Pen 40 MG/0 4ML PNKT Inject 1 pen (40 mg) under the skin once every 14 days  (Patient not taking: Reported on 3/31/2023)    hydrocortisone (ANUSOL-HC) 2 5 % rectal cream Apply topically 2 (two) times a day Apply topically 2 times daily for 7-10 days    hydrocortisone (ANUSOL-HC) 25 mg suppository Insert 1 suppository (25 mg total) into the rectum 2 (two) times a day    hydrocortisone 2 5 % ointment Apply topically 2 (two) times a day Apply twice daily for 14 days to affected areas on face and neck, then stop   (Patient not taking: Reported on 3/31/2023)    hydrOXYzine HCL (ATARAX) 25 mg tablet Take 1 tablet (25 mg total) by mouth every 6 (six) hours as needed for anxiety    ketoconazole (NIZORAL) 2 % cream Apply topically daily    ketoconazole (NIZORAL) 2 % shampoo Apply 1 application topically 2 (two) times a week (Patient not taking: Reported on 3/31/2023)    lidocaine (LIDODERM) 5 % Apply 1 patch topically daily Remove & Discard patch within 12 hours or as directed by MD (Patient not taking: Reported on 3/31/2023)    Multiple Vitamins-Minerals (MULTIVITAMIN ADULT PO) Daily    PREBIOTIC PRODUCT PO Take by mouth    Probiotic Product (PROBIOTIC-10 PO) Probiotic   1 tab daily    Tranexamic Acid 650 MG TABS Take 2 tablets (1,300 mg total) by mouth 3 (three) times a day (Patient not taking: Reported on 3/31/2023)    triamcinolone (KENALOG) 0 1 % ointment Apply topically 2 (two) times a day Apply to hands for 2 weeks at a time with one week inbetween (after using clobetasol for 2 weeks) (Patient not taking: Reported on 3/31/2023)       Objective     /88   Pulse 89   Temp 98 1 °F (36 7 °C)   SpO2 100%     Physical Exam  Constitutional:       General: She is awake  Appearance: Normal appearance  HENT:      Head: Normocephalic  Jaw: There is normal jaw occlusion  Nose: Congestion and rhinorrhea present  Mouth/Throat:      Mouth: Mucous membranes are moist       Pharynx: No oropharyngeal exudate or posterior oropharyngeal erythema  Eyes:      General: Lids are normal    Cardiovascular:      Rate and Rhythm: Normal rate and regular rhythm  Heart sounds: Normal heart sounds, S1 normal and S2 normal    Pulmonary:      Effort: Pulmonary effort is normal  No tachypnea or bradypnea  Breath sounds: Normal breath sounds and air entry  Abdominal:      General: Bowel sounds are normal       Palpations: Abdomen is soft  Tenderness: There is no abdominal tenderness  Skin:     General: Skin is warm  Neurological:      Mental Status: She is alert and oriented to person, place, and time  Psychiatric:         Attention and Perception: Attention normal          Behavior: Behavior is cooperative         Magdaleno Meléndez MD

## 2023-06-13 ENCOUNTER — HOSPITAL ENCOUNTER (EMERGENCY)
Facility: HOSPITAL | Age: 43
Discharge: HOME | End: 2023-06-13
Attending: EMERGENCY MEDICINE
Payer: OTHER MISCELLANEOUS

## 2023-06-13 VITALS
SYSTOLIC BLOOD PRESSURE: 163 MMHG | RESPIRATION RATE: 18 BRPM | HEART RATE: 68 BPM | TEMPERATURE: 97.7 F | OXYGEN SATURATION: 98 % | DIASTOLIC BLOOD PRESSURE: 79 MMHG

## 2023-06-13 DIAGNOSIS — Z77.21 EXPOSURE TO BLOOD OR BODY FLUID: Primary | ICD-10-CM

## 2023-06-13 PROCEDURE — 99283 EMERGENCY DEPT VISIT LOW MDM: CPT

## 2023-06-13 PROCEDURE — 99283 EMERGENCY DEPT VISIT LOW MDM: CPT | Performed by: EMERGENCY MEDICINE

## 2023-06-13 NOTE — ED PROVIDER NOTES
History  Chief Complaint   Patient presents with   • Body Fluid Exposure     HPI    Prior to Admission Medications   Prescriptions Last Dose Informant Patient Reported? Taking? Humira Pen 40 MG/0 4ML PNKT  Self No No   Sig: Inject 1 pen (40 mg) under the skin once every 14 days     Patient not taking: Reported on 3/31/2023   Multiple Vitamins-Minerals (MULTIVITAMIN ADULT PO)  Self Yes No   Sig: Daily   PREBIOTIC PRODUCT PO  Self Yes No   Sig: Take by mouth   Probiotic Product (PROBIOTIC-10 PO)  Self Yes No   Sig: Probiotic   1 tab daily   Tranexamic Acid 650 MG TABS  Self No No   Sig: Take 2 tablets (1,300 mg total) by mouth 3 (three) times a day   Patient not taking: Reported on 3/31/2023   azaTHIOprine (IMURAN) 50 mg tablet   No No   Sig: Take 1 tablet (50 mg total) by mouth daily   budesonide (ENTOCORT EC) 3 MG capsule  Self No No   Sig: Take 3 capsules (9 mg total) by mouth daily   ciprofloxacin-dexamethasone (CIPRODEX) otic suspension  Self No No   Sig: Administer 4 drops to the right ear 2 (two) times a day   Patient not taking: Reported on 3/10/2023   clobetasol (TEMOVATE) 0 05 % ointment  Self No No   Sig: Apply topically 2 (two) times a day Apply twice daily for 14 days straight to palms, then stop   dicyclomine (BENTYL) 20 mg tablet   No No   Sig: Take 1 tablet (20 mg total) by mouth 2 (two) times a day as needed (abdominal pain)   escitalopram (LEXAPRO) 10 mg tablet  Self No No   Sig: Take 1 tablet (10 mg total) by mouth daily   Patient not taking: Reported on 4/26/2023   hydrOXYzine HCL (ATARAX) 25 mg tablet   No No   Sig: Take 1 tablet (25 mg total) by mouth every 6 (six) hours as needed for anxiety   hydrocortisone (ANUSOL-HC) 2 5 % rectal cream  Self No No   Sig: Apply topically 2 (two) times a day Apply topically 2 times daily for 7-10 days   hydrocortisone (ANUSOL-HC) 25 mg suppository  Self No No   Sig: Insert 1 suppository (25 mg total) into the rectum 2 (two) times a day   hydrocortisone 2 5 % ointment  Self No No   Sig: Apply topically 2 (two) times a day Apply twice daily for 14 days to affected areas on face and neck, then stop     Patient not taking: Reported on 3/31/2023   ketoconazole (NIZORAL) 2 % cream  Self No No   Sig: Apply topically daily   ketoconazole (NIZORAL) 2 % shampoo  Self No No   Sig: Apply 1 application topically 2 (two) times a week   Patient not taking: Reported on 3/31/2023   lidocaine (LIDODERM) 5 %  Self No No   Sig: Apply 1 patch topically daily Remove & Discard patch within 12 hours or as directed by MD   Patient not taking: Reported on 3/31/2023   sodium chloride (OCEAN) 0 65 % nasal spray   No No   Si spray into each nostril as needed for congestion   triamcinolone (KENALOG) 0 1 % ointment  Self No No   Sig: Apply topically 2 (two) times a day Apply to hands for 2 weeks at a time with one week inbetween (after using clobetasol for 2 weeks)   Patient not taking: Reported on 3/31/2023      Facility-Administered Medications: None       Past Medical History:   Diagnosis Date   • Anxiety    • Autoimmune disorder ([facility] [address] )    • COVID 2022    fever   • Crohn disease ([facility] [address] )    • Ear infection        Past Surgical History:   Procedure Laterality Date   •  SECTION     • COLONOSCOPY     • FACIAL SCAR REVISION Right 2022    Procedure: SCAR REVISION OF RIGHT FOREHEAD AND RIGHT NASOLABIAL FOLD;  Surgeon: Tish Lehman MD;  Location: [facility];  Service: Plastics   • KS ADJT/REARGMT F/C/C/M/N/AX/G/H/F 10 1-30 0 SQ CM Right 2022    Procedure: COMPLEX CLOSURE VERSUS ZPLASTY;  Surgeon: Tish Lehman MD;  Location: [facility];  Service: Plastics   • UPPER GASTROINTESTINAL ENDOSCOPY         Family History   Problem Relation Age of Onset   • Obesity Mother    • Hypertension Mother    • Obesity Father    • Hypertension Father    • Colon cancer Neg Hx    • Liver disease Neg Hx    • Breast cancer Neg Hx      I have reviewed and agree with the history as documented  E-Cigarette/Vaping   • E-Cigarette Use Never User      E-Cigarette/Vaping Substances   • Nicotine No    • THC No    • CBD No    • Flavoring No    • Other No    • Unknown No      Social History     Tobacco Use   • Smoking status: Never   • Smokeless tobacco: Never   Vaping Use   • Vaping Use: Never used   Substance Use Topics   • Alcohol use: Yes     Alcohol/week: 2 0 standard drinks of alcohol     Types: 1 Glasses of wine, 1 Shots of liquor per week     Comment: weekends   • Drug use: No       Review of Systems    Physical Exam  Physical Exam    Vital Signs  ED Triage Vitals [06/13/23 0148]   Temperature Pulse Respirations Blood Pressure SpO2   97 7 °F (36 5 °C) 68 18 163/79 98 %      Temp Source Heart Rate Source Patient Position - Orthostatic VS BP Location FiO2 (%)   Tympanic Monitor Sitting Left arm --      Pain Score       --           Vitals:    06/13/23 0148   BP: 163/79   Pulse: 68   Patient Position - Orthostatic VS: Sitting         Visual Acuity      ED Medications  Medications - No data to display    Diagnostic Studies  Results Reviewed     Procedure Component Value Units Date/Time    Hepatitis B surface antigen [[id number]]     Lab Status: No result Specimen: Blood     Hepatitis C antibody [[id number]]     Lab Status: No result Specimen: Blood     HIV 1/2 AG/AB w Reflex [facility] for 2 yr old and above [[id number]]     Lab Status: No result Specimen: Blood     ALT [[id number]]     Lab Status: No result Specimen: Blood     Hepatitis B surface antibody [[id number]]     Lab Status: No result Specimen: Blood                  No orders to display              Procedures  Procedures         ED Course                                             MDM    Disposition  Final diagnoses:   None     ED Disposition     None      Follow-up Information    None         Patient's Medications   Discharge Prescriptions    No medications on file       No discharge procedures on file      PDMP Review     None          ED Provider  Electronically Signed by CONTINUE these medications which have NOT CHANGED    Details   azaTHIOprine (IMURAN) 50 mg tablet Take 1 tablet (50 mg total) by mouth daily, Starting Fri 3/31/2023, Normal      ciprofloxacin-dexamethasone (CIPRODEX) otic suspension Administer 4 drops to the right ear 2 (two) times a day, Starting Tue 8/3/2021, Normal      clobetasol (TEMOVATE) 0.05 % ointment Apply topically 2 (two) times a day Apply twice daily for 14 days straight to palms, then stop, Starting Wed 11/23/2022, Normal      dicyclomine (BENTYL) 20 mg tablet Take 1 tablet (20 mg total) by mouth 2 (two) times a day as needed (abdominal pain), Starting Fri 3/31/2023, Normal      hydrocortisone (ANUSOL-HC) 2.5 % rectal cream Apply topically 2 (two) times a day Apply topically 2 times daily for 7-10 days, Starting Mon 8/31/2020, Normal      hydrocortisone (ANUSOL-HC) 25 mg suppository Insert 1 suppository (25 mg total) into the rectum 2 (two) times a day, Starting Wed 12/15/2021, Normal      hydrocortisone 2.5 % ointment Apply topically 2 (two) times a day Apply twice daily for 14 days to affected areas on face and neck, then stop., Starting Wed 11/23/2022, Normal      hydrOXYzine HCL (ATARAX) 25 mg tablet Take 1 tablet (25 mg total) by mouth every 6 (six) hours as needed for anxiety, Starting Fri 3/10/2023, Normal      ketoconazole (NIZORAL) 2 % cream Apply topically daily, Starting Wed 11/16/2022, Normal      ketoconazole (NIZORAL) 2 % shampoo Apply 1 application topically 2 (two) times a week, Starting Thu 11/17/2022, Normal      lidocaine (LIDODERM) 5 % Apply 1 patch topically daily Remove & Discard patch within 12 hours or as directed by MD, Starting Fri 4/30/2021, Normal      Multiple Vitamins-Minerals (MULTIVITAMIN ADULT PO) Daily, Historical Med      PREBIOTIC PRODUCT PO Take by mouth, Historical Med      Probiotic Product (PROBIOTIC-10 PO) Probiotic   1 tab daily, Historical Med      sodium chloride (OCEAN) 0.65 % nasal spray 1 spray into each nostril as needed for congestion, Starting Wed 5/3/2023, Normal      Tranexamic Acid 650 MG TABS Take 2 tablets (1,300 mg total) by mouth 3 (three) times a day, Starting Tue 1/5/2021, Normal      triamcinolone (KENALOG) 0.1 % ointment Apply topically 2 (two) times a day Apply to hands for 2 weeks at a time with one week inbetween (after using clobetasol for 2 weeks), Starting Wed 11/23/2022, Normal      budesonide (ENTOCORT EC) 3 MG capsule Take 3 capsules (9 mg total) by mouth daily, Starting Fri 3/3/2023, Normal      escitalopram (LEXAPRO) 10 mg tablet Take 1 tablet (10 mg total) by mouth daily, Starting Thu 4/14/2022, Normal      Humira Pen 40 MG/0.4ML PNKT Inject 1 pen (40 mg) under the skin once every 14 days. , Normal             No discharge procedures on file.     PDMP Review     None          ED Provider  Electronically Signed by           Dionte Loaiza MD  07/10/23 0949

## 2023-06-13 NOTE — ED NOTES
Call placed to employee to inform her she needed exposure panel labs drawn  Pt reports she is already home  Pt notified and aware that she needs to come in for blood work  Lab label left with ED charge nurse  Charge nurse and provider notified of same        Kenroy Talavera RN  06/13/23 9817

## 2023-06-15 ENCOUNTER — HOSPITAL ENCOUNTER (EMERGENCY)
Facility: HOSPITAL | Age: 43
Discharge: HOME | End: 2023-06-15
Attending: EMERGENCY MEDICINE
Payer: OTHER MISCELLANEOUS

## 2023-06-15 VITALS
DIASTOLIC BLOOD PRESSURE: 70 MMHG | SYSTOLIC BLOOD PRESSURE: 129 MMHG | OXYGEN SATURATION: 99 % | HEART RATE: 91 BPM | RESPIRATION RATE: 18 BRPM | TEMPERATURE: 98.4 F

## 2023-06-15 DIAGNOSIS — Z57.8 EMPLOYEE EXPOSURE TO BLOOD: Primary | ICD-10-CM

## 2023-06-15 DIAGNOSIS — Z01.89 ENCOUNTER FOR LABORATORY TEST: ICD-10-CM

## 2023-06-15 LAB
ALT SERPL W P-5'-P-CCNC: 24 U/L (ref 7–52)
HBV SURFACE AB SER-ACNC: 5.09 MIU/ML
HBV SURFACE AG SER QL: NORMAL
HCV AB SER QL: NORMAL
HIV 1+2 AB+HIV1 P24 AG SERPL QL IA: NORMAL
HIV 2 AB SERPL QL IA: NORMAL
HIV1 AB SERPL QL IA: NORMAL
HIV1 P24 AG SERPL QL IA: NORMAL

## 2023-06-15 PROCEDURE — 84460 ALANINE AMINO (ALT) (SGPT): CPT | Performed by: EMERGENCY MEDICINE

## 2023-06-15 PROCEDURE — 86704 HEP B CORE ANTIBODY TOTAL: CPT | Performed by: EMERGENCY MEDICINE

## 2023-06-15 PROCEDURE — 86803 HEPATITIS C AB TEST: CPT | Performed by: EMERGENCY MEDICINE

## 2023-06-15 PROCEDURE — 99281 EMR DPT VST MAYX REQ PHY/QHP: CPT

## 2023-06-15 PROCEDURE — 86706 HEP B SURFACE ANTIBODY: CPT | Performed by: EMERGENCY MEDICINE

## 2023-06-15 PROCEDURE — 87340 HEPATITIS B SURFACE AG IA: CPT | Performed by: EMERGENCY MEDICINE

## 2023-06-15 PROCEDURE — 36415 COLL VENOUS BLD VENIPUNCTURE: CPT | Performed by: EMERGENCY MEDICINE

## 2023-06-15 PROCEDURE — 87389 HIV-1 AG W/HIV-1&-2 AB AG IA: CPT | Performed by: EMERGENCY MEDICINE

## 2023-06-15 SDOH — HEALTH STABILITY - PHYSICAL HEALTH: OCCUPATIONAL EXPOSURE TO OTHER RISK FACTORS: Z57.8

## 2023-06-15 NOTE — ED PROVIDER NOTES
History  Chief Complaint   Patient presents with   • Labs Only     Pt states she got a pat's blood in her eye the other day and needs an exposure panel      HPI    Prior to Admission Medications   Prescriptions Last Dose Informant Patient Reported? Taking? Humira Pen 40 MG/0 4ML PNKT  Self No No   Sig: Inject 1 pen (40 mg) under the skin once every 14 days     Patient not taking: Reported on 3/31/2023   Multiple Vitamins-Minerals (MULTIVITAMIN ADULT PO)  Self Yes No   Sig: Daily   PREBIOTIC PRODUCT PO  Self Yes No   Sig: Take by mouth   Probiotic Product (PROBIOTIC-10 PO)  Self Yes No   Sig: Probiotic   1 tab daily   Tranexamic Acid 650 MG TABS  Self No No   Sig: Take 2 tablets (1,300 mg total) by mouth 3 (three) times a day   Patient not taking: Reported on 3/31/2023   azaTHIOprine (IMURAN) 50 mg tablet   No No   Sig: Take 1 tablet (50 mg total) by mouth daily   budesonide (ENTOCORT EC) 3 MG capsule  Self No No   Sig: Take 3 capsules (9 mg total) by mouth daily   ciprofloxacin-dexamethasone (CIPRODEX) otic suspension  Self No No   Sig: Administer 4 drops to the right ear 2 (two) times a day   Patient not taking: Reported on 3/10/2023   clobetasol (TEMOVATE) 0 05 % ointment  Self No No   Sig: Apply topically 2 (two) times a day Apply twice daily for 14 days straight to palms, then stop   dicyclomine (BENTYL) 20 mg tablet   No No   Sig: Take 1 tablet (20 mg total) by mouth 2 (two) times a day as needed (abdominal pain)   escitalopram (LEXAPRO) 10 mg tablet  Self No No   Sig: Take 1 tablet (10 mg total) by mouth daily   Patient not taking: Reported on 4/26/2023   hydrOXYzine HCL (ATARAX) 25 mg tablet   No No   Sig: Take 1 tablet (25 mg total) by mouth every 6 (six) hours as needed for anxiety   hydrocortisone (ANUSOL-HC) 2 5 % rectal cream  Self No No   Sig: Apply topically 2 (two) times a day Apply topically 2 times daily for 7-10 days   hydrocortisone (ANUSOL-HC) 25 mg suppository  Self No No   Sig: Insert 1 suppository (25 mg total) into the rectum 2 (two) times a day   hydrocortisone 2 5 % ointment  Self No No   Sig: Apply topically 2 (two) times a day Apply twice daily for 14 days to affected areas on face and neck, then stop     Patient not taking: Reported on 3/31/2023   ketoconazole (NIZORAL) 2 % cream  Self No No   Sig: Apply topically daily   ketoconazole (NIZORAL) 2 % shampoo  Self No No   Sig: Apply 1 application topically 2 (two) times a week   Patient not taking: Reported on 3/31/2023   lidocaine (LIDODERM) 5 %  Self No No   Sig: Apply 1 patch topically daily Remove & Discard patch within 12 hours or as directed by MD   Patient not taking: Reported on 3/31/2023   sodium chloride (OCEAN) 0 65 % nasal spray   No No   Si spray into each nostril as needed for congestion   triamcinolone (KENALOG) 0 1 % ointment  Self No No   Sig: Apply topically 2 (two) times a day Apply to hands for 2 weeks at a time with one week inbetween (after using clobetasol for 2 weeks)   Patient not taking: Reported on 3/31/2023      Facility-Administered Medications: None       Past Medical History:   Diagnosis Date   • Anxiety    • Autoimmune disorder ([facility] [address] )    • COVID 2022    fever   • Crohn disease ([facility] [address] )    • Ear infection        Past Surgical History:   Procedure Laterality Date   •  SECTION     • COLONOSCOPY     • FACIAL SCAR REVISION Right 2022    Procedure: SCAR REVISION OF RIGHT FOREHEAD AND RIGHT NASOLABIAL FOLD;  Surgeon: Radha Thorne MD;  [facility];  Service: Plastics   • MD ADJT/REARGMT F/C/C/M/N/AX/G/H/F 10 1-30 0 SQ CM Right 2022    Procedure: COMPLEX CLOSURE VERSUS ZPLASTY;  Surgeon: Radha Thorne MD;  [facility];  Service: Plastics   • UPPER GASTROINTESTINAL ENDOSCOPY         Family History   Problem Relation Age of Onset   • Obesity Mother    • Hypertension Mother    • Obesity Father    • Hypertension Father    • Colon cancer Neg Hx    • Liver disease Neg Hx    • Breast cancer Neg Hx      I have reviewed and agree with the history as documented  E-Cigarette/Vaping   • E-Cigarette Use Never User      E-Cigarette/Vaping Substances   • Nicotine No    • THC No    • CBD No    • Flavoring No    • Other No    • Unknown No      Social History     Tobacco Use   • Smoking status: Never   • Smokeless tobacco: Never   Vaping Use   • Vaping Use: Never used   Substance Use Topics   • Alcohol use: Yes     Alcohol/week: 2 0 standard drinks of alcohol     Types: 1 Glasses of wine, 1 Shots of liquor per week     Comment: weekends   • Drug use: No       Review of Systems    Physical Exam  Physical Exam  Vitals and nursing note reviewed  Constitutional:       General: She is not in acute distress  Appearance: She is well-developed  HENT:      Head: Normocephalic and atraumatic  Eyes:      Conjunctiva/sclera: Conjunctivae normal       Pupils: Pupils are equal, round, and reactive to light  Neck:      Trachea: No tracheal deviation  Cardiovascular:      Rate and Rhythm: Normal rate and regular rhythm  Pulmonary:      Effort: Pulmonary effort is normal  No respiratory distress  Musculoskeletal:      Cervical back: Normal range of motion  Skin:     General: Skin is warm and dry  Neurological:      Mental Status: She is alert and oriented to person, place, and time  GCS: GCS eye subscore is 4  GCS verbal subscore is 5  GCS motor subscore is 6     Psychiatric:         Behavior: Behavior normal          Vital Signs  ED Triage Vitals [06/15/23 1009]   Temperature Pulse Respirations Blood Pressure SpO2   98 4 °F (36 9 °C) 91 18 129/70 99 %      Temp Source Heart Rate Source Patient Position - Orthostatic VS BP Location FiO2 (%)   Temporal Monitor Sitting Left arm --      Pain Score       No Pain           Vitals:    06/15/23 1009   BP: 129/70   Pulse: 91   Patient Position - Orthostatic VS: Sitting         Visual Acuity      ED Medications  Medications - No data to display    Diagnostic Studies  Results Reviewed     Procedure Component Value Units Date/Time    Hepatitis C antibody [[id number]] Collected: 06/15/23 1023    Lab Status: In process Specimen: Blood from Arm, Right Updated: 06/15/23 1026    Hepatitis B surface antibody [[id number]] Collected: 06/15/23 1023    Lab Status: In process Specimen: Blood from Arm, Right Updated: 06/15/23 1026    ALT [[id number]] Collected: 06/15/23 1023    Lab Status: In process Specimen: Blood from Arm, Right Updated: 06/15/23 1026    Hepatitis B surface antigen [[id number]] Collected: 06/15/23 1023    Lab Status: In process Specimen: Blood from Arm, Right Updated: 06/15/23 1026    HIV 1/2 AG/AB w Reflex [facility] for 2 yr old and above [[id number]] Collected: 06/15/23 1023    Lab Status: In process Specimen: Blood from Arm, Right Updated: 06/15/23 1026                 No orders to display              Procedures  Procedures         ED Course                                             Medical Decision Making  This is a 51-year-old female who presents here today for evaluation of blood exposure  She was working overnight, and see her in the early morning of 6/13 after splash of blood into her eye  She was seen in the emergency department, however continued working, and never had labs drawn  She is uncertain of if labs are drawn on the source patient  She did follow-up with [facility] today, was told to return to the emergency department to have her lab work drawn  She denies any complaints  Review of systems: otherwise negative unless stated as above    She is well-appearing, no acute distress  Exam is unremarkable  There is no information in her chart from visit the other day stating whether the source patient did have exposure labs drawn  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 from the other day that lists the source patient's information and says that labs were supposedly drawn    I did open the chart of the source patient, MRN: [id number], and confirmed that labs were drawn, and have in fact come back negative  We will draw source labs here  As we are able to confirm that labs were drawn, and are negative, the patient does not need to be started on any prophylactic medications at this time  Employee exposure to blood: acute illness or injury  Encounter for laboratory test: acute illness or injury  Amount and/or Complexity of Data Reviewed  Labs: ordered  Disposition  Final diagnoses:   Employee exposure to blood   Encounter for laboratory test     Time reflects when diagnosis was documented in both MDM as applicable and the Disposition within this note     Time User Action Codes Description Comment    6/15/2023 10:41 AM Sara Canales Add [Z57 8] Employee exposure to blood     6/15/2023 10:41 AM Sara Canales Add [Z01 89] Encounter for laboratory test       ED Disposition     ED Disposition   Discharge    Condition   Good    Date/Time   Thu Leander 15, 2023 10:22 AM    Comment   Thelma Danielle discharge to home/self care  Follow-up Information     Follow up With Specialties Details Why [address] [facility] [place] an appointment as soon as possible for a visit  As needed, to follow up [address] [place] [address]  [phone]          Patient's Medications   Discharge Prescriptions    No medications on file       No discharge procedures on file      PDMP Review     None          ED Provider  Electronically Signed by           Francisco Silverman MD  06/15/23 3619

## 2023-06-16 LAB — HBV CORE AB SER QL: NORMAL

## 2023-06-19 DIAGNOSIS — F41.9 ANXIETY: ICD-10-CM

## 2023-06-20 RX ORDER — ESCITALOPRAM OXALATE 10 MG/1
10 TABLET ORAL DAILY
Qty: 90 TABLET | Refills: 2 | Status: SHIPPED | OUTPATIENT
Start: 2023-06-20

## 2023-06-24 ENCOUNTER — APPOINTMENT (OUTPATIENT)
Dept: LAB | Facility: HOSPITAL | Age: 43
End: 2023-06-24
Payer: COMMERCIAL

## 2023-06-24 DIAGNOSIS — K50.00 CROHN'S DISEASE INVOLVING TERMINAL ILEUM (HCC): ICD-10-CM

## 2023-06-24 LAB
ALBUMIN SERPL BCP-MCNC: 4.5 G/DL (ref 3.5–5)
ALP SERPL-CCNC: 65 U/L (ref 34–104)
ALT SERPL W P-5'-P-CCNC: 23 U/L (ref 7–52)
ANION GAP SERPL CALCULATED.3IONS-SCNC: 7 MMOL/L
AST SERPL W P-5'-P-CCNC: 25 U/L (ref 13–39)
BASOPHILS # BLD AUTO: 0.02 THOUSANDS/ÂΜL (ref 0–0.1)
BASOPHILS NFR BLD AUTO: 0 % (ref 0–1)
BILIRUB SERPL-MCNC: 0.83 MG/DL (ref 0.2–1)
BUN SERPL-MCNC: 10 MG/DL (ref 5–25)
CALCIUM SERPL-MCNC: 9.6 MG/DL (ref 8.4–10.2)
CHLORIDE SERPL-SCNC: 104 MMOL/L (ref 96–108)
CO2 SERPL-SCNC: 26 MMOL/L (ref 21–32)
CREAT SERPL-MCNC: 0.62 MG/DL (ref 0.6–1.3)
CRP SERPL QL: <1 MG/L
EOSINOPHIL # BLD AUTO: 0.1 THOUSAND/ÂΜL (ref 0–0.61)
EOSINOPHIL NFR BLD AUTO: 2 % (ref 0–6)
ERYTHROCYTE [DISTWIDTH] IN BLOOD BY AUTOMATED COUNT: 12.4 % (ref 11.6–15.1)
GFR SERPL CREATININE-BSD FRML MDRD: 110 ML/MIN/1.73SQ M
GLUCOSE P FAST SERPL-MCNC: 82 MG/DL (ref 65–99)
HCT VFR BLD AUTO: 35.3 % (ref 34.8–46.1)
HGB BLD-MCNC: 11.7 G/DL (ref 11.5–15.4)
IMM GRANULOCYTES # BLD AUTO: 0.01 THOUSAND/UL (ref 0–0.2)
IMM GRANULOCYTES NFR BLD AUTO: 0 % (ref 0–2)
LYMPHOCYTES # BLD AUTO: 1.27 THOUSANDS/ÂΜL (ref 0.6–4.47)
LYMPHOCYTES NFR BLD AUTO: 25 % (ref 14–44)
MCH RBC QN AUTO: 29.8 PG (ref 26.8–34.3)
MCHC RBC AUTO-ENTMCNC: 33.1 G/DL (ref 31.4–37.4)
MCV RBC AUTO: 90 FL (ref 82–98)
MONOCYTES # BLD AUTO: 0.32 THOUSAND/ÂΜL (ref 0.17–1.22)
MONOCYTES NFR BLD AUTO: 6 % (ref 4–12)
NEUTROPHILS # BLD AUTO: 3.4 THOUSANDS/ÂΜL (ref 1.85–7.62)
NEUTS SEG NFR BLD AUTO: 67 % (ref 43–75)
NRBC BLD AUTO-RTO: 0 /100 WBCS
PLATELET # BLD AUTO: 274 THOUSANDS/UL (ref 149–390)
PMV BLD AUTO: 11.4 FL (ref 8.9–12.7)
POTASSIUM SERPL-SCNC: 3.5 MMOL/L (ref 3.5–5.3)
PROT SERPL-MCNC: 7.9 G/DL (ref 6.4–8.4)
RBC # BLD AUTO: 3.92 MILLION/UL (ref 3.81–5.12)
SODIUM SERPL-SCNC: 137 MMOL/L (ref 135–147)
WBC # BLD AUTO: 5.12 THOUSAND/UL (ref 4.31–10.16)

## 2023-06-24 PROCEDURE — 82306 VITAMIN D 25 HYDROXY: CPT

## 2023-06-24 PROCEDURE — 86140 C-REACTIVE PROTEIN: CPT

## 2023-06-24 PROCEDURE — 85025 COMPLETE CBC W/AUTO DIFF WBC: CPT

## 2023-06-24 PROCEDURE — 80053 COMPREHEN METABOLIC PANEL: CPT

## 2023-06-24 PROCEDURE — 82607 VITAMIN B-12: CPT

## 2023-06-24 PROCEDURE — 36415 COLL VENOUS BLD VENIPUNCTURE: CPT

## 2023-06-25 LAB
25(OH)D3 SERPL-MCNC: 24 NG/ML (ref 30–100)
VIT B12 SERPL-MCNC: 280 PG/ML (ref 180–914)

## 2023-06-27 ENCOUNTER — OFFICE VISIT (OUTPATIENT)
Dept: GASTROENTEROLOGY | Facility: CLINIC | Age: 43
End: 2023-06-27
Payer: COMMERCIAL

## 2023-06-27 VITALS
HEART RATE: 75 BPM | WEIGHT: 118 LBS | HEIGHT: 60 IN | DIASTOLIC BLOOD PRESSURE: 84 MMHG | SYSTOLIC BLOOD PRESSURE: 119 MMHG | OXYGEN SATURATION: 97 % | BODY MASS INDEX: 23.16 KG/M2

## 2023-06-27 DIAGNOSIS — M25.572 ARTHRALGIA OF BOTH FEET: ICD-10-CM

## 2023-06-27 DIAGNOSIS — M25.571 ARTHRALGIA OF BOTH FEET: ICD-10-CM

## 2023-06-27 DIAGNOSIS — K50.00 CROHN'S DISEASE INVOLVING TERMINAL ILEUM (HCC): Primary | ICD-10-CM

## 2023-06-27 DIAGNOSIS — R10.13 EPIGASTRIC PAIN: ICD-10-CM

## 2023-06-27 PROBLEM — M25.50 ARTHRALGIA: Status: ACTIVE | Noted: 2023-06-27

## 2023-06-27 PROCEDURE — 99214 OFFICE O/P EST MOD 30 MIN: CPT | Performed by: INTERNAL MEDICINE

## 2023-06-27 RX ORDER — MULTIVIT WITH MINERALS/LUTEIN
1000 TABLET ORAL DAILY
COMMUNITY

## 2023-06-27 RX ORDER — MELATONIN
1000 DAILY
COMMUNITY

## 2023-06-27 NOTE — PROGRESS NOTES
[address] [facility]  Feliz Blum 37 y o  female MRN: [id number]            Assessment & Plan:  Pleasant 51-year-old female, longstanding history of Crohn's ileitis, clinically doing very well on Imuran  Developed significant pustular rash secondary to anti-TNF's which had to be discontinued when trying to transition off of azathioprine  1   Ileal Crohn's disease:--Continue azathioprine 50 mg daily, clinically does very well on this  -We will check laboratory studies  We will have her follow-up in 3 months time at that time schedule colonoscopy for reevaluation  -She reports mild arthralgias, she was instructed to monitor this, if they worsen may be due to her underlying inflammatory bowel disease  -She may be a good candidate for IL 23 inhibitors or [place] Sandra was seen today for schedule colonoscopy and medication management  Diagnoses and all orders for this visit:    Crohn's disease involving terminal ileum ([facility] [address] )  -     Basic metabolic panel; Future  -     CBC and differential; Future  -     Hepatic function panel; Future  -     Lipase; Future    Epigastric pain    Arthralgia of both feet            _____________________________________________________________        CC: Follow-up    HPI:  Feliz Blum is a 37 y  o female who is here for follow-up  51-year-old female with a longstanding history of Crohn's ileitis, she had been clinically and doing well on Imuran for many years, due to longstanding use of Imuran we tried to transition her to anti-TNF agent, specifically adalimumab, she developed palmopustular rash associated with TNF confirmed by biopsy with dermatology  Patient is back on Imuran, doing well  Has regular bowel movements  Formed stools  Denies any abdominal pain  Reflux is well controlled, no longer requiring medications  No longer taking dicyclomine  She reports mild arthralgias in her ankles      ROS:  The remainder of the ROS was negative except for the pertinent positives mentioned in HPI        Allergies: Epinephrine    Medications:   Current Outpatient Medications:   •  Ascorbic Acid (vitamin C) 1000 MG tablet, Take 1,000 mg by mouth daily, Disp: , Rfl:   •  azaTHIOprine (IMURAN) 50 mg tablet, Take 1 tablet (50 mg total) by mouth daily, Disp: 90 tablet, Rfl: 3  •  cholecalciferol (VITAMIN D3) 1,000 units tablet, Take 1,000 Units by mouth daily, Disp: , Rfl:   •  clobetasol (TEMOVATE) 0 05 % ointment, Apply topically 2 (two) times a day Apply twice daily for 14 days straight to palms, then stop, Disp: 30 g, Rfl: 0  •  dicyclomine (BENTYL) 20 mg tablet, Take 1 tablet (20 mg total) by mouth 2 (two) times a day as needed (abdominal pain), Disp: 30 tablet, Rfl: 2  •  escitalopram (LEXAPRO) 10 mg tablet, Take 1 tablet (10 mg total) by mouth daily, Disp: 90 tablet, Rfl: 2  •  hydrocortisone (ANUSOL-HC) 2 5 % rectal cream, Apply topically 2 (two) times a day Apply topically 2 times daily for 7-10 days, Disp: 28 g, Rfl: 0  •  hydrocortisone (ANUSOL-HC) 25 mg suppository, Insert 1 suppository (25 mg total) into the rectum 2 (two) times a day, Disp: 30 suppository, Rfl: 3  •  hydrOXYzine HCL (ATARAX) 25 mg tablet, Take 1 tablet (25 mg total) by mouth every 6 (six) hours as needed for anxiety, Disp: 30 tablet, Rfl: 0  •  ketoconazole (NIZORAL) 2 % cream, Apply topically daily, Disp: 15 g, Rfl: 1  •  Multiple Vitamins-Minerals (MULTIVITAMIN ADULT PO), Daily, Disp: , Rfl:   •  PREBIOTIC PRODUCT PO, Take by mouth, Disp: , Rfl:   •  Probiotic Product (PROBIOTIC-10 PO), Probiotic  1 tab daily, Disp: , Rfl:   •  sodium chloride (OCEAN) 0 65 % nasal spray, 1 spray into each nostril as needed for congestion, Disp: 30 mL, Rfl: 0  •  ciprofloxacin-dexamethasone (CIPRODEX) otic suspension, Administer 4 drops to the right ear 2 (two) times a day (Patient not taking: Reported on 3/10/2023), Disp: 7 5 mL, Rfl: 0  •  hydrocortisone 2 5 % ointment, Apply topically 2 (two) times a day Apply twice daily for 14 days to affected areas on face and neck, then stop  (Patient not taking: Reported on 3/31/2023), Disp: 30 g, Rfl: 0  •  ketoconazole (NIZORAL) 2 % shampoo, Apply 1 application topically 2 (two) times a week (Patient not taking: Reported on 3/31/2023), Disp: 100 mL, Rfl: 0  •  lidocaine (LIDODERM) 5 %, Apply 1 patch topically daily Remove & Discard patch within 12 hours or as directed by MD (Patient not taking: Reported on 3/31/2023), Disp: 10 patch, Rfl: 0  •  Tranexamic Acid 650 MG TABS, Take 2 tablets (1,300 mg total) by mouth 3 (three) times a day (Patient not taking: Reported on 3/31/2023), Disp: 5 tablet, Rfl: 4  •  triamcinolone (KENALOG) 0 1 % ointment, Apply topically 2 (two) times a day Apply to hands for 2 weeks at a time with one week inbetween (after using clobetasol for 2 weeks) (Patient not taking: Reported on 3/31/2023), Disp: 30 g, Rfl: 0    Past Medical History:   Diagnosis Date   • Anxiety    • Autoimmune disorder ([facility] [address] )    • COVID 2022    fever   • Crohn disease ([facility] [address] )    • Ear infection        Past Surgical History:   Procedure Laterality Date   •  SECTION     • COLONOSCOPY     • FACIAL SCAR REVISION Right 2022    Procedure: SCAR REVISION OF RIGHT FOREHEAD AND RIGHT NASOLABIAL FOLD;  Surgeon: Salazar Diallo MD;  [facility];  Service: Plastics   • FL ADJT/REARGMT F/C/C/M/N/AX/G/H/F 10 1-30 0 SQ CM Right 2022    Procedure: COMPLEX CLOSURE VERSUS ZPLASTY;  Surgeon: Salazar Diallo MD;  [facility];  Service: Plastics   • UPPER GASTROINTESTINAL ENDOSCOPY         Family History   Problem Relation Age of Onset   • Obesity Mother    • Hypertension Mother    • Obesity Father    • Hypertension Father    • Colon cancer Neg Hx    • Liver disease Neg Hx    • Breast cancer Neg Hx         reports that she has never smoked   She has never used smokeless tobacco  She reports current alcohol use of about 2 0 standard drinks of alcohol per week  She reports that she does not use drugs        Physical Exam:    /84 (BP Location: Right arm, Patient Position: Sitting, Cuff Size: Standard)   Pulse 75   Ht 5' (1 524 m)   Wt 53 5 kg (118 lb)   SpO2 97%   BMI 23 05 kg/m²     Gen: wn/wd, NAD, healthy-appearing female  HEENT: anicteric, MMM, no cervical LAD  CVS: RRR, no m/r/g  CHEST: CTA b/l  ABD: +BS, soft, NT,ND, no hepatosplenomegaly  EXT: no c/c/e  NEURO: aaox3  SKIN: NO rashes

## 2023-06-27 NOTE — LETTER
June 27, 2023     Shaina Duarte DO  [facility] [address] [facility]    Patient: Jose Batres   YOB: 1980   Date of Visit: 6/27/2023       Dear Dr Genesis Cramer: Thank you for referring Jose Batres to me for evaluation  Below are my notes for this consultation  If you have questions, please do not hesitate to call me  I look forward to following your patient along with you  Sincerely,        Jamie Awad MD        CC: No Recipients    Jamie Awad MD  6/27/2023  4:18 PM  Sign when Signing Visit  [address] [facility]  Jose Batres 37 y o  female MRN: [id number]            Assessment & Plan:  Pleasant 77-year-old female, longstanding history of Crohn's ileitis, clinically doing very well on Imuran  Developed significant pustular rash secondary to anti-TNF's which had to be discontinued when trying to transition off of azathioprine  1   Ileal Crohn's disease:--Continue azathioprine 50 mg daily, clinically does very well on this  -We will check laboratory studies  We will have her follow-up in 3 months time at that time schedule colonoscopy for reevaluation  -She reports mild arthralgias, she was instructed to monitor this, if they worsen may be due to her underlying inflammatory bowel disease  -She may be a good candidate for IL 23 inhibitors or [place] was seen today for schedule colonoscopy and medication management  Diagnoses and all orders for this visit:    Crohn's disease involving terminal ileum ([address] )  -     Basic metabolic panel; Future  -     CBC and differential; Future  -     Hepatic function panel; Future  -     Lipase; Future    Epigastric pain    Arthralgia of both feet            _____________________________________________________________        CC: Follow-up    HPI:  Jose Batres is a 37 y  o female who is here for follow-up    77-year-old female with a longstanding history of Crohn's ileitis, she had been clinically and doing well on Imuran for many years, due to longstanding use of Imuran we tried to transition her to anti-TNF agent, specifically adalimumab, she developed palmopustular rash associated with TNF confirmed by biopsy with dermatology  Patient is back on Imuran, doing well  Has regular bowel movements  Formed stools  Denies any abdominal pain  Reflux is well controlled, no longer requiring medications  No longer taking dicyclomine  She reports mild arthralgias in her ankles  ROS:  The remainder of the ROS was negative except for the pertinent positives mentioned in HPI        Allergies: Epinephrine    Medications:   Current Outpatient Medications:   •  Ascorbic Acid (vitamin C) 1000 MG tablet, Take 1,000 mg by mouth daily, Disp: , Rfl:   •  azaTHIOprine (IMURAN) 50 mg tablet, Take 1 tablet (50 mg total) by mouth daily, Disp: 90 tablet, Rfl: 3  •  cholecalciferol (VITAMIN D3) 1,000 units tablet, Take 1,000 Units by mouth daily, Disp: , Rfl:   •  clobetasol (TEMOVATE) 0 05 % ointment, Apply topically 2 (two) times a day Apply twice daily for 14 days straight to palms, then stop, Disp: 30 g, Rfl: 0  •  dicyclomine (BENTYL) 20 mg tablet, Take 1 tablet (20 mg total) by mouth 2 (two) times a day as needed (abdominal pain), Disp: 30 tablet, Rfl: 2  •  escitalopram (LEXAPRO) 10 mg tablet, Take 1 tablet (10 mg total) by mouth daily, Disp: 90 tablet, Rfl: 2  •  hydrocortisone (ANUSOL-HC) 2 5 % rectal cream, Apply topically 2 (two) times a day Apply topically 2 times daily for 7-10 days, Disp: 28 g, Rfl: 0  •  hydrocortisone (ANUSOL-HC) 25 mg suppository, Insert 1 suppository (25 mg total) into the rectum 2 (two) times a day, Disp: 30 suppository, Rfl: 3  •  hydrOXYzine HCL (ATARAX) 25 mg tablet, Take 1 tablet (25 mg total) by mouth every 6 (six) hours as needed for anxiety, Disp: 30 tablet, Rfl: 0  •  ketoconazole (NIZORAL) 2 % cream, Apply topically daily, Disp: 15 g, Rfl: 1  •  Multiple Vitamins-Minerals (MULTIVITAMIN ADULT PO), Daily, Disp: , Rfl:   •  PREBIOTIC PRODUCT PO, Take by mouth, Disp: , Rfl:   •  Probiotic Product (PROBIOTIC-10 PO), Probiotic  1 tab daily, Disp: , Rfl:   •  sodium chloride (OCEAN) 0 65 % nasal spray, 1 spray into each nostril as needed for congestion, Disp: 30 mL, Rfl: 0  •  ciprofloxacin-dexamethasone (CIPRODEX) otic suspension, Administer 4 drops to the right ear 2 (two) times a day (Patient not taking: Reported on 3/10/2023), Disp: 7 5 mL, Rfl: 0  •  hydrocortisone 2 5 % ointment, Apply topically 2 (two) times a day Apply twice daily for 14 days to affected areas on face and neck, then stop   (Patient not taking: Reported on 3/31/2023), Disp: 30 g, Rfl: 0  •  ketoconazole (NIZORAL) 2 % shampoo, Apply 1 application topically 2 (two) times a week (Patient not taking: Reported on 3/31/2023), Disp: 100 mL, Rfl: 0  •  lidocaine (LIDODERM) 5 %, Apply 1 patch topically daily Remove & Discard patch within 12 hours or as directed by MD (Patient not taking: Reported on 3/31/2023), Disp: 10 patch, Rfl: 0  •  Tranexamic Acid 650 MG TABS, Take 2 tablets (1,300 mg total) by mouth 3 (three) times a day (Patient not taking: Reported on 3/31/2023), Disp: 5 tablet, Rfl: 4  •  triamcinolone (KENALOG) 0 1 % ointment, Apply topically 2 (two) times a day Apply to hands for 2 weeks at a time with one week inbetween (after using clobetasol for 2 weeks) (Patient not taking: Reported on 3/31/2023), Disp: 30 g, Rfl: 0    Past Medical History:   Diagnosis Date   • Anxiety    • Autoimmune disorder ([address] )    • COVID 2022    fever   • Crohn disease ([address] )    • Ear infection        Past Surgical History:   Procedure Laterality Date   •  SECTION     • COLONOSCOPY     • FACIAL SCAR REVISION Right 2022    Procedure: SCAR REVISION OF RIGHT FOREHEAD AND RIGHT NASOLABIAL FOLD;  Surgeon: Wallene Schaumann, MD;  [facility];  Service: Plastics   • MD ADJT/REARGMT F/C/C/M/N/AX/G/H/F 10 1-30 0 SQ CM Right 8/9/2022    Procedure: COMPLEX CLOSURE VERSUS ZPLASTY;  Surgeon: Alcus Cushing, MD;  [facility];  Service: Plastics   • UPPER GASTROINTESTINAL ENDOSCOPY         Family History   Problem Relation Age of Onset   • Obesity Mother    • Hypertension Mother    • Obesity Father    • Hypertension Father    • Colon cancer Neg Hx    • Liver disease Neg Hx    • Breast cancer Neg Hx         reports that she has never smoked  She has never used smokeless tobacco  She reports current alcohol use of about 2 0 standard drinks of alcohol per week  She reports that she does not use drugs        Physical Exam:    /84 (BP Location: Right arm, Patient Position: Sitting, Cuff Size: Standard)   Pulse 75   Ht 5' (1 524 m)   Wt 53 5 kg (118 lb)   SpO2 97%   BMI 23 05 kg/m²     Gen: wn/wd, NAD, healthy-appearing female  HEENT: anicteric, MMM, no cervical LAD  CVS: RRR, no m/r/g  CHEST: CTA b/l  ABD: +BS, soft, NT,ND, no hepatosplenomegaly  EXT: no c/c/e  NEURO: aaox3  SKIN: NO rashes

## 2023-07-02 PROBLEM — R05.9 COUGH: Status: RESOLVED | Noted: 2023-05-03 | Resolved: 2023-07-02

## 2023-07-31 ENCOUNTER — APPOINTMENT (OUTPATIENT)
Dept: LAB | Facility: CLINIC | Age: 43
End: 2023-07-31

## 2023-07-31 DIAGNOSIS — Z00.8 HEALTH EXAMINATION IN POPULATION SURVEY: ICD-10-CM

## 2023-07-31 LAB
CHOLEST SERPL-MCNC: 150 MG/DL
EST. AVERAGE GLUCOSE BLD GHB EST-MCNC: 105 MG/DL
HBA1C MFR BLD: 5.3 %
HDLC SERPL-MCNC: 58 MG/DL
LDLC SERPL CALC-MCNC: 74 MG/DL (ref 0–100)
NONHDLC SERPL-MCNC: 92 MG/DL
TRIGL SERPL-MCNC: 90 MG/DL

## 2023-07-31 PROCEDURE — 80061 LIPID PANEL: CPT

## 2023-07-31 PROCEDURE — 36415 COLL VENOUS BLD VENIPUNCTURE: CPT

## 2023-07-31 PROCEDURE — 83036 HEMOGLOBIN GLYCOSYLATED A1C: CPT

## 2023-09-25 ENCOUNTER — OFFICE VISIT (OUTPATIENT)
Dept: GYNECOLOGY | Facility: CLINIC | Age: 43
End: 2023-09-25
Payer: COMMERCIAL

## 2023-09-25 DIAGNOSIS — Z01.419 ROUTINE GYNECOLOGICAL EXAMINATION: ICD-10-CM

## 2023-09-25 DIAGNOSIS — Z11.51 SCREENING FOR HPV (HUMAN PAPILLOMAVIRUS): ICD-10-CM

## 2023-09-25 DIAGNOSIS — Z12.31 SCREENING MAMMOGRAM FOR BREAST CANCER: Primary | ICD-10-CM

## 2023-09-25 PROCEDURE — S0610 ANNUAL GYNECOLOGICAL EXAMINA: HCPCS | Performed by: OBSTETRICS & GYNECOLOGY

## 2023-09-25 PROCEDURE — G0145 SCR C/V CYTO,THINLAYER,RESCR: HCPCS | Performed by: OBSTETRICS & GYNECOLOGY

## 2023-09-25 PROCEDURE — G0476 HPV COMBO ASSAY CA SCREEN: HCPCS | Performed by: OBSTETRICS & GYNECOLOGY

## 2023-09-25 NOTE — PROGRESS NOTES
Assessment/Plan:    Normal breast and GYN exam  Regular menses   Normal Pap smear negative HPV 2018  Crohn's disease on Imuran  Esure sterilization procedure   Normal pelvic and abdominal CT scan   Normal colonoscopy . Due for repeat this year. Normal diagnostic mammogram 2022    Plan: Check Pap smear. .  Continue healthy diet and exercise. Recommend daily Kegels. Information on herbal supplements for menopause symptoms given as per patient request.    Subjective: G4, P2      Patient ID: Annette Matute is a 37 y.o. female presents to the office for annual exam with no complaints. Menstrual cycles are regular and has heavy bleeding day 2 only. Denies any pelvic pain or dyspareunia. Denies any breast or bladder problems. Crohn's colitis is controlled on Imuran. Patient has had no side effects on treatment. Exercising regularly and eating mostly a vegetarian diet. Working full-time as a CT scan tech. No family history of cancers. Review of Systems   Constitutional: Negative. Negative for fatigue, fever and unexpected weight change. HENT: Negative. Eyes: Negative. Respiratory: Negative. Negative for chest tightness, shortness of breath, wheezing and stridor. Cardiovascular: Negative. Negative for chest pain, palpitations and leg swelling. Gastrointestinal: Negative. Negative for abdominal pain, blood in stool, diarrhea, nausea, rectal pain and vomiting. Endocrine: Negative. Genitourinary: Negative for dysuria, frequency, vaginal bleeding, vaginal discharge and vaginal pain. Musculoskeletal: Negative. Skin: Negative. Allergic/Immunologic: Negative. Neurological: Negative. Hematological: Negative. Psychiatric/Behavioral: Negative. All other systems reviewed and are negative. Objective: There were no vitals taken for this visit. Physical Exam  Constitutional:       Appearance: She is well-developed.    HENT: Head: Normocephalic and atraumatic. Neck:      Thyroid: No thyromegaly. Trachea: No tracheal deviation. Cardiovascular:      Rate and Rhythm: Normal rate and regular rhythm. Heart sounds: Normal heart sounds. Pulmonary:      Effort: Pulmonary effort is normal. No respiratory distress. Breath sounds: Normal breath sounds. No stridor. No wheezing or rales. Chest:      Chest wall: No tenderness. Breasts:     Breasts are symmetrical.      Right: No inverted nipple, mass, nipple discharge, skin change or tenderness. Left: No inverted nipple, mass, nipple discharge, skin change or tenderness. Abdominal:      General: Bowel sounds are normal. There is no distension. Palpations: Abdomen is soft. There is no mass. Tenderness: There is no abdominal tenderness. There is no guarding or rebound. Hernia: No hernia is present. There is no hernia in the left inguinal area. Genitourinary:     Labia:         Right: No rash, tenderness, lesion or injury. Left: No rash, tenderness, lesion or injury. Vagina: Normal. No signs of injury and foreign body. No vaginal discharge, erythema, tenderness or bleeding. Cervix: No cervical motion tenderness, discharge or friability. Uterus: Not deviated, not enlarged, not fixed and not tender. Adnexa:         Right: No mass, tenderness or fullness. Left: No mass, tenderness or fullness. Rectum: No mass, anal fissure, external hemorrhoid or internal hemorrhoid. Musculoskeletal:      Cervical back: Normal range of motion and neck supple. Lymphadenopathy:      Lower Body: No right inguinal adenopathy. No left inguinal adenopathy. Skin:     General: Skin is warm and dry. Neurological:      Mental Status: She is alert and oriented to person, place, and time. Psychiatric:         Behavior: Behavior normal.         Thought Content:  Thought content normal.         Judgment: Judgment normal.

## 2023-09-27 LAB
HPV HR 12 DNA CVX QL NAA+PROBE: NEGATIVE
HPV16 DNA CVX QL NAA+PROBE: NEGATIVE
HPV18 DNA CVX QL NAA+PROBE: NEGATIVE

## 2023-10-05 LAB
LAB AP GYN PRIMARY INTERPRETATION: NORMAL
Lab: NORMAL

## 2023-11-13 ENCOUNTER — OFFICE VISIT (OUTPATIENT)
Dept: GASTROENTEROLOGY | Facility: CLINIC | Age: 43
End: 2023-11-13
Payer: COMMERCIAL

## 2023-11-13 VITALS
HEIGHT: 60 IN | DIASTOLIC BLOOD PRESSURE: 86 MMHG | OXYGEN SATURATION: 98 % | WEIGHT: 123 LBS | HEART RATE: 84 BPM | SYSTOLIC BLOOD PRESSURE: 113 MMHG | BODY MASS INDEX: 24.15 KG/M2

## 2023-11-13 DIAGNOSIS — R09.A2 GLOBUS SENSATION: ICD-10-CM

## 2023-11-13 DIAGNOSIS — K50.00 CROHN'S DISEASE INVOLVING TERMINAL ILEUM (HCC): Primary | ICD-10-CM

## 2023-11-13 PROCEDURE — 99214 OFFICE O/P EST MOD 30 MIN: CPT | Performed by: INTERNAL MEDICINE

## 2023-11-13 RX ORDER — PANTOPRAZOLE SODIUM 40 MG/1
40 TABLET, DELAYED RELEASE ORAL DAILY
Qty: 30 TABLET | Refills: 3 | Status: SHIPPED | OUTPATIENT
Start: 2023-11-13

## 2023-11-13 NOTE — PROGRESS NOTES
[address]  Sven Ochoa 37 y.o. female MRN: [id number]            Assessment & Plan:  Pleasant 60-year-old female with history of ileal Crohn's disease, recent bouts of atypical chest pain and cold sensation. Atypical chest pain and global sensation: Most likely secondary reflux  -She started taking omeprazole with relief of symptoms  -We will prescribe pantoprazole 40 mg daily  Full schedule for an upper endoscopy for further evaluation  -Patient has follow-up with PCP next week, consider cardiopulmonary evaluation at that time    2. Ileal Crohn's disease: Clinically doing much better on azathioprine  -Had adverse reactions to Humira  -We will proceed with repeat colonoscopy, last examination was 3 years ago to evaluate for mucosal healing  -Patient aware of risks of procedures including bleeding, surgery, perforation next-Dulcolax and GoLytely anny      Jocy was seen today for schedule colonoscopy and gerd. Diagnoses and all orders for this visit:    Crohn's disease involving terminal ileum ([address])  -     Basic metabolic panel; Future  -     CBC and differential; Future  -     Hepatic function panel; Future  -     Lipase; Future  -     Colonoscopy; Future  -     polyethylene glycol (GOLYTELY) 4000 mL solution; Take 4,000 mL by mouth once for 1 dose    Globus sensation  -     EGD; Future  -     pantoprazole (PROTONIX) 40 mg tablet; Take 1 tablet (40 mg total) by mouth daily    Other orders  -     Diet NPO; Sips with meds; Standing  -     Void on call to OR; Standing            _____________________________________________________________        CC: Follow-up    HPI:  Sven Ochoa is a 37 y. o.female who is here for follow-up. 60-year-old who with a history of ileal Crohn's disease, maintained on azathioprine. She had adverse reaction to Humira and was discontinued, she is done very well on azathioprine for many years.   Discussed with her and she is aware of potential long-term side effects. Overall she been doing well, has had intermittent bouts of left upper quadrant pain with symptoms has been associated with Crohn's in the past.  She took dicyclomine 1 time and that seemed to resolve the pain. She had recurrence of globus sensation. Recently had some atypical chest pain which lasted almost a full day. Denies any nausea, vomiting, dysphagia. Weight has been stable. Does report having some dizziness, has been increasing her hours at work and the dizziness seems to get better when she sleeps and she is not as tired. ROS:  The remainder of the ROS was negative except for the pertinent positives mentioned in HPI.       Allergies: Epinephrine    Medications:   Current Outpatient Medications:     Ascorbic Acid (vitamin C) 1000 MG tablet, Take 1,000 mg by mouth daily, Disp: , Rfl:     azaTHIOprine (IMURAN) 50 mg tablet, Take 1 tablet (50 mg total) by mouth daily, Disp: 90 tablet, Rfl: 3    cholecalciferol (VITAMIN D3) 1,000 units tablet, Take 1,000 Units by mouth daily, Disp: , Rfl:     ciprofloxacin-dexamethasone (CIPRODEX) otic suspension, Administer 4 drops into both ears 2 (two) times a day (Patient taking differently: Administer 4 drops into both ears 2 (two) times a day As  needed), Disp: 7.5 mL, Rfl: 2    dicyclomine (BENTYL) 20 mg tablet, Take 1 tablet (20 mg total) by mouth 2 (two) times a day as needed (abdominal pain), Disp: 30 tablet, Rfl: 2    escitalopram (LEXAPRO) 10 mg tablet, Take 1 tablet (10 mg total) by mouth daily, Disp: 90 tablet, Rfl: 2    hydrocortisone (ANUSOL-HC) 2.5 % rectal cream, Apply topically 2 (two) times a day Apply topically 2 times daily for 7-10 days, Disp: 28 g, Rfl: 0    hydrocortisone (ANUSOL-HC) 25 mg suppository, Insert 1 suppository (25 mg total) into the rectum 2 (two) times a day, Disp: 30 suppository, Rfl: 3    hydrocortisone 2.5 % ointment, Apply topically 2 (two) times a day Apply twice daily for 14 days to affected areas on face and neck, then stop., Disp: 30 g, Rfl: 0    Multiple Vitamins-Minerals (MULTIVITAMIN ADULT PO), Daily, Disp: , Rfl:     Omeprazole Magnesium (PRILOSEC PO), 1 po prn, Disp: , Rfl:     pantoprazole (PROTONIX) 40 mg tablet, Take 1 tablet (40 mg total) by mouth daily, Disp: 30 tablet, Rfl: 3    polyethylene glycol (GOLYTELY) 4000 mL solution, Take 4,000 mL by mouth once for 1 dose, Disp: 4000 mL, Rfl: 0    PREBIOTIC PRODUCT PO, Take by mouth, Disp: , Rfl:     Probiotic Product (PROBIOTIC-10 PO), Probiotic  1 tab daily, Disp: , Rfl:     sodium chloride (OCEAN) 0.65 % nasal spray, 1 spray into each nostril as needed for congestion, Disp: 30 mL, Rfl: 0    clobetasol (TEMOVATE) 0.05 % ointment, Apply topically 2 (two) times a day Apply twice daily for 14 days straight to palms, then stop (Patient not taking: Reported on 11/13/2023), Disp: 30 g, Rfl: 0    hydrOXYzine HCL (ATARAX) 25 mg tablet, Take 1 tablet (25 mg total) by mouth every 6 (six) hours as needed for anxiety (Patient not taking: Reported on 11/13/2023), Disp: 30 tablet, Rfl: 0    ketoconazole (NIZORAL) 2 % cream, Apply topically daily (Patient not taking: Reported on 11/13/2023), Disp: 15 g, Rfl: 1    ketoconazole (NIZORAL) 2 % shampoo, Apply 1 application topically 2 (two) times a week (Patient not taking: Reported on 11/13/2023), Disp: 100 mL, Rfl: 0    lidocaine (LIDODERM) 5 %, Apply 1 patch topically daily Remove & Discard patch within 12 hours or as directed by MD (Patient not taking: Reported on 11/13/2023), Disp: 10 patch, Rfl: 0    Tranexamic Acid 650 MG TABS, Take 2 tablets (1,300 mg total) by mouth 3 (three) times a day (Patient not taking: Reported on 11/13/2023), Disp: 5 tablet, Rfl: 4    triamcinolone (KENALOG) 0.1 % ointment, Apply topically 2 (two) times a day Apply to hands for 2 weeks at a time with one week inbetween (after using clobetasol for 2 weeks) (Patient not taking: Reported on 11/13/2023), Disp: 30 g, Rfl: 0    Past Medical History:   Diagnosis Date    Anxiety     Autoimmune disorder ([address])     COVID 2022    fever    Crohn disease ([address])     Ear infection        Past Surgical History:   Procedure Laterality Date     SECTION      COLONOSCOPY      FACIAL SCAR REVISION Right 2022    Procedure: SCAR REVISION OF RIGHT FOREHEAD AND RIGHT NASOLABIAL FOLD;  Surgeon: Layne Massey MD;  [facility];  Service: Plastics    IN ADJT/REARGMT F/C/C/M/N/AX/G/H/F 10.1-30.0 SQ CM Right 2022    Procedure: COMPLEX CLOSURE VERSUS ZPLASTY;  Surgeon: Layne Massey MD;  [facility];  Service: Plastics    UPPER GASTROINTESTINAL ENDOSCOPY         Family History   Problem Relation Age of Onset    Obesity Mother     Hypertension Mother     Obesity Father     Hypertension Father     Colon cancer Neg Hx     Liver disease Neg Hx     Breast cancer Neg Hx         reports that she has never smoked. She has never used smokeless tobacco. She reports current alcohol use of about 2.0 standard drinks of alcohol per week. She reports that she does not use drugs.       Physical Exam:    /86 (BP Location: Right arm, Patient Position: Sitting, Cuff Size: Standard)   Pulse 84   Ht 5' (1.524 m)   Wt 55.8 kg (123 lb)   SpO2 98%   BMI 24.02 kg/m²     Gen: wn/wd, NAD  HEENT: anicteric, MMM, no cervical LAD  CVS: RRR, no m/r/g  CHEST: CTA b/l  ABD: +BS, soft, no hepatosplenomegaly, mild left upper quadrant discomfort  EXT: no c/c/e  NEURO: aaox3  SKIN: NO rashes

## 2023-11-13 NOTE — PATIENT INSTRUCTIONS
Scheduled date of EGD/colonoscopy (as of today): 12/6/23  Physician performing EGD/colonoscopy: Kirsten  Location of EGD/colonoscopy: [address]  Desired bowel prep reviewed with patient: Golytely  Instructions reviewed with patient by: Mabel BABCOCK  Clearances:  n/a

## 2023-11-13 NOTE — LETTER
November 13, 2023     Rocio Patel,   [address] [facility], [place],  [address] [facility]    Patient: Atul Fallon   YOB: 1980   Date of Visit: 11/13/2023       Dear Dr. Aries Curz: Thank you for referring Atul Fallon to me for evaluation. Below are my notes for this consultation. If you have questions, please do not hesitate to call me. I look forward to following your patient along with you. Sincerely,        Kimberlyn Monterroso MD        CC: No Recipients    Kimberlyn Monterroso MD  11/13/2023  4:29 PM  Sign when Signing Visit  [facility]  Atul Fallon 37 y.o. female MRN: [id number]            Assessment & Plan:  Pleasant 80-year-old female with history of ileal Crohn's disease, recent bouts of atypical chest pain and cold sensation. Atypical chest pain and global sensation: Most likely secondary reflux  -She started taking omeprazole with relief of symptoms  -We will prescribe pantoprazole 40 mg daily  Full schedule for an upper endoscopy for further evaluation  -Patient has follow-up with PCP next week, consider cardiopulmonary evaluation at that time    2. Ileal Crohn's disease: Clinically doing much better on azathioprine  -Had adverse reactions to Humira  -We will proceed with repeat colonoscopy, last examination was 3 years ago to evaluate for mucosal healing  -Patient aware of risks of procedures including bleeding, surgery, perforation next-Dulcolax and GoLytely prep      Jocy was seen today for schedule colonoscopy and gerd. Diagnoses and all orders for this visit:    Crohn's disease involving terminal ileum ([address])  -     Basic metabolic panel; Future  -     CBC and differential; Future  -     Hepatic function panel; Future  -     Lipase; Future  -     Colonoscopy; Future  -     polyethylene glycol (GOLYTELY) 4000 mL solution; Take 4,000 mL by mouth once for 1 dose    Globus sensation  -     EGD;  Future  -     pantoprazole (PROTONIX) 40 mg tablet; Take 1 tablet (40 mg total) by mouth daily    Other orders  -     Diet NPO; Sips with meds; Standing  -     Void on call to OR; Standing            _____________________________________________________________        CC: Follow-up    HPI:  Sarah Bhatti is a 37 y. o.female who is here for follow-up. 42-year-old who with a history of ileal Crohn's disease, maintained on azathioprine. She had adverse reaction to Humira and was discontinued, she is done very well on azathioprine for many years. Discussed with her and she is aware of potential long-term side effects. Overall she been doing well, has had intermittent bouts of left upper quadrant pain with symptoms has been associated with Crohn's in the past.  She took dicyclomine 1 time and that seemed to resolve the pain. She had recurrence of globus sensation. Recently had some atypical chest pain which lasted almost a full day. Denies any nausea, vomiting, dysphagia. Weight has been stable. Does report having some dizziness, has been increasing her hours at work and the dizziness seems to get better when she sleeps and she is not as tired. ROS:  The remainder of the ROS was negative except for the pertinent positives mentioned in HPI.       Allergies: Epinephrine    Medications:   Current Outpatient Medications:   •  Ascorbic Acid (vitamin C) 1000 MG tablet, Take 1,000 mg by mouth daily, Disp: , Rfl:   •  azaTHIOprine (IMURAN) 50 mg tablet, Take 1 tablet (50 mg total) by mouth daily, Disp: 90 tablet, Rfl: 3  •  cholecalciferol (VITAMIN D3) 1,000 units tablet, Take 1,000 Units by mouth daily, Disp: , Rfl:   •  ciprofloxacin-dexamethasone (CIPRODEX) otic suspension, Administer 4 drops into both ears 2 (two) times a day (Patient taking differently: Administer 4 drops into both ears 2 (two) times a day As  needed), Disp: 7.5 mL, Rfl: 2  •  dicyclomine (BENTYL) 20 mg tablet, Take 1 tablet (20 mg total) by mouth 2 (two) times a day as needed (abdominal pain), Disp: 30 tablet, Rfl: 2  •  escitalopram (LEXAPRO) 10 mg tablet, Take 1 tablet (10 mg total) by mouth daily, Disp: 90 tablet, Rfl: 2  •  hydrocortisone (ANUSOL-HC) 2.5 % rectal cream, Apply topically 2 (two) times a day Apply topically 2 times daily for 7-10 days, Disp: 28 g, Rfl: 0  •  hydrocortisone (ANUSOL-HC) 25 mg suppository, Insert 1 suppository (25 mg total) into the rectum 2 (two) times a day, Disp: 30 suppository, Rfl: 3  •  hydrocortisone 2.5 % ointment, Apply topically 2 (two) times a day Apply twice daily for 14 days to affected areas on face and neck, then stop., Disp: 30 g, Rfl: 0  •  Multiple Vitamins-Minerals (MULTIVITAMIN ADULT PO), Daily, Disp: , Rfl:   •  Omeprazole Magnesium (PRILOSEC PO), 1 po prn, Disp: , Rfl:   •  pantoprazole (PROTONIX) 40 mg tablet, Take 1 tablet (40 mg total) by mouth daily, Disp: 30 tablet, Rfl: 3  •  polyethylene glycol (GOLYTELY) 4000 mL solution, Take 4,000 mL by mouth once for 1 dose, Disp: 4000 mL, Rfl: 0  •  PREBIOTIC PRODUCT PO, Take by mouth, Disp: , Rfl:   •  Probiotic Product (PROBIOTIC-10 PO), Probiotic  1 tab daily, Disp: , Rfl:   •  sodium chloride (OCEAN) 0.65 % nasal spray, 1 spray into each nostril as needed for congestion, Disp: 30 mL, Rfl: 0  •  clobetasol (TEMOVATE) 0.05 % ointment, Apply topically 2 (two) times a day Apply twice daily for 14 days straight to palms, then stop (Patient not taking: Reported on 11/13/2023), Disp: 30 g, Rfl: 0  •  hydrOXYzine HCL (ATARAX) 25 mg tablet, Take 1 tablet (25 mg total) by mouth every 6 (six) hours as needed for anxiety (Patient not taking: Reported on 11/13/2023), Disp: 30 tablet, Rfl: 0  •  ketoconazole (NIZORAL) 2 % cream, Apply topically daily (Patient not taking: Reported on 11/13/2023), Disp: 15 g, Rfl: 1  •  ketoconazole (NIZORAL) 2 % shampoo, Apply 1 application topically 2 (two) times a week (Patient not taking: Reported on 11/13/2023), Disp: 100 mL, Rfl: 0  •  lidocaine (LIDODERM) 5 %, Apply 1 patch topically daily Remove & Discard patch within 12 hours or as directed by MD (Patient not taking: Reported on 2023), Disp: 10 patch, Rfl: 0  •  Tranexamic Acid 650 MG TABS, Take 2 tablets (1,300 mg total) by mouth 3 (three) times a day (Patient not taking: Reported on 2023), Disp: 5 tablet, Rfl: 4  •  triamcinolone (KENALOG) 0.1 % ointment, Apply topically 2 (two) times a day Apply to hands for 2 weeks at a time with one week inbetween (after using clobetasol for 2 weeks) (Patient not taking: Reported on 2023), Disp: 30 g, Rfl: 0    Past Medical History:   Diagnosis Date   • Anxiety    • Autoimmune disorder ([address])    • COVID 2022    fever   • Crohn disease ([address])    • Ear infection        Past Surgical History:   Procedure Laterality Date   •  SECTION     • COLONOSCOPY     • FACIAL SCAR REVISION Right 2022    Procedure: SCAR REVISION OF RIGHT FOREHEAD AND RIGHT NASOLABIAL FOLD;  Surgeon: Noelle Owens MD;  [facility];  Service: Plastics   • [place] ADJT/REARGMT F/C/C/M/N/AX/G/H/F 10.1-30.0 SQ CM Right 2022    Procedure: COMPLEX CLOSURE VERSUS ZPLASTY;  Surgeon: Noelle Owens MD;  [facility];  Service: Plastics   • UPPER GASTROINTESTINAL ENDOSCOPY         Family History   Problem Relation Age of Onset   • Obesity Mother    • Hypertension Mother    • Obesity Father    • Hypertension Father    • Colon cancer Neg Hx    • Liver disease Neg Hx    • Breast cancer Neg Hx         reports that she has never smoked. She has never used smokeless tobacco. She reports current alcohol use of about 2.0 standard drinks of alcohol per week. She reports that she does not use drugs.       Physical Exam:    /86 (BP Location: Right arm, Patient Position: Sitting, Cuff Size: Standard)   Pulse 84   Ht 5' (1.524 m)   Wt 55.8 kg (123 lb)   SpO2 98%   BMI 24.02 kg/m²     Gen: wn/wd, NAD  HEENT: anicteric, MMM, no cervical LAD  CVS: RRR, no m/r/g  CHEST: CTA b/l  ABD: +BS, soft, no hepatosplenomegaly, mild left upper quadrant discomfort  EXT: no c/c/e  NEURO: aaox3  SKIN: NO rashes

## 2023-11-15 ENCOUNTER — TELEPHONE (OUTPATIENT)
Dept: FAMILY MEDICINE CLINIC | Facility: CLINIC | Age: 43
End: 2023-11-15

## 2023-11-15 NOTE — TELEPHONE ENCOUNTER
Called patient to change the appointment for her physical because she was scheduled incorrectly. PCP had an opening on 11/30 at 3:20pm and patient was agreeable with that date and time. Patient states she needs her flu shot for work before then, scheduled for nurse visit on 11/16 at 4pm to receive vaccine. Patient expressed some concern because of her appointment being changed so many times. Apologized to patient and expressed the miscommunication amongst our office and advised it would be brought to the supervisors attention.

## 2023-11-30 ENCOUNTER — OFFICE VISIT (OUTPATIENT)
Dept: FAMILY MEDICINE CLINIC | Facility: CLINIC | Age: 43
End: 2023-11-30

## 2023-11-30 VITALS
OXYGEN SATURATION: 99 % | BODY MASS INDEX: 23.26 KG/M2 | SYSTOLIC BLOOD PRESSURE: 134 MMHG | TEMPERATURE: 98.2 F | DIASTOLIC BLOOD PRESSURE: 85 MMHG | WEIGHT: 123.2 LBS | RESPIRATION RATE: 16 BRPM | HEART RATE: 80 BPM | HEIGHT: 61 IN

## 2023-11-30 DIAGNOSIS — Z23 ENCOUNTER FOR IMMUNIZATION: ICD-10-CM

## 2023-11-30 DIAGNOSIS — R53.83 OTHER FATIGUE: ICD-10-CM

## 2023-11-30 DIAGNOSIS — Z00.00 ANNUAL PHYSICAL EXAM: Primary | ICD-10-CM

## 2023-11-30 PROCEDURE — 99396 PREV VISIT EST AGE 40-64: CPT | Performed by: FAMILY MEDICINE

## 2023-11-30 PROCEDURE — 90715 TDAP VACCINE 7 YRS/> IM: CPT | Performed by: FAMILY MEDICINE

## 2023-11-30 PROCEDURE — 99213 OFFICE O/P EST LOW 20 MIN: CPT | Performed by: FAMILY MEDICINE

## 2023-11-30 PROCEDURE — 90471 IMMUNIZATION ADMIN: CPT | Performed by: FAMILY MEDICINE

## 2023-11-30 NOTE — ASSESSMENT & PLAN NOTE
- Suspect likely 2/2 mental fatigue after working.  Pt reports looking at screens for most of the day    Plan:  - Will order TSH, has not been checked since 2021  - Recommend eye exam  - Recommend continue adequate sleep schedule

## 2023-11-30 NOTE — PROGRESS NOTES
[address] [facility]    NAME: Jc Purdy  AGE: 37 y.o. SEX: female  : 1980     DATE: 2023     Assessment and Plan:     Problem List Items Addressed This Visit          Other    Annual physical exam - Primary    Other fatigue     - Suspect likely 2/2 mental fatigue after working. Pt reports looking at screens for most of the day    Plan:  - Will order TSH, has not been checked since   - Recommend eye exam  - Recommend continue adequate sleep schedule         Relevant Orders    TSH, 3rd generation    Ambulatory Referral to Ophthalmology     Other Visit Diagnoses       Encounter for immunization        Relevant Orders    TDAP VACCINE GREATER THAN OR EQUAL TO 8YO IM (Completed)            Immunizations and preventive care screenings were discussed with patient today. Appropriate education was printed on patient's after visit summary. Counseling:  Exercise: the importance of regular exercise/physical activity was discussed. Recommend exercise 3-5 times per week for at least 30 minutes. Depression Screening and Follow-up Plan: Patient was screened for depression during today's encounter. They screened negative with a PHQ-2 score of 0. Return in about 1 year (around 2024) for Annual physical.     Chief Complaint:     Chief Complaint   Patient presents with    Physical Exam      History of Present Illness:     Adult Annual Physical   Patient here for a comprehensive physical exam. The patient reports problems:  - Dizziness: especially when tired. When mentally or physically tired. Resolves with sleep. Feels like brain is spinning, room is not spinning. Denies allergies, blurry vision. Last time happened 1 month ago. Told GI doctor. Occurs in multiple locations - at work and at home. Diet and Physical Activity  Diet/Nutrition: well balanced diet. Exercise:  yoga everyday when she can . Depression Screening  PHQ-2/9 Depression Screening    Little interest or pleasure in doing things: 0 - not at all  Feeling down, depressed, or hopeless: 0 - not at all  PHQ-2 Score: 0  PHQ-2 Interpretation: Negative depression screen       General Health  Sleep: gets 7-8 hours of sleep on average. Hearing: decreased - left. Follows with ENT  Vision: no vision problems. Dental:  last appt -July . /GYN Health  Follows with gynecology? yes   Patient is: perimenopausal  Last menstrual period: 23  Contraceptive method:  Esure . Sexually active    Preventative Screening  Breast: Last [id number]  Last [id number] - negative  STD screening declines  Colon: Last [id number]  Denies Family history of breast cancer, colon cancer, prostate cancer    Immunizations  Last TDAP agreeable today  Last flu   COVID       [facility]  Do you have an advanced directive? no  Do you have a durable medical power of ? Yes -      Review of Systems:     Review of Systems   Neurological:  Positive for dizziness.         Past Medical History:     Past Medical History:   Diagnosis Date    Anxiety     Autoimmune disorder ([facility])     COVID 2022    fever    Crohn disease ([facility])     Ear infection       Past Surgical History:     Past Surgical History:   Procedure Laterality Date     SECTION      COLONOSCOPY      FACIAL SCAR REVISION Right 2022    Procedure: SCAR REVISION OF RIGHT FOREHEAD AND RIGHT NASOLABIAL FOLD;  Surgeon: Dinesh Knight MD;  [facility];  Service: Plastics    NH ADJT/REARGMT F/C/C/M/N/AX/G/H/F 10.1-30.0 SQ CM Right 2022    Procedure: COMPLEX CLOSURE VERSUS Niecy Furnish;  Surgeon: Dinesh Knight MD;  [facility];  Service: Plastics    UPPER GASTROINTESTINAL ENDOSCOPY        Social History:     Social History     Socioeconomic History    Marital status: /Civil Union     Spouse name: None    Number of children: None    Years of education: None    Highest education level: None   Occupational History    None   Tobacco Use    Smoking status: Never    Smokeless tobacco: Never   Vaping Use    Vaping Use: Never used   Substance and Sexual Activity    Alcohol use: Yes     Alcohol/week: 2.0 standard drinks of alcohol     Types: 1 Glasses of wine, 1 Shots of liquor per week     Comment: weekends    Drug use: No    Sexual activity: Yes     Partners: Male     Birth control/protection: Implant     Comment:    Other Topics Concern    None   Social History Narrative    None     Social Determinants of Health     Financial Resource Strain: Low Risk  (3/10/2023)    Overall Financial Resource Strain (CARDIA)     Difficulty of Paying Living Expenses: Not hard at all   Food Insecurity: No Food Insecurity (3/10/2023)    Hunger Vital Sign     Worried About Running Out of Food in the Last Year: Never true     Ran Out of Food in the Last Year: Never true   Transportation Needs: No Transportation Needs (3/10/2023)    PRAPARE - Transportation     Lack of Transportation (Medical): No     Lack of Transportation (Non-Medical):  No   Physical Activity: Not on file   Stress: No Stress Concern Present (11/30/2023)    [address] [place] [address]     Feeling of Stress : Not at all   Social Connections: Not on file   Intimate Partner Violence: Not At Risk (3/10/2023)    Humiliation, Afraid, Rape, and Kick questionnaire     Fear of Current or Ex-Partner: No     Emotionally Abused: No     Physically Abused: No     Sexually Abused: No   Housing Stability: [address]  (3/10/2023)    Housing Stability Vital Sign     Unable to Pay for Housing in the Last Year: No     Number of [address] in the Last Year: 1     Unstable Housing in the Last Year: No     Tobacco - denies  Alcohol - on weekends  Illicit Drugs - denies     Family History:     Family History   Problem Relation Age of Onset    Obesity Mother     Hypertension Mother Obesity Father     Hypertension Father     Colon cancer Neg Hx     Liver disease Neg Hx     Breast cancer Neg Hx      Mom - HTN     Current Medications:     Current Outpatient Medications   Medication Sig Dispense Refill    Ascorbic Acid (vitamin C) 1000 MG tablet Take 1,000 mg by mouth daily      azaTHIOprine (IMURAN) 50 mg tablet Take 1 tablet (50 mg total) by mouth daily 90 tablet 3    cholecalciferol (VITAMIN D3) 1,000 units tablet Take 1,000 Units by mouth daily      dicyclomine (BENTYL) 20 mg tablet Take 1 tablet (20 mg total) by mouth 2 (two) times a day as needed (abdominal pain) 30 tablet 2    escitalopram (LEXAPRO) 10 mg tablet Take 1 tablet (10 mg total) by mouth daily 90 tablet 2    hydrocortisone (ANUSOL-HC) 2.5 % rectal cream Apply topically 2 (two) times a day Apply topically 2 times daily for 7-10 days 28 g 0    hydrocortisone (ANUSOL-HC) 25 mg suppository Insert 1 suppository (25 mg total) into the rectum 2 (two) times a day 30 suppository 3    Multiple Vitamins-Minerals (MULTIVITAMIN ADULT PO) Daily      pantoprazole (PROTONIX) 40 mg tablet Take 1 tablet (40 mg total) by mouth daily 30 tablet 3    PREBIOTIC PRODUCT PO Take by mouth      Probiotic Product (PROBIOTIC-10 PO) Probiotic   1 tab daily      sodium chloride (OCEAN) 0.65 % nasal spray 1 spray into each nostril as needed for congestion 30 mL 0    ciprofloxacin-dexamethasone (CIPRODEX) otic suspension Administer 4 drops into both ears 2 (two) times a day (Patient taking differently: Administer 4 drops into both ears 2 (two) times a day As  needed) 7.5 mL 2    clobetasol (TEMOVATE) 0.05 % ointment Apply topically 2 (two) times a day Apply twice daily for 14 days straight to palms, then stop (Patient not taking: Reported on 11/13/2023) 30 g 0    polyethylene glycol (GOLYTELY) 4000 mL solution Take 4,000 mL by mouth once for 1 dose 4000 mL 0    Tranexamic Acid 650 MG TABS Take 2 tablets (1,300 mg total) by mouth 3 (three) times a day (Patient not taking: Reported on 11/13/2023) 5 tablet 4    triamcinolone (KENALOG) 0.1 % ointment Apply topically 2 (two) times a day Apply to hands for 2 weeks at a time with one week inbetween (after using clobetasol for 2 weeks) (Patient not taking: Reported on 11/13/2023) 30 g 0     No current facility-administered medications for this visit. Allergies: Allergies   Allergen Reactions    Epinephrine Other (See Comments)     numbness     Epinephrine - at dentist, numbness     Physical Exam:     /85   Pulse 80   Temp 98.2 °F (36.8 °C)   Resp 16   Ht 5' 0.8" (1.544 m)   Wt 55.9 kg (123 lb 3.2 oz)   SpO2 99%   BMI 23.43 kg/m²     Physical Exam  Constitutional:       Appearance: Normal appearance. HENT:      Head: Normocephalic and atraumatic. Right Ear: Tympanic membrane, ear canal and external ear normal. There is no impacted cerumen. Left Ear: Tympanic membrane, ear canal and external ear normal. There is no impacted cerumen. Mouth/Throat:      Mouth: Mucous membranes are moist.      Pharynx: No oropharyngeal exudate or posterior oropharyngeal erythema. Cardiovascular:      Rate and Rhythm: Normal rate and regular rhythm. Pulmonary:      Breath sounds: Normal breath sounds. Abdominal:      General: Bowel sounds are normal.      Palpations: Abdomen is soft. Skin:     General: Skin is warm and dry. Neurological:      Mental Status: She is alert.    Psychiatric:         Mood and Affect: Mood normal.         Behavior: Behavior normal.          DO Javier Guaman

## 2023-12-02 ENCOUNTER — APPOINTMENT (OUTPATIENT)
Dept: LAB | Facility: HOSPITAL | Age: 43
End: 2023-12-02
Payer: COMMERCIAL

## 2023-12-02 DIAGNOSIS — R53.83 OTHER FATIGUE: ICD-10-CM

## 2023-12-02 DIAGNOSIS — K50.00 CROHN'S DISEASE INVOLVING TERMINAL ILEUM (HCC): ICD-10-CM

## 2023-12-02 LAB
ALBUMIN SERPL BCP-MCNC: 4.2 G/DL (ref 3.5–5)
ALP SERPL-CCNC: 70 U/L (ref 34–104)
ALT SERPL W P-5'-P-CCNC: 28 U/L (ref 7–52)
ANION GAP SERPL CALCULATED.3IONS-SCNC: 4 MMOL/L
AST SERPL W P-5'-P-CCNC: 25 U/L (ref 13–39)
BASOPHILS # BLD AUTO: 0.02 THOUSANDS/ÂΜL (ref 0–0.1)
BASOPHILS NFR BLD AUTO: 0 % (ref 0–1)
BILIRUB DIRECT SERPL-MCNC: 0.04 MG/DL (ref 0–0.2)
BILIRUB SERPL-MCNC: 0.49 MG/DL (ref 0.2–1)
BUN SERPL-MCNC: 14 MG/DL (ref 5–25)
CALCIUM SERPL-MCNC: 9.3 MG/DL (ref 8.4–10.2)
CHLORIDE SERPL-SCNC: 107 MMOL/L (ref 96–108)
CO2 SERPL-SCNC: 28 MMOL/L (ref 21–32)
CREAT SERPL-MCNC: 0.56 MG/DL (ref 0.6–1.3)
EOSINOPHIL # BLD AUTO: 0.14 THOUSAND/ÂΜL (ref 0–0.61)
EOSINOPHIL NFR BLD AUTO: 3 % (ref 0–6)
ERYTHROCYTE [DISTWIDTH] IN BLOOD BY AUTOMATED COUNT: 12.3 % (ref 11.6–15.1)
GFR SERPL CREATININE-BSD FRML MDRD: 114 ML/MIN/1.73SQ M
GLUCOSE SERPL-MCNC: 89 MG/DL (ref 65–140)
HCT VFR BLD AUTO: 34.2 % (ref 34.8–46.1)
HGB BLD-MCNC: 11.5 G/DL (ref 11.5–15.4)
IMM GRANULOCYTES # BLD AUTO: 0.01 THOUSAND/UL (ref 0–0.2)
IMM GRANULOCYTES NFR BLD AUTO: 0 % (ref 0–2)
LIPASE SERPL-CCNC: 59 U/L (ref 11–82)
LYMPHOCYTES # BLD AUTO: 1.09 THOUSANDS/ÂΜL (ref 0.6–4.47)
LYMPHOCYTES NFR BLD AUTO: 20 % (ref 14–44)
MCH RBC QN AUTO: 30.8 PG (ref 26.8–34.3)
MCHC RBC AUTO-ENTMCNC: 33.6 G/DL (ref 31.4–37.4)
MCV RBC AUTO: 92 FL (ref 82–98)
MONOCYTES # BLD AUTO: 0.39 THOUSAND/ÂΜL (ref 0.17–1.22)
MONOCYTES NFR BLD AUTO: 7 % (ref 4–12)
NEUTROPHILS # BLD AUTO: 3.9 THOUSANDS/ÂΜL (ref 1.85–7.62)
NEUTS SEG NFR BLD AUTO: 70 % (ref 43–75)
NRBC BLD AUTO-RTO: 0 /100 WBCS
PLATELET # BLD AUTO: 200 THOUSANDS/UL (ref 149–390)
PMV BLD AUTO: 11.3 FL (ref 8.9–12.7)
POTASSIUM SERPL-SCNC: 4.1 MMOL/L (ref 3.5–5.3)
PROT SERPL-MCNC: 7.4 G/DL (ref 6.4–8.4)
RBC # BLD AUTO: 3.73 MILLION/UL (ref 3.81–5.12)
SODIUM SERPL-SCNC: 139 MMOL/L (ref 135–147)
TSH SERPL DL<=0.05 MIU/L-ACNC: 3.21 UIU/ML (ref 0.45–4.5)
WBC # BLD AUTO: 5.55 THOUSAND/UL (ref 4.31–10.16)

## 2023-12-02 PROCEDURE — 83690 ASSAY OF LIPASE: CPT

## 2023-12-02 PROCEDURE — 85025 COMPLETE CBC W/AUTO DIFF WBC: CPT

## 2023-12-02 PROCEDURE — 80076 HEPATIC FUNCTION PANEL: CPT

## 2023-12-02 PROCEDURE — 84443 ASSAY THYROID STIM HORMONE: CPT

## 2023-12-02 PROCEDURE — 36415 COLL VENOUS BLD VENIPUNCTURE: CPT

## 2023-12-02 PROCEDURE — 80048 BASIC METABOLIC PNL TOTAL CA: CPT

## 2023-12-05 RX ORDER — SODIUM CHLORIDE, SODIUM LACTATE, POTASSIUM CHLORIDE, CALCIUM CHLORIDE 600; 310; 30; 20 MG/100ML; MG/100ML; MG/100ML; MG/100ML
75 INJECTION, SOLUTION INTRAVENOUS CONTINUOUS
Status: CANCELLED | OUTPATIENT
Start: 2023-12-05

## 2023-12-06 ENCOUNTER — HOSPITAL ENCOUNTER (OUTPATIENT)
Dept: GASTROENTEROLOGY | Facility: AMBULARY SURGERY CENTER | Age: 43
Setting detail: OUTPATIENT SURGERY
Discharge: HOME | End: 2023-12-06
Attending: INTERNAL MEDICINE
Payer: COMMERCIAL

## 2023-12-06 ENCOUNTER — ANESTHESIA EVENT (OUTPATIENT)
Dept: GASTROENTEROLOGY | Facility: AMBULARY SURGERY CENTER | Age: 43
End: 2023-12-06

## 2023-12-06 ENCOUNTER — ANESTHESIA (OUTPATIENT)
Dept: GASTROENTEROLOGY | Facility: AMBULARY SURGERY CENTER | Age: 43
End: 2023-12-06

## 2023-12-06 VITALS
SYSTOLIC BLOOD PRESSURE: 128 MMHG | TEMPERATURE: 97.5 F | HEART RATE: 75 BPM | RESPIRATION RATE: 18 BRPM | DIASTOLIC BLOOD PRESSURE: 65 MMHG | OXYGEN SATURATION: 100 %

## 2023-12-06 DIAGNOSIS — R09.A2 GLOBUS SENSATION: ICD-10-CM

## 2023-12-06 DIAGNOSIS — K50.00 CROHN'S DISEASE INVOLVING TERMINAL ILEUM (HCC): ICD-10-CM

## 2023-12-06 PROBLEM — K21.9 GASTROESOPHAGEAL REFLUX DISEASE: Status: ACTIVE | Noted: 2023-12-06

## 2023-12-06 LAB
EXT PREGNANCY TEST URINE: NEGATIVE
EXT. CONTROL: NORMAL

## 2023-12-06 PROCEDURE — 45380 COLONOSCOPY AND BIOPSY: CPT | Performed by: INTERNAL MEDICINE

## 2023-12-06 PROCEDURE — 81025 URINE PREGNANCY TEST: CPT | Performed by: ANESTHESIOLOGY

## 2023-12-06 PROCEDURE — 88305 TISSUE EXAM BY PATHOLOGIST: CPT | Performed by: PATHOLOGY

## 2023-12-06 PROCEDURE — 43239 EGD BIOPSY SINGLE/MULTIPLE: CPT | Performed by: INTERNAL MEDICINE

## 2023-12-06 RX ORDER — SODIUM CHLORIDE, SODIUM LACTATE, POTASSIUM CHLORIDE, CALCIUM CHLORIDE 600; 310; 30; 20 MG/100ML; MG/100ML; MG/100ML; MG/100ML
75 INJECTION, SOLUTION INTRAVENOUS CONTINUOUS
Status: DISCONTINUED | OUTPATIENT
Start: 2023-12-06 | End: 2023-12-10 | Stop reason: HOSPADM

## 2023-12-06 RX ORDER — LIDOCAINE HYDROCHLORIDE 10 MG/ML
INJECTION, SOLUTION EPIDURAL; INFILTRATION; INTRACAUDAL; PERINEURAL AS NEEDED
Status: DISCONTINUED | OUTPATIENT
Start: 2023-12-06 | End: 2023-12-06

## 2023-12-06 RX ORDER — PHENYLEPHRINE HCL IN 0.9% NACL 1 MG/10 ML
SYRINGE (ML) INTRAVENOUS AS NEEDED
Status: DISCONTINUED | OUTPATIENT
Start: 2023-12-06 | End: 2023-12-06

## 2023-12-06 RX ORDER — PROPOFOL 10 MG/ML
INJECTION, EMULSION INTRAVENOUS AS NEEDED
Status: DISCONTINUED | OUTPATIENT
Start: 2023-12-06 | End: 2023-12-06

## 2023-12-06 RX ORDER — PROPOFOL 10 MG/ML
INJECTION, EMULSION INTRAVENOUS CONTINUOUS PRN
Status: DISCONTINUED | OUTPATIENT
Start: 2023-12-06 | End: 2023-12-06

## 2023-12-06 RX ORDER — SODIUM CHLORIDE, SODIUM LACTATE, POTASSIUM CHLORIDE, CALCIUM CHLORIDE 600; 310; 30; 20 MG/100ML; MG/100ML; MG/100ML; MG/100ML
INJECTION, SOLUTION INTRAVENOUS CONTINUOUS PRN
Status: DISCONTINUED | OUTPATIENT
Start: 2023-12-06 | End: 2023-12-06

## 2023-12-06 RX ADMIN — SODIUM CHLORIDE, SODIUM LACTATE, POTASSIUM CHLORIDE, AND CALCIUM CHLORIDE 75 ML/HR: .6; .31; .03; .02 INJECTION, SOLUTION INTRAVENOUS at 09:36

## 2023-12-06 RX ADMIN — PROPOFOL 30 MG: 10 INJECTION, EMULSION INTRAVENOUS at 10:27

## 2023-12-06 RX ADMIN — Medication 100 MCG: at 10:38

## 2023-12-06 RX ADMIN — SODIUM CHLORIDE, SODIUM LACTATE, POTASSIUM CHLORIDE, AND CALCIUM CHLORIDE: .6; .31; .03; .02 INJECTION, SOLUTION INTRAVENOUS at 09:23

## 2023-12-06 RX ADMIN — PROPOFOL 100 MCG/KG/MIN: 10 INJECTION, EMULSION INTRAVENOUS at 10:27

## 2023-12-06 RX ADMIN — PROPOFOL 170 MG: 10 INJECTION, EMULSION INTRAVENOUS at 10:24

## 2023-12-06 RX ADMIN — LIDOCAINE HYDROCHLORIDE 50 MG: 10 INJECTION, SOLUTION EPIDURAL; INFILTRATION; INTRACAUDAL; PERINEURAL at 10:24

## 2023-12-06 RX ADMIN — PROPOFOL 50 MG: 10 INJECTION, EMULSION INTRAVENOUS at 10:38

## 2023-12-06 NOTE — H&P
History and Physical - [facility]  Thelma Rushing 37 y.o. female MRN: [id number]    HPI: Thelma Rushing is a 37y.o. year old female who presents with GERD, atypical chest pain, hx of crohns.        Review of Systems    Historical Information   Past Medical History:   Diagnosis Date    Anxiety     Autoimmune disorder ([address])     COVID 2022    fever    Crohn disease ([address])     Ear infection      Past Surgical History:   Procedure Laterality Date     SECTION      COLONOSCOPY      FACIAL SCAR REVISION Right 2022    Procedure: SCAR REVISION OF RIGHT FOREHEAD AND RIGHT NASOLABIAL FOLD;  Surgeon: Rosy Kathleen MD;  [facility];  Service: Plastics    NE ADJT/REARGMT F/C/C/M/N/AX/G/H/F 10.1-30.0 SQ CM Right 2022    Procedure: COMPLEX CLOSURE VERSUS ZPLASTY;  Surgeon: Rosy Kathleen MD;  [facility];  Service: Plastics    UPPER GASTROINTESTINAL ENDOSCOPY       Social History   Social History     Substance and Sexual Activity   Alcohol Use Yes    Alcohol/week: 2.0 standard drinks of alcohol    Types: 1 Glasses of wine, 1 Shots of liquor per week    Comment: weekends     Social History     Substance and Sexual Activity   Drug Use No     Social History     Tobacco Use   Smoking Status Never   Smokeless Tobacco Never     Family History   Problem Relation Age of Onset    Obesity Mother     Hypertension Mother     Obesity Father     Hypertension Father     Colon cancer Neg Hx     Liver disease Neg Hx     Breast cancer Neg Hx        Meds/Allergies     (Not in a hospital admission)      Allergies   Allergen Reactions    Epinephrine Other (See Comments)     numbness       Objective     /56   Pulse 66   Temp 97.5 °F (36.4 °C) (Tympanic)   Resp 16   SpO2 100%       PHYSICAL EXAM    Gen: NAD  CV: RRR  CHEST: Clear  ABD: soft, NT/ND  EXT: no edema  Neuro: AAO      ASSESSMENT/PLAN:  This is a 37y.o. year old female here for GERD, atypical chest pain, hx of crohns.      PLAN:   Procedure: egd/colonoscopy

## 2023-12-06 NOTE — ANESTHESIA POSTPROCEDURE EVALUATION
Post-Op Assessment Note    CV Status:  Stable  Pain Score: 0    Pain management: adequate       Mental Status:  Sleepy   Hydration Status:  Euvolemic   PONV Controlled:  Controlled   Airway Patency:  Patent     Post Op Vitals Reviewed: Yes      Staff: Anesthesiologist, SULMA               BP (!) 87/50 (12/06/23 1049)    Temp      Pulse 86 (12/06/23 1049)   Resp 12 (12/06/23 1049)    SpO2 98 % (12/06/23 1049)

## 2023-12-06 NOTE — ANESTHESIA PREPROCEDURE EVALUATION
Procedure:  COLONOSCOPY  EGD    Relevant Problems   GI/HEPATIC   (+) Gastroesophageal reflux disease      MUSCULOSKELETAL   (+) Lower back pain      NEURO/PSYCH   (+) Anxiety        Physical Exam    Airway    Mallampati score: II  TM Distance: >3 FB  Neck ROM: full     Dental       Cardiovascular  Rhythm: regular, Rate: normal    Pulmonary   Breath sounds clear to auscultation    Other Findings  post-pubertal.      Anesthesia Plan  ASA Score- 2     Anesthesia Type- IV sedation with anesthesia with ASA Monitors. Additional Monitors:     Airway Plan:            Plan Factors-    Chart reviewed. Patient is not a current smoker. Induction- intravenous. Postoperative Plan-     Informed Consent- Anesthetic plan and risks discussed with patient. I personally reviewed this patient with the CRNA. Discussed and agreed on the Anesthesia Plan with the CRNA. Portia Grant

## 2023-12-11 PROCEDURE — 88305 TISSUE EXAM BY PATHOLOGIST: CPT | Performed by: PATHOLOGY

## 2024-01-20 DIAGNOSIS — Z00.6 ENCOUNTER FOR EXAMINATION FOR NORMAL COMPARISON OR CONTROL IN CLINICAL RESEARCH PROGRAM: ICD-10-CM

## 2024-01-24 ENCOUNTER — NEW PATIENT (OUTPATIENT)
Dept: URBAN - METROPOLITAN AREA CLINIC 6 | Facility: CLINIC | Age: 44
End: 2024-01-24

## 2024-01-24 DIAGNOSIS — Z01.00: ICD-10-CM

## 2024-01-24 PROCEDURE — 92004 COMPRE OPH EXAM NEW PT 1/>: CPT

## 2024-01-24 ASSESSMENT — TONOMETRY
OS_IOP_MMHG: 14
OD_IOP_MMHG: 16

## 2024-01-24 ASSESSMENT — VISUAL ACUITY
OS_SC: 20/25
OS_SC: J1
OD_PH: 20/25
OD_SC: J1
OD_SC: 20/60

## 2024-01-29 ENCOUNTER — APPOINTMENT (OUTPATIENT)
Dept: LAB | Facility: CLINIC | Age: 44
End: 2024-01-29
Payer: COMMERCIAL

## 2024-01-29 DIAGNOSIS — K50.00 CROHN'S DISEASE INVOLVING TERMINAL ILEUM (HCC): ICD-10-CM

## 2024-01-29 DIAGNOSIS — Z00.6 ENCOUNTER FOR EXAMINATION FOR NORMAL COMPARISON OR CONTROL IN CLINICAL RESEARCH PROGRAM: ICD-10-CM

## 2024-01-29 LAB
ALBUMIN SERPL BCP-MCNC: 4.1 G/DL (ref 3.5–5)
ALP SERPL-CCNC: 64 U/L (ref 34–104)
ALT SERPL W P-5'-P-CCNC: 16 U/L (ref 7–52)
ANION GAP SERPL CALCULATED.3IONS-SCNC: 4 MMOL/L
AST SERPL W P-5'-P-CCNC: 19 U/L (ref 13–39)
BASOPHILS # BLD AUTO: 0.03 THOUSANDS/ÂΜL (ref 0–0.1)
BASOPHILS NFR BLD AUTO: 1 % (ref 0–1)
BILIRUB DIRECT SERPL-MCNC: 0.13 MG/DL (ref 0–0.2)
BILIRUB SERPL-MCNC: 0.64 MG/DL (ref 0.2–1)
BUN SERPL-MCNC: 10 MG/DL (ref 5–25)
CALCIUM SERPL-MCNC: 9.2 MG/DL (ref 8.4–10.2)
CHLORIDE SERPL-SCNC: 107 MMOL/L (ref 96–108)
CO2 SERPL-SCNC: 26 MMOL/L (ref 21–32)
CREAT SERPL-MCNC: 0.64 MG/DL (ref 0.6–1.3)
EOSINOPHIL # BLD AUTO: 0.11 THOUSAND/ÂΜL (ref 0–0.61)
EOSINOPHIL NFR BLD AUTO: 2 % (ref 0–6)
ERYTHROCYTE [DISTWIDTH] IN BLOOD BY AUTOMATED COUNT: 12.7 % (ref 11.6–15.1)
GFR SERPL CREATININE-BSD FRML MDRD: 109 ML/MIN/1.73SQ M
GLUCOSE SERPL-MCNC: 100 MG/DL (ref 65–140)
HCT VFR BLD AUTO: 33.3 % (ref 34.8–46.1)
HGB BLD-MCNC: 10.8 G/DL (ref 11.5–15.4)
IMM GRANULOCYTES # BLD AUTO: 0.01 THOUSAND/UL (ref 0–0.2)
IMM GRANULOCYTES NFR BLD AUTO: 0 % (ref 0–2)
LIPASE SERPL-CCNC: 51 U/L (ref 11–82)
LYMPHOCYTES # BLD AUTO: 1.42 THOUSANDS/ÂΜL (ref 0.6–4.47)
LYMPHOCYTES NFR BLD AUTO: 28 % (ref 14–44)
MCH RBC QN AUTO: 30.4 PG (ref 26.8–34.3)
MCHC RBC AUTO-ENTMCNC: 32.4 G/DL (ref 31.4–37.4)
MCV RBC AUTO: 94 FL (ref 82–98)
MONOCYTES # BLD AUTO: 0.37 THOUSAND/ÂΜL (ref 0.17–1.22)
MONOCYTES NFR BLD AUTO: 7 % (ref 4–12)
NEUTROPHILS # BLD AUTO: 3.07 THOUSANDS/ÂΜL (ref 1.85–7.62)
NEUTS SEG NFR BLD AUTO: 62 % (ref 43–75)
NRBC BLD AUTO-RTO: 0 /100 WBCS
PLATELET # BLD AUTO: 224 THOUSANDS/UL (ref 149–390)
PMV BLD AUTO: 12 FL (ref 8.9–12.7)
POTASSIUM SERPL-SCNC: 3.6 MMOL/L (ref 3.5–5.3)
PROT SERPL-MCNC: 7.2 G/DL (ref 6.4–8.4)
RBC # BLD AUTO: 3.55 MILLION/UL (ref 3.81–5.12)
SODIUM SERPL-SCNC: 137 MMOL/L (ref 135–147)
WBC # BLD AUTO: 5.01 THOUSAND/UL (ref 4.31–10.16)

## 2024-01-29 PROCEDURE — 85025 COMPLETE CBC W/AUTO DIFF WBC: CPT

## 2024-01-29 PROCEDURE — 80076 HEPATIC FUNCTION PANEL: CPT

## 2024-01-29 PROCEDURE — 83690 ASSAY OF LIPASE: CPT

## 2024-01-29 PROCEDURE — 80048 BASIC METABOLIC PNL TOTAL CA: CPT

## 2024-01-29 PROCEDURE — 36415 COLL VENOUS BLD VENIPUNCTURE: CPT

## 2024-02-01 DIAGNOSIS — K50.00 CROHN'S DISEASE INVOLVING TERMINAL ILEUM (HCC): Primary | ICD-10-CM

## 2024-02-03 NOTE — LETTER
April 14, 2021     Dany Apodaca [address] [facility]    Patient: Susana Ryder   YOB: 1980   Date of Visit: 4/14/2021       Dear Dr Vel Garcia: Thank you for referring Susana Ryder to me for evaluation  Below are my notes for this consultation  If you have questions, please do not hesitate to call me  I look forward to following your patient along with you  Sincerely,        Fred Soliz MD        CC: No Recipients  Fred Soliz MD  4/14/2021  9:19 AM  Sign when Signing Visit  [address] [facility]  Susana Ryder 36 y o  female MRN: [id number]            Assessment & Plan:       IBD history:  44-year-old female, initially diagnosed with ileal Crohn's disease approximately 6 years ago, had been started on azathioprine at that time and has been maintained on that as she had been doing well in clinical and endoscopic remission  We stopped her azathioprine recently, kept her on mesalamine agents and she had a flare-up with severe small-bowel inflammatory changes and abdominal pain  Since that flare-up, patient has resumed Imuran, doing well on 50 mg daily  Tapered off prednisone  1  Ileal Crohn's disease:  History is noted above  -continue Imuran, 50 mg once daily   - patient is aware of small risk of hematologic dyscrasias, pancreatitis, hepatitis, lymphoma  -we will continue to monitor labs every 3 months   - we will check CRP, fecal calprotectin  -we will have the patient follow up in 6 months time, based on her clinical response at that time we can consider MR enterography or colonoscopy to evaluate for complete mucosal healing though clinically patient is doing quite well at this time    2  GERD: Continue pantoprazole daily, doing well, recommend EGD during future colonoscopy              _____________________________________________________________        CC: Follow-up    HPI:  Susana Ryder is a 36 y  o female who is here for Follow-up of ileal Crohn's disease  Patient completed her prednisone taper from a flare-up this past Saturday  She is back on her Imuran, 50 mg daily  She reports she is doing very well  Unfortunately she had retinal hemorrhage, requiring laser therapy, suspects  This was due to prednisone therapy  Overall patient is doing well  Her heartburn is well controlled on daily PPI therapy  Appetite is good, weight has been stable  Reports having regular, formed bowel movements, denies any melena, rectal bleeding, abdominal pain  No arthralgias, oral sores, ocular symptoms  Recent laboratory studies were stable  ROS:  The remainder of the ROS was negative except for the pertinent positives mentioned in HPI  Allergies: Patient has no known allergies      Medications:   Current Outpatient Medications:     azaTHIOprine (IMURAN) 50 mg tablet, Take 1 tablet (50 mg total) by mouth daily, Disp: 30 tablet, Rfl: 3    ciprofloxacin-dexamethasone (CIPRODEX) otic suspension, Administer 4 drops to the right ear 2 (two) times a day, Disp: 7 5 mL, Rfl: 0    dicyclomine (BENTYL) 10 mg capsule, Take 1 capsule (10 mg total) by mouth 3 (three) times a day as needed (abd pain), Disp: 90 capsule, Rfl: 3    hydrocortisone (ANUSOL-HC) 2 5 % rectal cream, Apply topically 2 (two) times a day Apply topically 2 times daily for 7-10 days, Disp: 28 g, Rfl: 0    hydrocortisone (ANUSOL-HC) 25 mg suppository, Insert 1 suppository (25 mg total) into the rectum 2 (two) times a day, Disp: 12 suppository, Rfl: 3    mesalamine (ASACOL) 800 MG EC tablet, Take 2 tablets (1,600 mg total) by mouth 2 (two) times a day, Disp: 120 tablet, Rfl: 3    Multiple Vitamins-Minerals (MULTIVITAMIN ADULT PO), Daily, Disp: , Rfl:     oxyCODONE (OXY-IR) 5 MG capsule, Take 1 capsule (5 mg total) by mouth every 6 (six) hours as needed for severe pain for up to 10 doses Can substitute oxycodone tablets for capsulesMax Daily Amount: 20 mg, Disp: 10 capsule, Rfl: 0    pantoprazole (PROTONIX) 40 mg tablet, Take 1 tablet (40 mg total) by mouth daily, Disp: 30 tablet, Rfl: 3    Probiotic Product (PROBIOTIC-10 PO), Probiotic  1 tab daily, Disp: , Rfl:     Tranexamic Acid 650 MG TABS, Take 2 tablets (1,300 mg total) by mouth 3 (three) times a day, Disp: 5 tablet, Rfl: 4    mesalamine (PENTASA) 500 mg CR capsule, Take 2 capsules (1,000 mg total) by mouth 3 (three) times a day, Disp: 180 capsule, Rfl: 3    Past Medical History:   Diagnosis Date    Anxiety     [facility] [address]     [facility]     Ear infection        Past Surgical History:   Procedure Laterality Date     SECTION      COLONOSCOPY      UPPER GASTROINTESTINAL ENDOSCOPY         Family History   Problem Relation Age of Onset    Obesity Mother     Hypertension Mother     Obesity Father     Hypertension Father     Colon cancer Neg Hx     Liver disease Neg Hx         reports that she has never smoked  She has never used smokeless tobacco  She reports current alcohol use of about 2 0 standard drinks of alcohol per week  She reports that she does not use drugs        Physical Exam:    /82 (BP Location: Left arm, Patient Position: Sitting, Cuff Size: Standard)   Ht 4' 11" (1 499 m)   Wt 53 4 kg (117 lb 12 8 oz)   BMI 23 79 kg/m²     Gen: wn/wd, NAD , healthy appearing female  HEENT: anicteric, MMM, no cervical LAD  CVS: RRR, no m/r/g  CHEST: CTA b/l  ABD: +BS, soft, NT,ND, no hepatosplenomegaly  EXT: no c/c/e  NEURO: aaox3  SKIN: NO rashes difficulty breathing

## 2024-02-21 LAB
APOB+LDLR+PCSK9 GENE MUT ANL BLD/T: NOT DETECTED
BRCA1+BRCA2 DEL+DUP + FULL MUT ANL BLD/T: NOT DETECTED
MLH1+MSH2+MSH6+PMS2 GN DEL+DUP+FUL M: NOT DETECTED

## 2024-03-06 PROCEDURE — 87077 CULTURE AEROBIC IDENTIFY: CPT | Performed by: OTOLARYNGOLOGY

## 2024-03-06 PROCEDURE — 87205 SMEAR GRAM STAIN: CPT | Performed by: OTOLARYNGOLOGY

## 2024-03-06 PROCEDURE — 87070 CULTURE OTHR SPECIMN AEROBIC: CPT | Performed by: OTOLARYNGOLOGY

## 2024-03-06 PROCEDURE — 87186 SC STD MICRODIL/AGAR DIL: CPT | Performed by: OTOLARYNGOLOGY

## 2024-03-06 PROCEDURE — 87147 CULTURE TYPE IMMUNOLOGIC: CPT | Performed by: OTOLARYNGOLOGY

## 2024-03-06 PROCEDURE — 87102 FUNGUS ISOLATION CULTURE: CPT | Performed by: OTOLARYNGOLOGY

## 2024-05-07 ENCOUNTER — TELEPHONE (OUTPATIENT)
Dept: GASTROENTEROLOGY | Facility: CLINIC | Age: 44
End: 2024-05-07

## 2024-05-07 NOTE — TELEPHONE ENCOUNTER
Pt is due for a follow up with Dr Curtis, I called the patient and scheduled her for 6/24 @ [facility]

## 2024-06-01 ENCOUNTER — APPOINTMENT (OUTPATIENT)
Dept: LAB | Facility: HOSPITAL | Age: 44
End: 2024-06-01
Payer: COMMERCIAL

## 2024-06-01 DIAGNOSIS — K50.00 CROHN'S DISEASE INVOLVING TERMINAL ILEUM (HCC): ICD-10-CM

## 2024-06-01 DIAGNOSIS — Z00.8 HEALTH EXAMINATION IN POPULATION SURVEY: ICD-10-CM

## 2024-06-01 LAB
ALBUMIN SERPL BCP-MCNC: 4.4 G/DL (ref 3.5–5)
ALP SERPL-CCNC: 72 U/L (ref 34–104)
ALT SERPL W P-5'-P-CCNC: 32 U/L (ref 7–52)
ANION GAP SERPL CALCULATED.3IONS-SCNC: 4 MMOL/L (ref 4–13)
AST SERPL W P-5'-P-CCNC: 28 U/L (ref 13–39)
BASOPHILS # BLD AUTO: 0.02 THOUSANDS/ÂΜL (ref 0–0.1)
BASOPHILS NFR BLD AUTO: 0 % (ref 0–1)
BILIRUB DIRECT SERPL-MCNC: 0.15 MG/DL (ref 0–0.2)
BILIRUB SERPL-MCNC: 0.6 MG/DL (ref 0.2–1)
BUN SERPL-MCNC: 9 MG/DL (ref 5–25)
CALCIUM SERPL-MCNC: 9.6 MG/DL (ref 8.4–10.2)
CHLORIDE SERPL-SCNC: 108 MMOL/L (ref 96–108)
CHOLEST SERPL-MCNC: 182 MG/DL
CO2 SERPL-SCNC: 29 MMOL/L (ref 21–32)
CREAT SERPL-MCNC: 0.61 MG/DL (ref 0.6–1.3)
EOSINOPHIL # BLD AUTO: 0.09 THOUSAND/ÂΜL (ref 0–0.61)
EOSINOPHIL NFR BLD AUTO: 2 % (ref 0–6)
ERYTHROCYTE [DISTWIDTH] IN BLOOD BY AUTOMATED COUNT: 11.9 % (ref 11.6–15.1)
EST. AVERAGE GLUCOSE BLD GHB EST-MCNC: 111 MG/DL
GFR SERPL CREATININE-BSD FRML MDRD: 111 ML/MIN/1.73SQ M
GLUCOSE SERPL-MCNC: 95 MG/DL (ref 65–140)
HBA1C MFR BLD: 5.5 %
HCT VFR BLD AUTO: 34.8 % (ref 34.8–46.1)
HDLC SERPL-MCNC: 69 MG/DL
HGB BLD-MCNC: 11.2 G/DL (ref 11.5–15.4)
IMM GRANULOCYTES # BLD AUTO: 0 THOUSAND/UL (ref 0–0.2)
IMM GRANULOCYTES NFR BLD AUTO: 0 % (ref 0–2)
LDLC SERPL CALC-MCNC: 97 MG/DL (ref 0–100)
LYMPHOCYTES # BLD AUTO: 1.59 THOUSANDS/ÂΜL (ref 0.6–4.47)
LYMPHOCYTES NFR BLD AUTO: 32 % (ref 14–44)
MCH RBC QN AUTO: 30.2 PG (ref 26.8–34.3)
MCHC RBC AUTO-ENTMCNC: 32.2 G/DL (ref 31.4–37.4)
MCV RBC AUTO: 94 FL (ref 82–98)
MONOCYTES # BLD AUTO: 0.27 THOUSAND/ÂΜL (ref 0.17–1.22)
MONOCYTES NFR BLD AUTO: 5 % (ref 4–12)
NEUTROPHILS # BLD AUTO: 3.07 THOUSANDS/ÂΜL (ref 1.85–7.62)
NEUTS SEG NFR BLD AUTO: 61 % (ref 43–75)
NONHDLC SERPL-MCNC: 113 MG/DL
NRBC BLD AUTO-RTO: 0 /100 WBCS
PLATELET # BLD AUTO: 287 THOUSANDS/UL (ref 149–390)
PMV BLD AUTO: 11 FL (ref 8.9–12.7)
POTASSIUM SERPL-SCNC: 4.5 MMOL/L (ref 3.5–5.3)
PROT SERPL-MCNC: 7.4 G/DL (ref 6.4–8.4)
RBC # BLD AUTO: 3.71 MILLION/UL (ref 3.81–5.12)
SODIUM SERPL-SCNC: 141 MMOL/L (ref 135–147)
TRIGL SERPL-MCNC: 79 MG/DL
WBC # BLD AUTO: 5.04 THOUSAND/UL (ref 4.31–10.16)

## 2024-06-01 PROCEDURE — 80076 HEPATIC FUNCTION PANEL: CPT

## 2024-06-01 PROCEDURE — 80048 BASIC METABOLIC PNL TOTAL CA: CPT

## 2024-06-01 PROCEDURE — 36415 COLL VENOUS BLD VENIPUNCTURE: CPT

## 2024-06-01 PROCEDURE — 85025 COMPLETE CBC W/AUTO DIFF WBC: CPT

## 2024-06-01 PROCEDURE — 83036 HEMOGLOBIN GLYCOSYLATED A1C: CPT

## 2024-06-01 PROCEDURE — 80061 LIPID PANEL: CPT

## 2024-06-18 DIAGNOSIS — K50.00 CROHN'S DISEASE INVOLVING TERMINAL ILEUM (HCC): ICD-10-CM

## 2024-06-19 RX ORDER — AZATHIOPRINE 50 MG/1
50 TABLET ORAL DAILY
Qty: 90 TABLET | Refills: 0 | Status: SHIPPED | OUTPATIENT
Start: 2024-06-19 | End: 2024-06-24 | Stop reason: SDUPTHER

## 2024-06-24 ENCOUNTER — OFFICE VISIT (OUTPATIENT)
Dept: GASTROENTEROLOGY | Facility: AMBULARY SURGERY CENTER | Age: 44
End: 2024-06-24
Payer: COMMERCIAL

## 2024-06-24 VITALS
WEIGHT: 120.8 LBS | BODY MASS INDEX: 23.71 KG/M2 | DIASTOLIC BLOOD PRESSURE: 74 MMHG | OXYGEN SATURATION: 99 % | SYSTOLIC BLOOD PRESSURE: 118 MMHG | HEIGHT: 60 IN | HEART RATE: 74 BPM

## 2024-06-24 DIAGNOSIS — K21.9 GASTROESOPHAGEAL REFLUX DISEASE WITHOUT ESOPHAGITIS: ICD-10-CM

## 2024-06-24 DIAGNOSIS — K64.8 INTERNAL HEMORRHOIDS WITH COMPLICATION: ICD-10-CM

## 2024-06-24 DIAGNOSIS — K50.00 CROHN'S DISEASE INVOLVING TERMINAL ILEUM (HCC): Primary | ICD-10-CM

## 2024-06-24 PROCEDURE — 99214 OFFICE O/P EST MOD 30 MIN: CPT | Performed by: INTERNAL MEDICINE

## 2024-06-24 RX ORDER — AZATHIOPRINE 50 MG/1
50 TABLET ORAL DAILY
Qty: 90 TABLET | Refills: 2 | Status: SHIPPED | OUTPATIENT
Start: 2024-06-24

## 2024-06-24 NOTE — PROGRESS NOTES
[facility]  Jocy Danielle 44 y.o. female MRN: [id number]            Assessment & Plan:  Nimisha 44-year-old female with a longstanding history of ileal Crohn's disease, history of GERD with atypical chest pain.    1.  Ileal Crohn's disease:  -Endoscopically and clinically in remission, continue current medications  -Case patient is on azathioprine 50 mg daily  -Will check laboratory studies every 3 months  -Follow-up in 6 months  -Continue routine health maintenance    2.  GERD: Well-controlled, no longer having any atypical symptoms  -No need for medication at this time, resume PPI therapy if it recurs    3.:  Hemorrhoidal bleeding  -Occasional intermittent hemorrhoidal bleeding which is hard stools    Follow-up in 6      Jocy was seen today for follow-up.    Diagnoses and all orders for this visit:    Crohn's disease involving terminal ileum (HCC)  -     azaTHIOprine (IMURAN) 50 mg tablet; Take 1 tablet (50 mg total) by mouth daily  -     Basic metabolic panel; Standing  -     CBC and differential; Standing  -     Hepatic function panel; Standing  -     Lipase; Standing    Gastroesophageal reflux disease without esophagitis    Internal hemorrhoids with complication            _____________________________________________________________        CC: Follow-up follow-up for this is a nimisha 44-year-old female with a history of    HPI:  Jocy Danielle is a 44 y.o.female who is here for mild ileal Crohn's, GERD.  Patient reports that she is doing very well, has regular bowel moods, in fact tends towards constipation, she does take iron supplementation for heavy menstrual bleeding.  She denies any abdominal pain, nausea, vomiting.  She has not had any episodes of recurrent chest pain or reflux symptoms which she has had in the past.  Denies any dysphagia.  Appetite is good, reports very mild arthralgias mostly in her knees, denies any ocular symptoms, up-to-date on vaccinations.    She  was tried on Humira in the past but had adverse effects.    Patient is tolerating of azathioprine which she has been on for many years now with stability of her Crohn's and endoscopic remission.    Patient recently started working in the [employer].      ROS:  The remainder of the ROS was negative except for the pertinent positives mentioned in HPI.      Allergies: Epinephrine    Medications:   Current Outpatient Medications:     Ascorbic Acid (vitamin C) 1000 MG tablet, Take 1,000 mg by mouth daily, Disp: , Rfl:     azaTHIOprine (IMURAN) 50 mg tablet, Take 1 tablet (50 mg total) by mouth daily, Disp: 90 tablet, Rfl: 2    cholecalciferol (VITAMIN D3) 1,000 units tablet, Take 1,000 Units by mouth daily, Disp: , Rfl:     ciprofloxacin-dexamethasone (CIPRODEX) otic suspension, Administer 4 drops into both ears 2 (two) times a day (Patient taking differently: Administer 4 drops into both ears 2 (two) times a day As  needed), Disp: 7.5 mL, Rfl: 2    dicyclomine (BENTYL) 20 mg tablet, Take 1 tablet (20 mg total) by mouth 2 (two) times a day as needed (abdominal pain), Disp: 30 tablet, Rfl: 2    escitalopram (LEXAPRO) 10 mg tablet, Take 1 tablet (10 mg total) by mouth daily, Disp: 90 tablet, Rfl: 2    hydrocortisone (ANUSOL-HC) 2.5 % rectal cream, Apply topically 2 (two) times a day Apply topically 2 times daily for 7-10 days, Disp: 28 g, Rfl: 0    hydrocortisone (ANUSOL-HC) 25 mg suppository, Insert 1 suppository (25 mg total) into the rectum 2 (two) times a day, Disp: 30 suppository, Rfl: 3    Multiple Vitamins-Minerals (MULTIVITAMIN ADULT PO), Daily, Disp: , Rfl:     PREBIOTIC PRODUCT PO, Take by mouth, Disp: , Rfl:     Probiotic Product (PROBIOTIC-10 PO), Probiotic  1 tab daily, Disp: , Rfl:     ciprofloxacin-dexamethasone (CIPRODEX) otic suspension, Administer 4 drops into both ears 2 (two) times a day (Patient not taking: Reported on 6/24/2024), Disp: 7.5 mL, Rfl: 0    pantoprazole (PROTONIX) 40 mg tablet, Take 1  tablet (40 mg total) by mouth daily (Patient not taking: Reported on 2024), Disp: 30 tablet, Rfl: 3    sodium chloride (OCEAN) 0.65 % nasal spray, 1 spray into each nostril as needed for congestion (Patient not taking: Reported on 2024), Disp: 30 mL, Rfl: 0    Tranexamic Acid 650 MG TABS, Take 2 tablets (1,300 mg total) by mouth 3 (three) times a day (Patient not taking: Reported on 2024), Disp: 5 tablet, Rfl: 4    Past Medical History:   Diagnosis Date    Anxiety     Autoimmune disorder (HCC)     COVID 2022    fever    Crohn disease (HCC)     Ear infection        Past Surgical History:   Procedure Laterality Date     SECTION      COLONOSCOPY      FACIAL SCAR REVISION Right 2022    Procedure: SCAR REVISION OF RIGHT FOREHEAD AND RIGHT NASOLABIAL FOLD;  Surgeon: Brian Sofia MD;  [facility];  Service: Plastics    OR ADJT/REARGMT F/C/C/M/N/AX/G/H/F 10.1-30.0 SQ CM Right 2022    Procedure: COMPLEX CLOSURE VERSUS ZPLASTY;  Surgeon: Brian Sofia MD;  [facility];  Service: Plastics    UPPER GASTROINTESTINAL ENDOSCOPY         Family History   Problem Relation Age of Onset    Obesity Mother     Hypertension Mother     Obesity Father     Hypertension Father     Colon cancer Neg Hx     Liver disease Neg Hx     Breast cancer Neg Hx         reports that she has never smoked. She has never used smokeless tobacco. She reports current alcohol use of about 2.0 standard drinks of alcohol per week. She reports that she does not use drugs.      Physical Exam:    /74 (BP Location: Left arm, Patient Position: Sitting, Cuff Size: Standard)   Pulse 74   Ht 5' (1.524 m)   Wt 54.8 kg (120 lb 12.8 oz)   SpO2 99%   BMI 23.59 kg/m²     Gen: wn/wd, NAD, healthy-appearing female  HEENT: anicteric, MMM, no cervical LAD  CVS: RRR, no m/r/g  CHEST: CTA b/l  ABD: +BS, soft, NT,ND, no hepatosplenomegaly  EXT: no c/c/e  NEURO: aaox3  SKIN: NO rashes

## 2024-07-29 ENCOUNTER — HOSPITAL ENCOUNTER (OUTPATIENT)
Dept: RADIOLOGY | Facility: MEDICAL CENTER | Age: 44
Discharge: HOME | End: 2024-07-29
Payer: COMMERCIAL

## 2024-07-29 VITALS — WEIGHT: 120 LBS | HEIGHT: 60 IN | BODY MASS INDEX: 23.56 KG/M2

## 2024-07-29 DIAGNOSIS — Z12.31 SCREENING MAMMOGRAM FOR BREAST CANCER: ICD-10-CM

## 2024-07-29 PROCEDURE — 77063 BREAST TOMOSYNTHESIS BI: CPT

## 2024-07-29 PROCEDURE — 77067 SCR MAMMO BI INCL CAD: CPT

## 2024-10-04 DIAGNOSIS — K50.00 CROHN'S DISEASE INVOLVING TERMINAL ILEUM (HCC): ICD-10-CM

## 2024-10-07 RX ORDER — AZATHIOPRINE 50 MG/1
50 TABLET ORAL DAILY
Qty: 90 TABLET | Refills: 1 | Status: SHIPPED | OUTPATIENT
Start: 2024-10-07

## 2024-12-30 ENCOUNTER — OFFICE VISIT (OUTPATIENT)
Dept: URGENT CARE | Facility: MEDICAL CENTER | Age: 44
End: 2024-12-30
Payer: COMMERCIAL

## 2024-12-30 VITALS
SYSTOLIC BLOOD PRESSURE: 127 MMHG | RESPIRATION RATE: 18 BRPM | OXYGEN SATURATION: 98 % | HEART RATE: 75 BPM | DIASTOLIC BLOOD PRESSURE: 64 MMHG | TEMPERATURE: 98.9 F

## 2024-12-30 DIAGNOSIS — N30.01 ACUTE CYSTITIS WITH HEMATURIA: ICD-10-CM

## 2024-12-30 DIAGNOSIS — R30.0 DYSURIA: Primary | ICD-10-CM

## 2024-12-30 LAB
SL AMB  POCT GLUCOSE, UA: ABNORMAL
SL AMB LEUKOCYTE ESTERASE,UA: ABNORMAL
SL AMB POCT BILIRUBIN,UA: ABNORMAL
SL AMB POCT BLOOD,UA: ABNORMAL
SL AMB POCT CLARITY,UA: ABNORMAL
SL AMB POCT COLOR,UA: YELLOW
SL AMB POCT KETONES,UA: ABNORMAL
SL AMB POCT NITRITE,UA: ABNORMAL
SL AMB POCT PH,UA: 8
SL AMB POCT SPECIFIC GRAVITY,UA: 1
SL AMB POCT URINE PROTEIN: ABNORMAL
SL AMB POCT UROBILINOGEN: 0.2

## 2024-12-30 PROCEDURE — 81002 URINALYSIS NONAUTO W/O SCOPE: CPT | Performed by: FAMILY MEDICINE

## 2024-12-30 PROCEDURE — 87086 URINE CULTURE/COLONY COUNT: CPT | Performed by: FAMILY MEDICINE

## 2024-12-30 PROCEDURE — 99213 OFFICE O/P EST LOW 20 MIN: CPT | Performed by: FAMILY MEDICINE

## 2024-12-30 RX ORDER — NITROFURANTOIN 25; 75 MG/1; MG/1
100 CAPSULE ORAL 2 TIMES DAILY
Qty: 10 CAPSULE | Refills: 0 | Status: SHIPPED | OUTPATIENT
Start: 2024-12-30 | End: 2024-12-30

## 2024-12-30 RX ORDER — ONDANSETRON 4 MG/1
TABLET, ORALLY DISINTEGRATING ORAL
COMMUNITY
Start: 2024-11-04

## 2024-12-30 RX ORDER — NITROFURANTOIN 25; 75 MG/1; MG/1
100 CAPSULE ORAL 2 TIMES DAILY
Qty: 10 CAPSULE | Refills: 0 | Status: SHIPPED | OUTPATIENT
Start: 2024-12-30 | End: 2025-01-04

## 2024-12-30 RX ORDER — MECLIZINE HYDROCHLORIDE 25 MG/1
TABLET ORAL
COMMUNITY
Start: 2024-11-04

## 2024-12-30 NOTE — PROGRESS NOTES
[facility]        NAME: Jocy Danielle is a 44 y.o. female  : 1980    MRN: [id number]  DATE: 2024  TIME: 9:00 AM    Assessment and Plan   Dysuria [R30.0]  1. Dysuria  POCT urine dip    Urine culture    Urine culture      2. Acute cystitis with hematuria  nitrofurantoin (MACROBID) 100 mg capsule        UTI based on UA  Treated with antibiotics as above.  Will await C/S and change medication if needed.  Supportive care measures discussed  ED precautions discussed.     Patient Instructions       Follow up with PCP in 3-5 days.  Proceed to  ER if symptoms worsen.    If tests have been performed at [facility], our office will contact you with results if changes need to be made to the care plan discussed with you at the visit.  [facility]    Chief Complaint     Chief Complaint   Patient presents with   • Female Dysuria     Patient states starting today she has urinary pressure, frequency, urgency         History of Present Illness       Urinary Tract Infection   This is a new problem. The current episode started today. The problem has been unchanged. The quality of the pain is described as burning. The pain is moderate. There has been no fever. Associated symptoms include frequency and urgency. Pertinent negatives include no chills, discharge, flank pain, hematuria, hesitancy, nausea, possible pregnancy, sweats or vomiting. There is no history of catheterization, kidney stones, recurrent UTIs, a single kidney, urinary stasis or a urological procedure.       Review of Systems   Review of Systems   Constitutional:  Negative for chills.   Gastrointestinal:  Negative for nausea and vomiting.   Genitourinary:  Positive for frequency and urgency. Negative for flank pain, hematuria and hesitancy.         Current Medications       Current Outpatient Medications:   •  meclizine (ANTIVERT) 25 mg tablet, , Disp: , Rfl:   •  nitrofurantoin (MACROBID) 100 mg  capsule, Take 1 capsule (100 mg total) by mouth 2 (two) times a day for 5 days, Disp: 10 capsule, Rfl: 0  •  ondansetron (ZOFRAN-ODT) 4 mg disintegrating tablet, , Disp: , Rfl:   •  Ascorbic Acid (vitamin C) 1000 MG tablet, Take 1,000 mg by mouth daily, Disp: , Rfl:   •  azaTHIOprine (IMURAN) 50 mg tablet, Take 1 tablet (50 mg total) by mouth daily, Disp: 90 tablet, Rfl: 1  •  cholecalciferol (VITAMIN D3) 1,000 units tablet, Take 1,000 Units by mouth daily, Disp: , Rfl:   •  ciprofloxacin-dexamethasone (CIPRODEX) otic suspension, Administer 4 drops into both ears 2 (two) times a day (Patient taking differently: Administer 4 drops into both ears 2 (two) times a day As  needed), Disp: 7.5 mL, Rfl: 2  •  ciprofloxacin-dexamethasone (CIPRODEX) otic suspension, Administer 4 drops into both ears 2 (two) times a day (Patient not taking: Reported on 6/24/2024), Disp: 7.5 mL, Rfl: 0  •  dicyclomine (BENTYL) 20 mg tablet, Take 1 tablet (20 mg total) by mouth 2 (two) times a day as needed (abdominal pain), Disp: 30 tablet, Rfl: 2  •  escitalopram (LEXAPRO) 10 mg tablet, Take 1 tablet (10 mg total) by mouth daily, Disp: 90 tablet, Rfl: 2  •  hydrocortisone (ANUSOL-HC) 2.5 % rectal cream, Apply topically 2 (two) times a day Apply topically 2 times daily for 7-10 days, Disp: 28 g, Rfl: 0  •  hydrocortisone (ANUSOL-HC) 25 mg suppository, Insert 1 suppository (25 mg total) into the rectum 2 (two) times a day, Disp: 30 suppository, Rfl: 3  •  Multiple Vitamins-Minerals (MULTIVITAMIN ADULT PO), Daily, Disp: , Rfl:   •  pantoprazole (PROTONIX) 40 mg tablet, Take 1 tablet (40 mg total) by mouth daily (Patient not taking: Reported on 6/24/2024), Disp: 30 tablet, Rfl: 3  •  PREBIOTIC PRODUCT PO, Take by mouth, Disp: , Rfl:   •  Probiotic Product (PROBIOTIC-10 PO), Probiotic  1 tab daily, Disp: , Rfl:   •  sodium chloride (OCEAN) 0.65 % nasal spray, 1 spray into each nostril as needed for congestion (Patient not taking: Reported on  2024), Disp: 30 mL, Rfl: 0  •  Tranexamic Acid 650 MG TABS, Take 2 tablets (1,300 mg total) by mouth 3 (three) times a day (Patient not taking: Reported on 2024), Disp: 5 tablet, Rfl: 4    Current Allergies     Allergies as of 2024 - Reviewed 2024   Allergen Reaction Noted   • Epinephrine Other (See Comments) 2022            The following portions of the patient's history were reviewed and updated as appropriate: allergies, current medications, past family history, past medical history, past social history, past surgical history and problem list.     Past Medical History:   Diagnosis Date   • Anxiety    • Autoimmune disorder (HCC)    • BRCA1 negative    • COVID 2022    fever   • Crohn disease (HCC)    • Ear infection    • Ear problems        Past Surgical History:   Procedure Laterality Date   •  SECTION     • COLONOSCOPY     • FACIAL SCAR REVISION Right 2022    Procedure: SCAR REVISION OF RIGHT FOREHEAD AND RIGHT NASOLABIAL FOLD;  Surgeon: Brian Sofia MD;  Location: [facility];  Service: Plastics   • NH ADJT/REARGMT F/C/C/M/N/AX/G/H/F 10.1-30.0 SQ CM Right 2022    Procedure: COMPLEX CLOSURE VERSUS ZPLASTY;  Surgeon: Brian Sofia MD;  Location: [facility];  Service: Plastics   • UPPER GASTROINTESTINAL ENDOSCOPY         Family History   Problem Relation Age of Onset   • Obesity Mother    • Hypertension Mother    • Obesity Father    • Hypertension Father    • No Known Problems Sister    • No Known Problems Sister    • No Known Problems Daughter    • No Known Problems Maternal Grandmother    • No Known Problems Maternal Grandfather    • No Known Problems Paternal Grandmother    • No Known Problems Paternal Grandfather    • No Known Problems Son    • Colon cancer Neg Hx    • Liver disease Neg Hx    • Breast cancer Neg Hx          Medications have been verified.        Objective   /64   Pulse 75   Temp 98.9 °F (37.2 °C) (Temporal)   Resp 18   SpO2 98%    No LMP recorded.       Physical Exam     Physical Exam  Vitals reviewed.   Constitutional:       General: She is not in acute distress.     Appearance: Normal appearance. She is not ill-appearing, toxic-appearing or diaphoretic.   HENT:      Head: Normocephalic and atraumatic.      Right Ear: Tympanic membrane normal.      Left Ear: Tympanic membrane normal.      Nose: Nose normal.      Mouth/Throat:      Mouth: Mucous membranes are moist.      Pharynx: No oropharyngeal exudate or posterior oropharyngeal erythema.   Eyes:      Pupils: Pupils are equal, round, and reactive to light.   Cardiovascular:      Rate and Rhythm: Normal rate and regular rhythm.      Heart sounds: No murmur heard.  Pulmonary:      Effort: Pulmonary effort is normal. No respiratory distress.      Breath sounds: Normal breath sounds.   Abdominal:      General: Abdomen is flat.      Palpations: Abdomen is soft.      Tenderness: There is abdominal tenderness in the suprapubic area. There is no right CVA tenderness or left CVA tenderness.   Musculoskeletal:         General: Normal range of motion.      Cervical back: Normal range of motion. No rigidity or tenderness.   Lymphadenopathy:      Cervical: No cervical adenopathy.   Skin:     General: Skin is warm and dry.      Capillary Refill: Capillary refill takes less than 2 seconds.   Neurological:      General: No focal deficit present.      Mental Status: She is alert and oriented to person, place, and time. Mental status is at baseline.   Psychiatric:         Mood and Affect: Mood normal.         Behavior: Behavior normal.         Thought Content: Thought content normal.

## 2024-12-30 NOTE — ADDENDUM NOTE
Addended by: BECKY SANDERS on: 12/30/2024 09:25 AM     Modules accepted: Orders     How Severe Is Your Rash?: moderate Is This A New Presentation, Or A Follow-Up?: Rash Additional History: She thinks the rash started after she got bitten in the summer. She is concerned about mites or flea bites.

## 2025-01-01 LAB — BACTERIA UR CULT: NORMAL

## 2025-01-02 ENCOUNTER — RESULTS FOLLOW-UP (OUTPATIENT)
Dept: URGENT CARE | Facility: MEDICAL CENTER | Age: 45
End: 2025-01-02

## 2025-01-27 ENCOUNTER — TELEPHONE (OUTPATIENT)
Age: 45
End: 2025-01-27

## 2025-02-04 DIAGNOSIS — K50.00 CROHN'S DISEASE INVOLVING TERMINAL ILEUM (HCC): ICD-10-CM

## 2025-02-05 RX ORDER — AZATHIOPRINE 50 MG/1
50 TABLET ORAL DAILY
Qty: 90 TABLET | Refills: 0 | Status: SHIPPED | OUTPATIENT
Start: 2025-02-05

## 2025-03-12 ENCOUNTER — ANNUAL EXAM (OUTPATIENT)
Dept: OBGYN CLINIC | Facility: CLINIC | Age: 45
End: 2025-03-12

## 2025-03-12 VITALS
RESPIRATION RATE: 18 BRPM | SYSTOLIC BLOOD PRESSURE: 120 MMHG | HEART RATE: 84 BPM | BODY MASS INDEX: 24.48 KG/M2 | WEIGHT: 121.4 LBS | HEIGHT: 59 IN | DIASTOLIC BLOOD PRESSURE: 79 MMHG

## 2025-03-12 DIAGNOSIS — Z12.31 ENCOUNTER FOR SCREENING MAMMOGRAM FOR MALIGNANT NEOPLASM OF BREAST: Primary | ICD-10-CM

## 2025-03-12 DIAGNOSIS — Z01.419 ENCOUNTER FOR ANNUAL ROUTINE GYNECOLOGICAL EXAMINATION: ICD-10-CM

## 2025-03-12 PROCEDURE — S0612 ANNUAL GYNECOLOGICAL EXAMINA: HCPCS | Performed by: OBSTETRICS & GYNECOLOGY

## 2025-03-12 NOTE — PROGRESS NOTES
"Name: Jocy Danielle      : 1980      MRN: [id number]  Encounter Provider: Pipe Soares MD  Encounter Date: 3/12/2025   Encounter department: [facility]  :  Assessment & Plan            Normal breast and GYN exam  Normal menstrual cycles up until last cycle which was only spotting for 5 days.  Issue of sterilization   Normal colonoscopy   Normal mammogram 2024  Normal Pap smear   Crohn's disease    Plan: Rx mammogram.  Recommend healthy diet and exercise.  Observe menstrual cycles over the next few months and call with update.  Recommend daily Kegels.    Jocy Danielle is a 44 y.o. female who presents for annual exam complaining of some irritability with menstrual cycles.  Started taking over-the-counter herbal supplements with some relief.  Also had regular menstrual cycles up until last month when she noticed just light spotting for 5 days.  Denies any pelvic pain.  Sexually active and denies any dyspareunia.  Denies any breast bowel or bladder issues.  Her Crohn's disease has been stable.  Medications reviewed.  Eating healthy and exercising regularly.       Objective   /79 (BP Location: Left arm, Patient Position: Sitting, Cuff Size: Adult)   Pulse 84   Resp 18   Ht 4' 10.75\" (1.492 m)   Wt 55.1 kg (121 lb 6.4 oz)   LMP 2025 (Exact Date)   BMI 24.73 kg/m²      Physical Exam  Vitals and nursing note reviewed.   Constitutional:       General: She is not in acute distress.     Appearance: She is well-developed.   HENT:      Head: Normocephalic and atraumatic.   Eyes:      Conjunctiva/sclera: Conjunctivae normal.   Cardiovascular:      Rate and Rhythm: Normal rate and regular rhythm.      Heart sounds: No murmur heard.  Pulmonary:      Effort: Pulmonary effort is normal. No respiratory distress.      Breath sounds: Normal breath sounds.   Chest:   Breasts:     Right: Normal.      Left: Normal.   Abdominal:      Palpations: Abdomen " is soft.      Tenderness: There is no abdominal tenderness.   Genitourinary:     Vagina: Normal.      Cervix: Normal.      Uterus: Normal.       Adnexa: Right adnexa normal and left adnexa normal.      Comments: None genitalia normal.  No uterine prolapse cystocele or rectocele.  Musculoskeletal:         General: No swelling.      Cervical back: Neck supple.   Skin:     General: Skin is warm and dry.      Capillary Refill: Capillary refill takes less than 2 seconds.   Neurological:      Mental Status: She is alert.   Psychiatric:         Mood and Affect: Mood normal.

## 2025-03-27 ENCOUNTER — OFFICE VISIT (OUTPATIENT)
Dept: FAMILY MEDICINE CLINIC | Facility: CLINIC | Age: 45
End: 2025-03-27
Payer: COMMERCIAL

## 2025-03-27 VITALS
WEIGHT: 122 LBS | SYSTOLIC BLOOD PRESSURE: 126 MMHG | BODY MASS INDEX: 24.6 KG/M2 | HEART RATE: 76 BPM | OXYGEN SATURATION: 98 % | TEMPERATURE: 98.3 F | DIASTOLIC BLOOD PRESSURE: 82 MMHG | HEIGHT: 59 IN

## 2025-03-27 DIAGNOSIS — Z76.89 ENCOUNTER TO ESTABLISH CARE: ICD-10-CM

## 2025-03-27 DIAGNOSIS — E55.9 VITAMIN D DEFICIENCY: ICD-10-CM

## 2025-03-27 DIAGNOSIS — Z00.00 ANNUAL PHYSICAL EXAM: Primary | ICD-10-CM

## 2025-03-27 DIAGNOSIS — K50.00 CROHN'S DISEASE INVOLVING TERMINAL ILEUM (HCC): ICD-10-CM

## 2025-03-27 DIAGNOSIS — R53.82 CHRONIC FATIGUE: ICD-10-CM

## 2025-03-27 PROCEDURE — 99396 PREV VISIT EST AGE 40-64: CPT | Performed by: NURSE PRACTITIONER

## 2025-03-27 NOTE — ASSESSMENT & PLAN NOTE
Patient is followed by Dr Curtis and has frequent labs through his office    Patient instructed to return in 1 year for next follow-up/exam - sooner if needed

## 2025-03-27 NOTE — PATIENT INSTRUCTIONS
"Patient Education     Routine physical for adults   The Basics   Written by the doctors and editors at [facility]   What is a physical? -- A physical is a routine visit, or \"check-up,\" with your doctor. You might also hear it called a \"wellness visit\" or \"preventive visit.\"  During each visit, the doctor will:   Ask about your physical and mental health   Ask about your habits, behaviors, and lifestyle   Do an exam   Give you vaccines if needed   Talk to you about any medicines you take   Give advice about your health   Answer your questions  Getting regular check-ups is an important part of taking care of your health. It can help your doctor find and treat any problems you have. But it's also important for preventing health problems.  A routine physical is different from a \"sick visit.\" A sick visit is when you see a doctor because of a health concern or problem. Since physicals are scheduled ahead of time, you can think about what you want to ask the doctor.  How often should I get a physical? -- It depends on your age and health. In general, for people age 21 years and older:   If you are younger than 50 years, you might be able to get a physical every 3 years.   If you are 50 years or older, your doctor might recommend a physical every year.  If you have an ongoing health condition, like diabetes or high blood pressure, your doctor will probably want to see you more often.  What happens during a physical? -- In general, each visit will include:   Physical exam - The doctor or nurse will check your height, weight, heart rate, and blood pressure. They will also look at your eyes and ears. They will ask about how you are feeling and whether you have any symptoms that bother you.   Medicines - It's a good idea to bring a list of all the medicines you take to each doctor visit. Your doctor will talk to you about your medicines and answer any questions. Tell them if you are having any side effects that bother you. You " "should also tell them if you are having trouble paying for any of your medicines.   Habits and behaviors - This includes:   Your diet   Your exercise habits   Whether you smoke, drink alcohol, or use drugs   Whether you are sexually active   Whether you feel safe at home  Your doctor will talk to you about things you can do to improve your health and lower your risk of health problems. They will also offer help and support. For example, if you want to quit smoking, they can give you advice and might prescribe medicines. If you want to improve your diet or get more physical activity, they can help you with this, too.   Lab tests, if needed - The tests you get will depend on your age and situation. For example, your doctor might want to check your:   Cholesterol   Blood sugar   Iron level   Vaccines - The recommended vaccines will depend on your age, health, and what vaccines you already had. Vaccines are very important because they can prevent certain serious or deadly infections.   Discussion of screening - \"Screening\" means checking for diseases or other health problems before they cause symptoms. Your doctor can recommend screening based on your age, risk, and preferences. This might include tests to check for:   Cancer, such as breast, prostate, cervical, ovarian, colorectal, prostate, lung, or skin cancer   Sexually transmitted infections, such as chlamydia and gonorrhea   Mental health conditions like depression and anxiety  Your doctor will talk to you about the different types of screening tests. They can help you decide which screenings to have. They can also explain what the results might mean.   Answering questions - The physical is a good time to ask the doctor or nurse questions about your health. If needed, they can refer you to other doctors or specialists, too.  Adults older than 65 years often need other care, too. As you get older, your doctor will talk to you about:   How to prevent falling at " home   Hearing or vision tests   Memory testing   How to take your medicines safely   Making sure that you have the help and support you need at home  All topics are updated as new evidence becomes available and our peer review process is complete.  This topic retrieved from [employer] on: May 02, 2024.  Topic 321639 Version 1.0  Release: 32.4.3 - C32.122  © 2024 UpToDate, Inc. and/or its affiliates. All rights reserved.  Consumer Information Use and Disclaimer   Disclaimer: This generalized information is a limited summary of diagnosis, treatment, and/or medication information. It is not meant to be comprehensive and should be used as a tool to help the user understand and/or assess potential diagnostic and treatment options. It does NOT include all information about conditions, treatments, medications, side effects, or risks that may apply to a specific patient. It is not intended to be medical advice or a substitute for the medical advice, diagnosis, or treatment of a health care provider based on the health care provider's examination and assessment of a patient's specific and unique circumstances. Patients must speak with a health care provider for complete information about their health, medical questions, and treatment options, including any risks or benefits regarding use of medications. This information does not endorse any treatments or medications as safe, effective, or approved for treating a specific patient. Up[employer]Date, Inc. and its affiliates disclaim any warranty or liability relating to this information or the use thereof.The use of this information is governed by the Terms of Use, available at https://www.[employer].com/en/know/clinical-effectiveness-terms. 2024© UpToDate, Inc. and its affiliates and/or licensors. All rights reserved.  Copyright   © 2024 Up[employer]Date, Inc. and/or its affiliates. All rights reserved.

## 2025-03-27 NOTE — PROGRESS NOTES
Adult Annual Physical  Name: Jocy Danielle      : 1980      MRN: [id number]  Encounter Provider: BRIAN Brooks  Encounter Date: 3/27/2025   Encounter department: [facility]    Assessment & Plan  Annual physical exam    Orders:  •  Lipid panel; Future  •  Hemoglobin A1C; Future  •  Comprehensive metabolic panel; Future    Encounter to establish care  [facility] previous provider         Chronic fatigue    Orders:  •  TSH + Free T4; Future    Vitamin D deficiency    Orders:  •  Vitamin D 25 hydroxy; Future    Crohn's disease involving terminal ileum (HCC)  Patient is followed by Dr Curtis and has frequent labs through his office    Patient instructed to return in 1 year for next follow-up/exam - sooner if needed           Preventive Screenings:  - Diabetes Screening: screening up-to-date and orders placed  - Cholesterol Screening: orders placed   - Hepatitis C screening: screening up-to-date   - HIV screening: screening up-to-date   - Cervical cancer screening: screening up-to-date   - Breast cancer screening: screening up-to-date   - Colon cancer screening: screening up-to-date   - Lung cancer screening: screening not indicated     Immunizations:  - Immunizations due: Influenza and Prevnar 20    Counseling/Anticipatory Guidance:  - Alcohol: discussed moderation in alcohol intake and recommendations for healthy alcohol use.   - Dental health: discussed importance of regular tooth brushing, flossing, and dental visits.   - Sexual health: discussed sexually transmitted diseases, partner selection, use of condoms, avoidance of unintended pregnancy, and contraceptive alternatives.   - Diet: discussed recommendations for a healthy/well-balanced diet.   - Exercise: the importance of regular exercise/physical activity was discussed. Recommend exercise 3-5 times per week for at least 30 minutes.   - Injury prevention: discussed safety/seat belts,  safety helmets, smoke detectors, carbon monoxide detectors, and smoking near bedding or upholstery.       Depression Screening and Follow-up Plan: Patient was screened for depression during today's encounter. They screened negative with a PHQ-2 score of 0.          History of Present Illness     Adult Annual Physical:  Patient presents for annual physical. Patient here to establish care and due for a comprehensive physical exam. She previously being followed at [facility] but wanted to  changing practices.  The patient reports that 3 days ago she experienced a possible atypical migraine. She developed visual disturbances than obtained a headache/sharp pain above eye on opposite side. Patient is concerned about her menstrual cycles and possible symptoms of perimenopause. She reports going for her gynecological exam and had a little cycle prior to the exam than  heavy cycle right afterwards. She is also looking for results of her Pap Smear that was done at gynecological exam but no order noted in system. She has Crohn's disease and is being followed by Dr Curtis. She has been experiencing more fatigue so plan to obtain thyroid levels.  .     Diet and Physical Activity:  - Diet/Nutrition: well balanced diet, consuming 3-5 servings of fruits/vegetables daily and adequate whole grain intake.  - Exercise: moderate cardiovascular exercise, 3-4 times a week on average and 30-60 minutes on average.    Depression Screening:  - PHQ-2 Score: 0    General Health:  - Sleep: sleeps well and 7-8 hours of sleep on average.  - Hearing: normal hearing bilateral ears.  - Vision: no vision problems.  - Dental: regular dental visits and brushes teeth twice daily.    /GYN Health:  - Follows with GYN: yes.   - Menopause: perimenopausal.   - Last menstrual cycle: 3/13/2025.   - History of STDs: no    Advanced Care Planning:  - Has an advanced directive?: no    - Has a durable medical POA?: no    - ACP  document given to patient?: no      Review of Systems   Constitutional:  Positive for fatigue. Negative for activity change, appetite change, fever and unexpected weight change.   HENT:  Negative for congestion, dental problem, ear pain, hearing loss, nosebleeds, sneezing, sore throat, tinnitus and trouble swallowing.    Eyes:  Negative for visual disturbance.   Respiratory:  Negative for cough, chest tightness, shortness of breath and wheezing.    Cardiovascular:  Negative for chest pain, palpitations and leg swelling.   Gastrointestinal:  Negative for abdominal distention, abdominal pain, constipation, diarrhea and nausea.   Endocrine: Positive for polydipsia. Negative for polyphagia and polyuria.   Genitourinary: Negative.    Musculoskeletal:  Negative for arthralgias, back pain, myalgias and neck pain.   Skin:  Negative for color change and rash.   Allergic/Immunologic: Negative for environmental allergies.   Neurological:  Negative for dizziness, weakness, light-headedness and headaches.   Hematological: Negative.    Psychiatric/Behavioral: Negative.  Negative for dysphoric mood and sleep disturbance. The patient is not nervous/anxious.      Current Outpatient Medications on File Prior to Visit   Medication Sig Dispense Refill   • Ascorbic Acid (vitamin C) 1000 MG tablet Take 1,000 mg by mouth daily     • cholecalciferol (VITAMIN D3) 1,000 units tablet Take 1,000 Units by mouth daily     • dicyclomine (BENTYL) 20 mg tablet Take 1 tablet (20 mg total) by mouth 2 (two) times a day as needed (abdominal pain) 30 tablet 2   • hydrocortisone (ANUSOL-HC) 2.5 % rectal cream Apply topically 2 (two) times a day Apply topically 2 times daily for 7-10 days 28 g 0   • hydrocortisone (ANUSOL-HC) 25 mg suppository Insert 1 suppository (25 mg total) into the rectum 2 (two) times a day 30 suppository 3   • Multiple Vitamins-Minerals (MULTIVITAMIN ADULT PO) Daily     • pantoprazole (PROTONIX) 40 mg tablet Take 1 tablet (40 mg  "total) by mouth daily 30 tablet 3   • PREBIOTIC PRODUCT PO Take by mouth     • Probiotic Product (PROBIOTIC-10 PO) Probiotic   1 tab daily     • [DISCONTINUED] azaTHIOprine (IMURAN) 50 mg tablet Take 1 tablet (50 mg total) by mouth daily 90 tablet 0   • [DISCONTINUED] ciprofloxacin-dexamethasone (CIPRODEX) otic suspension Administer 4 drops into both ears 2 (two) times a day 7.5 mL 2   • [DISCONTINUED] ciprofloxacin-dexamethasone (CIPRODEX) otic suspension Administer 4 drops into both ears 2 (two) times a day (Patient not taking: Reported on 6/24/2024) 7.5 mL 0   • [DISCONTINUED] escitalopram (LEXAPRO) 10 mg tablet Take 1 tablet (10 mg total) by mouth daily (Patient not taking: Reported on 3/12/2025) 90 tablet 2   • [DISCONTINUED] ondansetron (ZOFRAN-ODT) 4 mg disintegrating tablet  (Patient not taking: Reported on 3/27/2025)     • [DISCONTINUED] sodium chloride (OCEAN) 0.65 % nasal spray 1 spray into each nostril as needed for congestion (Patient not taking: Reported on 6/24/2024) 30 mL 0   • [DISCONTINUED] Tranexamic Acid 650 MG TABS Take 2 tablets (1,300 mg total) by mouth 3 (three) times a day (Patient not taking: Reported on 6/24/2024) 5 tablet 4     No current facility-administered medications on file prior to visit.        Objective   /82 (BP Location: Left arm, Patient Position: Sitting, Cuff Size: Standard)   Pulse 76   Temp 98.3 °F (36.8 °C) (Temporal)   Ht 4' 10.75\" (1.492 m)   Wt 55.3 kg (122 lb)   LMP 03/13/2025 (Exact Date)   SpO2 98%   BMI 24.85 kg/m² (Reviewed)    Physical Exam  Vitals reviewed.   Constitutional:       General: She is not in acute distress.     Appearance: Normal appearance. She is well-developed and well-groomed. She is not ill-appearing.   HENT:      Head: Normocephalic and atraumatic.      Right Ear: External ear normal.      Left Ear: External ear normal.      Nose: Nose normal.      Mouth/Throat:      Lips: Pink.      Mouth: Mucous membranes are moist.      Pharynx: " Oropharynx is clear.   Eyes:      General: Lids are normal.      Extraocular Movements: Extraocular movements intact.      Conjunctiva/sclera: Conjunctivae normal.      Pupils: Pupils are equal, round, and reactive to light.   Neck:      Thyroid: No thyromegaly or thyroid tenderness.      Trachea: Trachea and phonation normal.   Cardiovascular:      Rate and Rhythm: Normal rate and regular rhythm.      Pulses:           Radial pulses are 2+ on the right side and 2+ on the left side.        Dorsalis pedis pulses are 2+ on the right side and 2+ on the left side.        Posterior tibial pulses are 2+ on the right side and 2+ on the left side.      Heart sounds: Normal heart sounds. No murmur heard.  Pulmonary:      Effort: Pulmonary effort is normal.      Breath sounds: Normal breath sounds.   Abdominal:      General: Abdomen is flat. Bowel sounds are normal. There is no distension.      Palpations: Abdomen is soft.      Tenderness: There is no abdominal tenderness.   Musculoskeletal:      Cervical back: Neck supple.      Right lower leg: No edema.      Left lower leg: No edema.   Skin:     General: Skin is warm and dry.      Capillary Refill: Capillary refill takes less than 2 seconds.   Neurological:      General: No focal deficit present.      Mental Status: She is alert and oriented to person, place, and time.      Cranial Nerves: Cranial nerves 2-12 are intact.   Psychiatric:         Mood and Affect: Mood normal.         Behavior: Behavior normal. Behavior is cooperative.

## 2025-04-01 ENCOUNTER — APPOINTMENT (OUTPATIENT)
Dept: LAB | Facility: HOSPITAL | Age: 45
End: 2025-04-01
Payer: COMMERCIAL

## 2025-04-01 DIAGNOSIS — K50.00 CROHN'S DISEASE INVOLVING TERMINAL ILEUM (HCC): ICD-10-CM

## 2025-04-01 DIAGNOSIS — R53.82 CHRONIC FATIGUE: ICD-10-CM

## 2025-04-01 DIAGNOSIS — Z00.00 ANNUAL PHYSICAL EXAM: ICD-10-CM

## 2025-04-01 DIAGNOSIS — E55.9 VITAMIN D DEFICIENCY: ICD-10-CM

## 2025-04-01 LAB
25(OH)D3 SERPL-MCNC: 25.1 NG/ML (ref 30–100)
ALBUMIN SERPL BCG-MCNC: 4.5 G/DL (ref 3.5–5)
ALP SERPL-CCNC: 73 U/L (ref 34–104)
ALT SERPL W P-5'-P-CCNC: 20 U/L (ref 7–52)
ANION GAP SERPL CALCULATED.3IONS-SCNC: 5 MMOL/L (ref 4–13)
AST SERPL W P-5'-P-CCNC: 21 U/L (ref 13–39)
BASOPHILS # BLD AUTO: 0.02 THOUSANDS/ÂΜL (ref 0–0.1)
BASOPHILS NFR BLD AUTO: 0 % (ref 0–1)
BILIRUB DIRECT SERPL-MCNC: 0.1 MG/DL (ref 0–0.2)
BILIRUB SERPL-MCNC: 0.58 MG/DL (ref 0.2–1)
BUN SERPL-MCNC: 14 MG/DL (ref 5–25)
CALCIUM SERPL-MCNC: 9.4 MG/DL (ref 8.4–10.2)
CHLORIDE SERPL-SCNC: 104 MMOL/L (ref 96–108)
CO2 SERPL-SCNC: 31 MMOL/L (ref 21–32)
CREAT SERPL-MCNC: 0.6 MG/DL (ref 0.6–1.3)
EOSINOPHIL # BLD AUTO: 0.21 THOUSAND/ÂΜL (ref 0–0.61)
EOSINOPHIL NFR BLD AUTO: 4 % (ref 0–6)
ERYTHROCYTE [DISTWIDTH] IN BLOOD BY AUTOMATED COUNT: 11.9 % (ref 11.6–15.1)
EST. AVERAGE GLUCOSE BLD GHB EST-MCNC: 117 MG/DL
GFR SERPL CREATININE-BSD FRML MDRD: 111 ML/MIN/1.73SQ M
GLUCOSE SERPL-MCNC: 121 MG/DL (ref 65–140)
HBA1C MFR BLD: 5.7 %
HCT VFR BLD AUTO: 34.6 % (ref 34.8–46.1)
HGB BLD-MCNC: 11.4 G/DL (ref 11.5–15.4)
IMM GRANULOCYTES # BLD AUTO: 0.01 THOUSAND/UL (ref 0–0.2)
IMM GRANULOCYTES NFR BLD AUTO: 0 % (ref 0–2)
LIPASE SERPL-CCNC: 67 U/L (ref 11–82)
LYMPHOCYTES # BLD AUTO: 1.53 THOUSANDS/ÂΜL (ref 0.6–4.47)
LYMPHOCYTES NFR BLD AUTO: 27 % (ref 14–44)
MCH RBC QN AUTO: 30.2 PG (ref 26.8–34.3)
MCHC RBC AUTO-ENTMCNC: 32.9 G/DL (ref 31.4–37.4)
MCV RBC AUTO: 92 FL (ref 82–98)
MONOCYTES # BLD AUTO: 0.33 THOUSAND/ÂΜL (ref 0.17–1.22)
MONOCYTES NFR BLD AUTO: 6 % (ref 4–12)
NEUTROPHILS # BLD AUTO: 3.57 THOUSANDS/ÂΜL (ref 1.85–7.62)
NEUTS SEG NFR BLD AUTO: 63 % (ref 43–75)
NRBC BLD AUTO-RTO: 0 /100 WBCS
PLATELET # BLD AUTO: 246 THOUSANDS/UL (ref 149–390)
PMV BLD AUTO: 10.5 FL (ref 8.9–12.7)
POTASSIUM SERPL-SCNC: 3.6 MMOL/L (ref 3.5–5.3)
PROT SERPL-MCNC: 7.6 G/DL (ref 6.4–8.4)
RBC # BLD AUTO: 3.78 MILLION/UL (ref 3.81–5.12)
SODIUM SERPL-SCNC: 140 MMOL/L (ref 135–147)
T4 FREE SERPL-MCNC: 0.71 NG/DL (ref 0.61–1.12)
TSH SERPL DL<=0.05 MIU/L-ACNC: 4.3 UIU/ML (ref 0.45–4.5)
WBC # BLD AUTO: 5.67 THOUSAND/UL (ref 4.31–10.16)

## 2025-04-01 PROCEDURE — 83036 HEMOGLOBIN GLYCOSYLATED A1C: CPT

## 2025-04-01 PROCEDURE — 82248 BILIRUBIN DIRECT: CPT

## 2025-04-01 PROCEDURE — 36415 COLL VENOUS BLD VENIPUNCTURE: CPT

## 2025-04-01 PROCEDURE — 80053 COMPREHEN METABOLIC PANEL: CPT

## 2025-04-01 PROCEDURE — 84443 ASSAY THYROID STIM HORMONE: CPT

## 2025-04-01 PROCEDURE — 85025 COMPLETE CBC W/AUTO DIFF WBC: CPT

## 2025-04-01 PROCEDURE — 82306 VITAMIN D 25 HYDROXY: CPT

## 2025-04-01 PROCEDURE — 84439 ASSAY OF FREE THYROXINE: CPT

## 2025-04-01 PROCEDURE — 83690 ASSAY OF LIPASE: CPT

## 2025-04-02 ENCOUNTER — RESULTS FOLLOW-UP (OUTPATIENT)
Dept: FAMILY MEDICINE CLINIC | Facility: CLINIC | Age: 45
End: 2025-04-02

## 2025-05-08 ENCOUNTER — APPOINTMENT (OUTPATIENT)
Dept: LAB | Facility: HOSPITAL | Age: 45
End: 2025-05-08
Attending: PREVENTIVE MEDICINE
Payer: COMMERCIAL

## 2025-05-08 DIAGNOSIS — Z00.8 HEALTH EXAMINATION IN POPULATION SURVEY: ICD-10-CM

## 2025-05-08 LAB
CHOLEST SERPL-MCNC: 192 MG/DL (ref ?–200)
EST. AVERAGE GLUCOSE BLD GHB EST-MCNC: 117 MG/DL
HBA1C MFR BLD: 5.7 %
HDLC SERPL-MCNC: 74 MG/DL
LDLC SERPL CALC-MCNC: 102 MG/DL (ref 0–100)
NONHDLC SERPL-MCNC: 118 MG/DL
TRIGL SERPL-MCNC: 78 MG/DL (ref ?–150)

## 2025-05-08 PROCEDURE — 80061 LIPID PANEL: CPT

## 2025-05-08 PROCEDURE — 83036 HEMOGLOBIN GLYCOSYLATED A1C: CPT

## 2025-06-11 DIAGNOSIS — K50.00 CROHN'S DISEASE INVOLVING TERMINAL ILEUM (HCC): Primary | ICD-10-CM

## 2025-06-11 RX ORDER — AZATHIOPRINE 50 MG/1
50 TABLET ORAL DAILY
Qty: 90 TABLET | Refills: 0 | Status: SHIPPED | OUTPATIENT
Start: 2025-06-11

## 2025-07-25 ENCOUNTER — OCCMED (OUTPATIENT)
Dept: URGENT CARE | Facility: MEDICAL CENTER | Age: 45
End: 2025-07-25
Payer: OTHER MISCELLANEOUS

## 2025-07-25 DIAGNOSIS — Z77.21 EXPOSURE TO BLOOD-BORNE PATHOGEN: ICD-10-CM

## 2025-07-25 DIAGNOSIS — S61.233A PUNCTURE WOUND OF LEFT MIDDLE FINGER: Primary | ICD-10-CM

## 2025-07-25 PROCEDURE — G0383 LEV 4 HOSP TYPE B ED VISIT: HCPCS

## 2025-07-25 PROCEDURE — 99284 EMERGENCY DEPT VISIT MOD MDM: CPT

## 2025-07-26 ENCOUNTER — APPOINTMENT (OUTPATIENT)
Dept: LAB | Facility: MEDICAL CENTER | Age: 45
End: 2025-07-26
Payer: OTHER MISCELLANEOUS

## 2025-07-26 DIAGNOSIS — S61.233A PUNCTURE WOUND OF LEFT MIDDLE FINGER: ICD-10-CM

## 2025-07-26 DIAGNOSIS — Z77.21 EXPOSURE TO BLOOD-BORNE PATHOGEN: ICD-10-CM

## 2025-07-26 LAB — ALT SERPL W P-5'-P-CCNC: 22 U/L (ref 7–52)

## 2025-07-26 PROCEDURE — 36415 COLL VENOUS BLD VENIPUNCTURE: CPT

## 2025-07-26 PROCEDURE — 86706 HEP B SURFACE ANTIBODY: CPT

## 2025-07-26 PROCEDURE — 86803 HEPATITIS C AB TEST: CPT

## 2025-07-26 PROCEDURE — 84460 ALANINE AMINO (ALT) (SGPT): CPT

## 2025-07-26 PROCEDURE — 87389 HIV-1 AG W/HIV-1&-2 AB AG IA: CPT

## 2025-07-26 PROCEDURE — 87340 HEPATITIS B SURFACE AG IA: CPT

## 2025-07-26 PROCEDURE — 86704 HEP B CORE ANTIBODY TOTAL: CPT

## 2025-07-27 LAB
HBV CORE AB SER QL: NORMAL
HBV SURFACE AB SER-ACNC: 5.26 MIU/ML
HBV SURFACE AG SER QL: NORMAL
HCV AB SER QL: NORMAL
HIV 1+2 AB+HIV1 P24 AG SERPL QL IA: NORMAL

## 2025-07-28 DIAGNOSIS — Z77.21 EXPOSURE TO BLOODBORNE PATHOGEN: Primary | ICD-10-CM

## (undated) DEVICE — ELECTRODE NEEDLE MEGAFINE 2IN E-Z CLEAN MEGADYNE -0118

## (undated) DEVICE — TUBING SUCTION 5MM X 12 FT

## (undated) DEVICE — PAD GROUNDING ADULT

## (undated) DEVICE — GLOVE SRG BIOGEL 6.5

## (undated) DEVICE — TIBURON SPLIT SHEET: Brand: CONVERTORS

## (undated) DEVICE — GLOVE INDICATOR PI UNDERGLOVE SZ 6.5 BLUE

## (undated) DEVICE — PENCIL ELECTROSURG E-Z CLEAN -0035H

## (undated) DEVICE — INTENDED FOR TISSUE SEPARATION, AND OTHER PROCEDURES THAT REQUIRE A SHARP SURGICAL BLADE TO PUNCTURE OR CUT.: Brand: BARD-PARKER ® CARBON RIB-BACK BLADES

## (undated) DEVICE — BETHLEHEM UNIVERSAL OUTPATIENT: Brand: CARDINAL HEALTH

## (undated) DEVICE — VIAL DECANTER

## (undated) DEVICE — 3M™ STERI-STRIP™ COMPOUND BENZOIN TINCTURE 40 BAGS/CARTON 4 CARTONS/CASE C1544: Brand: 3M™ STERI-STRIP™

## (undated) DEVICE — SPONGE STICK WITH PVP-I: Brand: KENDALL

## (undated) DEVICE — GLOVE SRG BIOGEL 7

## (undated) DEVICE — 3M™ STERI-STRIP™ REINFORCED ADHESIVE SKIN CLOSURES, R1547, 1/2 IN X 4 IN (12 MM X 100 MM), 6 STRIPS/ENVELOPE: Brand: 3M™ STERI-STRIP™

## (undated) DEVICE — SUT SILK 5-0 P-3 18 IN 640G

## (undated) DEVICE — POV-IOD SWAB STICKS

## (undated) DEVICE — NEEDLE 27 G X 1 1/4